# Patient Record
Sex: FEMALE | Race: BLACK OR AFRICAN AMERICAN | Employment: FULL TIME | URBAN - METROPOLITAN AREA
[De-identification: names, ages, dates, MRNs, and addresses within clinical notes are randomized per-mention and may not be internally consistent; named-entity substitution may affect disease eponyms.]

---

## 2017-01-17 ENCOUNTER — TELEPHONE (OUTPATIENT)
Dept: INTERNAL MEDICINE CLINIC | Age: 57
End: 2017-01-17

## 2017-01-17 NOTE — TELEPHONE ENCOUNTER
Pt requesting a call back with an appt for tomorrow for a poss stomach virus and BP being up/down.  Pt can be reached at [phone].        Message received & copied from [facility]

## 2017-01-18 NOTE — TELEPHONE ENCOUNTER
The patient missed a call from the office and is requesting a call back.  (l)[phone]       Message received & copied from [facility] after closing on 1/17/17

## 2017-01-18 NOTE — TELEPHONE ENCOUNTER
I called and spoke with patient. Pt states that she has had a stomach virus since Sunday. She states that she has had vomiting and diarrhea. She states that the vomiting has calmed down but is still having diarrhea. She was advised to keep herself hydrated with water and Gatorade mixed with water. She was also advised to take Imodium AD for diarrhea when she has diarrhea. She was advised to use BRAT diet. I went over not to eat something too heavy and can have clear soup as well. She verbalized understanding. She has an appointment with Dr Xavier Barnhart 1/20/17 for this issue and bp issues.

## 2017-01-20 ENCOUNTER — OFFICE VISIT (OUTPATIENT)
Dept: INTERNAL MEDICINE CLINIC | Age: 57
End: 2017-01-20

## 2017-01-20 VITALS
OXYGEN SATURATION: 99 % | SYSTOLIC BLOOD PRESSURE: 130 MMHG | WEIGHT: 170 LBS | HEART RATE: 72 BPM | TEMPERATURE: 97.9 F | BODY MASS INDEX: 32.1 KG/M2 | HEIGHT: 61 IN | DIASTOLIC BLOOD PRESSURE: 80 MMHG

## 2017-01-20 DIAGNOSIS — G44.221 CHRONIC TENSION-TYPE HEADACHE, INTRACTABLE: ICD-10-CM

## 2017-01-20 DIAGNOSIS — I10 ESSENTIAL HYPERTENSION: Primary | ICD-10-CM

## 2017-01-20 RX ORDER — ONDANSETRON 4 MG/1
4 TABLET, ORALLY DISINTEGRATING ORAL
Qty: 10 TAB | Refills: 0 | Status: SHIPPED | OUTPATIENT
Start: 2017-01-20 | End: 2017-12-28 | Stop reason: SDUPTHER

## 2017-01-20 RX ORDER — BUTALBITAL, ACETAMINOPHEN AND CAFFEINE 50; 325; 40 MG/1; MG/1; MG/1
TABLET ORAL
Qty: 15 TAB | Refills: 0 | Status: SHIPPED | OUTPATIENT
Start: 2017-01-20 | End: 2017-01-24 | Stop reason: SDUPTHER

## 2017-01-20 NOTE — MR AVS SNAPSHOT
Visit Information Date & Time Provider Department Dept. Phone Encounter #  
 1/20/2017 10:30 AM Omar Hussein, [address] [phone] [id number] Follow-up Instructions Return in about 2 months (around 3/20/2017) for dm-2 htn . Upcoming Health Maintenance Date Due Hepatitis C Screening 1960 EYE EXAM RETINAL OR DILATED Q1 5/30/1972 Pneumococcal 19-64 Medium Risk (1 of 1 - PPSV23) 5/30/1981 DTaP/Tdap/Td series (1 - Tdap) 5/30/1983 PAP AKA CERVICAL CYTOLOGY 5/30/1983 FOBT Q 1 YEAR AGE 50-75 5/30/2012 FOOT EXAM Q1 7/21/2016 INFLUENZA AGE 9 TO ADULT 8/1/2016 HEMOGLOBIN A1C Q6M 3/20/2017 BREAST CANCER SCRN MAMMOGRAM 8/19/2017 MICROALBUMIN Q1 9/20/2017 LIPID PANEL Q1 9/20/2017 Allergies as of 1/20/2017  Review Complete On: 1/20/2017 By: Brandon Khalil LPN No Known Allergies Current Immunizations  Never Reviewed No immunizations on file. Not reviewed this visit You Were Diagnosed With   
  
 Codes Comments Essential hypertension    -  Primary ICD-10-CM: I10 
ICD-9-CM: 401.9 Chronic tension-type headache, intractable     ICD-10-CM: I17.044 ICD-9-CM: 339.12 Vitals BP Pulse Temp Height(growth percentile) Weight(growth percentile) LMP  
 130/80 72 97.9 °F (36.6 °C) (Oral) 5' 1\" (1.549 m) 170 lb (77.1 kg) 11/29/2010 SpO2 BMI OB Status Smoking Status 99% 32.12 kg/m2 Postmenopausal Never Smoker Vitals History BMI and BSA Data Body Mass Index Body Surface Area  
 32.12 kg/m 2 1.82 m 2 Preferred Pharmacy Pharmacy Name Phone [id number] [place], [address] Jefe Yost [phone] Your Updated Medication List  
  
   
This list is accurate as of: 1/20/17 11:41 AM.  Always use your most recent med list.  
  
  
  
  
 aspirin delayed-release 81 mg tablet Take  by mouth daily. butalbital-acetaminophen-caffeine -40 mg per tablet Commonly known as:  FIORICET, ESGIC  
TAKE 1 TABLET EVERY 4 HOURS AS NEEDED  
  
 hydroCHLOROthiazide 12.5 mg capsule Commonly known as:  Lon Young Take 1 Cap by mouth daily. hydrocortisone 2.5 % topical cream  
Commonly known as:  HYTONE Apply  to affected area two (2) times a day. use thin layer  
  
 ibuprofen 600 mg tablet Commonly known as:  MOTRIN  
TAKE 1 TAB BY MOUTH EVERY EIGHT (8) HOURS AS NEEDED FOR PAIN. losartan 25 mg tablet Commonly known as:  COZAAR  
TAKE 1 TABLET BY MOUTH ONCE DAILY  
  
 metFORMIN 1,000 mg tablet Commonly known as:  GLUCOPHAGE  
TAKE 1 TABLET BY MOUTH ONCE DAILY  
  
 ondansetron 4 mg disintegrating tablet Commonly known as:  ZOFRAN ODT Take 1 Tab by mouth every eight (8) hours as needed for Nausea. simvastatin 40 mg tablet Commonly known as:  ZOCOR  
TAKE 1 TABLET BY MOUTH EVERY EVENING AT BEDTIME  
  
 timolol 0.5 % ophthalmic solution Commonly known as:  TIMOPTIC Prescriptions Printed Refills  
 butalbital-acetaminophen-caffeine (FIORICET, ESGIC) -40 mg per tablet 0 Sig: TAKE 1 TABLET EVERY 4 HOURS AS NEEDED Class: Print Prescriptions Sent to Pharmacy Refills  
 ondansetron (ZOFRAN ODT) 4 mg disintegrating tablet 0 Sig: Take 1 Tab by mouth every eight (8) hours as needed for Nausea. Class: Normal  
 Pharmacy: [facility]/pharmacy [address] [place] [address] #: [phone] Route: Oral  
  
Follow-up Instructions Return in about 2 months (around 3/20/2017) for dm-2 htn . Introducing [facility] & HEALTH SERVICES! Juana Haney introduces [employer] patient portal. Now you can access parts of your medical record, email your doctor's office, and request medication refills online. 1. In your internet browser, go to [email or web address] 2. Click on the First Time User? Click Here link in the Sign In box. You will see the New Member Sign Up page. 3. Enter your [employer] Access Code exactly as it appears below. You will not need to use this code after youve completed the sign-up process. If you do not sign up before the expiration date, you must request a new code. · [employer] Access Code: [email or web address] Expires: 4/20/2017 11:41 AM 
 
4. Enter the last four digits of your Social Security Number (xxxx) and Date of Birth (mm/dd/yyyy) as indicated and click Submit. You will be taken to the next sign-up page. 5. Create a [employer] ID. This will be your [employer] login ID and cannot be changed, so think of one that is secure and easy to remember. 6. Create a [employer] password. You can change your password at any time. 7. Enter your Password Reset Question and Answer. This can be used at a later time if you forget your password. 8. Enter your e-mail address. You will receive e-mail notification when new information is available in [address] 9. Click Sign Up. You can now view and download portions of your medical record. 10. Click the Download Summary menu link to download a portable copy of your medical information. If you have questions, please visit the Frequently Asked Questions section of the [employer] website. Remember, [employer] is NOT to be used for urgent needs. For medical emergencies, dial 911. Now available from your iPhone and Android! Please provide this summary of care documentation to your next provider. Your primary care clinician is listed as Main MARIE. If you have any questions after today's visit, please call [phone].

## 2017-01-20 NOTE — PROGRESS NOTES
HISTORY OF PRESENT ILLNESS  Hema Guthrie is a 49 y.o. female. HPI   F/u HTN  Got sick last week with GI virus, missed a few days of work this week Sunday, Monday, Wednesday. Feels better now  Nausea resolved, no longer vomiting. Still with some diarrhea. bp was 184/107 at work last week, had stopped hctz but restarted last week after BP was elevated. Has some headaches. Vision ok s/p laser sx for glaucoma--Dr Phillips. Patient Active Problem List    Diagnosis Date Noted    Peripheral neuropathy ([facility] 01/30/2012    Bronchitis 06/19/2009    Diabetes mellitus ([facility] 06/19/2009    Diabetic gastroparesis ([facility] 06/19/2009    HTN (hypertension) 06/19/2009    Hypercholesterolemia 06/19/2009    Osteoarthritis 06/19/2009     Current Outpatient Prescriptions   Medication Sig Dispense Refill    ondansetron (ZOFRAN ODT) 4 mg disintegrating tablet Take 1 Tab by mouth every eight (8) hours as needed for Nausea. 10 Tab 0    butalbital-acetaminophen-caffeine (FIORICET, ESGIC) -40 mg per tablet TAKE 1 TABLET EVERY 4 HOURS AS NEEDED 15 Tab 0    losartan (COZAAR) 25 mg tablet TAKE 1 TABLET BY MOUTH ONCE DAILY 90 Tab 2    aspirin delayed-release 81 mg tablet Take  by mouth daily.  simvastatin (ZOCOR) 40 mg tablet TAKE 1 TABLET BY MOUTH EVERY EVENING AT BEDTIME 90 Tab 2    metFORMIN (GLUCOPHAGE) 1,000 mg tablet TAKE 1 TABLET BY MOUTH ONCE DAILY 90 Tab 3    hydrocortisone (HYTONE) 2.5 % topical cream Apply  to affected area two (2) times a day. use thin layer 30 g 0    hydrochlorothiazide (MICROZIDE) 12.5 mg capsule Take 1 Cap by mouth daily. 30 Cap 6    timolol (TIMOPTIC) 0.5 % ophthalmic solution   4    ibuprofen (MOTRIN) 600 mg tablet TAKE 1 TAB BY MOUTH EVERY EIGHT (8) HOURS AS NEEDED FOR PAIN.  30 Tab 1     No Known Allergies   Lab Results  Component Value Date/Time   Hemoglobin A1c 6.4 09/20/2016 11:36 AM   Hemoglobin A1c 6.3 09/30/2015 11:43 AM   Hemoglobin A1c 6.5 05/04/2015 12:00 AM   Glucose 98 09/20/2016 11:36 AM   Glucose  12/22/2015 10:15 AM   Microalb/Creat ratio (ug/mg creat.) 8.2 09/20/2016 11:36 AM   LDL, calculated 120 09/20/2016 11:36 AM   Creatinine 0.81 09/20/2016 11:36 AM      Lab Results  Component Value Date/Time   Cholesterol, total 201 09/20/2016 11:36 AM   HDL Cholesterol 62 09/20/2016 11:36 AM   LDL, calculated 120 09/20/2016 11:36 AM   Triglyceride 93 09/20/2016 11:36 AM   CHOL/HDL Ratio 3.2 07/07/2010 12:14 PM       Lab Results  Component Value Date/Time   GFR est AA 95 09/20/2016 11:36 AM   GFR est non-AA 83 09/20/2016 11:36 AM   Creatinine 0.81 09/20/2016 11:36 AM   BUN 25 09/20/2016 11:36 AM   Sodium 142 09/20/2016 11:36 AM   Potassium 4.8 09/20/2016 11:36 AM   Chloride 99 09/20/2016 11:36 AM   CO2 22 09/20/2016 11:36 AM         ROS    Physical Exam   Constitutional: She appears well-developed and well-nourished. Appears stated age   Cardiovascular: Normal rate, regular rhythm and normal heart sounds. Exam reveals no gallop and no friction rub. No murmur heard. Pulmonary/Chest: Effort normal and breath sounds normal. No respiratory distress. She has no wheezes. Abdominal: Soft. Bowel sounds are normal.   Musculoskeletal: She exhibits no edema. Neurological: She is alert. Psychiatric: She has a normal mood and affect. Nursing note and vitals reviewed. ASSESSMENT and PLAN  Alo Prince was seen today for hypertension, cough and headache. Diagnoses and all orders for this visit:    Essential hypertension   Controlled, stressed med adherence    Chronic tension-type headache, intractable   Refill fioricet 15 tabs      Gastroenteritis   sxs resolving   immodium ad prn   rx for zofran   Work note  Other orders  -     ondansetron (ZOFRAN ODT) 4 mg disintegrating tablet; Take 1 Tab by mouth every eight (8) hours as needed for Nausea.   -     butalbital-acetaminophen-caffeine (FIORICET, ESGIC) -40 mg per tablet; TAKE 1 TABLET EVERY 4 HOURS AS NEEDED      Follow-up Disposition:  Return in about 2 months (around 3/20/2017) for dm-2 htn .

## 2017-01-20 NOTE — LETTER
NOTIFICATION RETURN TO WORK / SCHOOL 
 
1/20/2017 11:44 AM 
 
Ms. Tariq Vasquez [address] [place] [address] [phone] To Whom It May Concern: 
 
Tariq Vasquez is currently under the care of [facility]. She will return to work/school on: 1/23/17 Please excuse work absences 1/15/17, 1/16/17, 1/18/17 and 1/20/17 due to medical illness If there are questions or concerns please have the patient contact our office.  
 
 
 
Sincerely, 
 
 
Kelly Naqvi MD

## 2017-01-24 RX ORDER — BUTALBITAL, ACETAMINOPHEN AND CAFFEINE 50; 325; 40 MG/1; MG/1; MG/1
TABLET ORAL
Qty: 10 TAB | Refills: 0 | Status: SHIPPED | OUTPATIENT
Start: 2017-01-24 | End: 2017-03-16 | Stop reason: SDUPTHER

## 2017-03-16 ENCOUNTER — OFFICE VISIT (OUTPATIENT)
Dept: INTERNAL MEDICINE CLINIC | Age: 57
End: 2017-03-16

## 2017-03-16 VITALS
OXYGEN SATURATION: 100 % | SYSTOLIC BLOOD PRESSURE: 162 MMHG | BODY MASS INDEX: 32.47 KG/M2 | DIASTOLIC BLOOD PRESSURE: 64 MMHG | TEMPERATURE: 97.8 F | HEART RATE: 66 BPM | WEIGHT: 172 LBS | HEIGHT: 61 IN

## 2017-03-16 DIAGNOSIS — G44.219 EPISODIC TENSION-TYPE HEADACHE, NOT INTRACTABLE: ICD-10-CM

## 2017-03-16 DIAGNOSIS — E11.620 TYPE 2 DIABETES MELLITUS WITH DIABETIC DERMATITIS, WITHOUT LONG-TERM CURRENT USE OF INSULIN (HCC): ICD-10-CM

## 2017-03-16 DIAGNOSIS — I10 ESSENTIAL HYPERTENSION: Primary | ICD-10-CM

## 2017-03-16 DIAGNOSIS — H57.12 EYE PAIN, LEFT: ICD-10-CM

## 2017-03-16 RX ORDER — HYDROCHLOROTHIAZIDE 25 MG/1
25 TABLET ORAL DAILY
Qty: 90 TAB | Refills: 3 | Status: SHIPPED | OUTPATIENT
Start: 2017-03-16 | End: 2017-12-28 | Stop reason: SDUPTHER

## 2017-03-16 RX ORDER — LOSARTAN POTASSIUM 50 MG/1
TABLET ORAL
Qty: 90 TAB | Refills: 3 | Status: SHIPPED | OUTPATIENT
Start: 2017-03-16 | End: 2017-12-28 | Stop reason: SDUPTHER

## 2017-03-16 RX ORDER — BUTALBITAL, ACETAMINOPHEN AND CAFFEINE 50; 325; 40 MG/1; MG/1; MG/1
TABLET ORAL
Qty: 20 TAB | Refills: 0 | Status: SHIPPED | OUTPATIENT
Start: 2017-03-16 | End: 2017-05-02 | Stop reason: SDUPTHER

## 2017-03-16 NOTE — MR AVS SNAPSHOT
Visit Information Date & Time Provider Department Dept. Phone Encounter #  
 3/16/2017  9:45 AM Mina Boo, [address] [place] [address],[address] [phone] [id number] Follow-up Instructions Return in about 1 month (around 4/16/2017) for HTN. Your Appointments 3/20/2017  3:00 PM  
ESTABLISHED PATIENT with Huber Matias MD  
[facility] Dr. Juan J Garcia [facility] (Jelani Guzmán) Appt Note: re evaluate cyst  
 [address], [address] [facility], [address] [phone]  
[address], [address], [address] [facility] [address] [address]  
  
    
 9/21/2017 10:30 AM  
PHYSICAL with Mina Boo MD  
[facility] Jelani Guzmán) Appt Note: CPE//Yearly Physical w/fasting labs//last cpe 9/20/16//Bam-3/14/17  
 [facility] [address] [id number]  
[address] [facility] [address] [place] [address] Upcoming Health Maintenance Date Due Hepatitis C Screening 1960 EYE EXAM RETINAL OR DILATED Q1 5/30/1972 Pneumococcal 19-64 Medium Risk (1 of 1 - PPSV23) 5/30/1981 DTaP/Tdap/Td series (1 - Tdap) 5/30/1983 PAP AKA CERVICAL CYTOLOGY 5/30/1983 FOBT Q 1 YEAR AGE 50-75 5/30/2012 FOOT EXAM Q1 7/21/2016 INFLUENZA AGE 9 TO ADULT 8/1/2016 HEMOGLOBIN A1C Q6M 3/20/2017 BREAST CANCER SCRN MAMMOGRAM 8/19/2017 MICROALBUMIN Q1 9/20/2017 LIPID PANEL Q1 9/20/2017 Allergies as of 3/16/2017  Review Complete On: 3/16/2017 By: Erika Currie LPN No Known Allergies Current Immunizations  Never Reviewed No immunizations on file. Not reviewed this visit You Were Diagnosed With   
  
 Codes Comments Essential hypertension    -  Primary ICD-10-CM: I10 
ICD-9-CM: 401.9 Episodic tension-type headache, not intractable     ICD-10-CM: Q39.382 ICD-9-CM: 339.11  Type 2 diabetes mellitus with diabetic dermatitis, without long-term current use of insulin ([facility]     ICD-10-CM: E11.620 ICD-9-CM: 250.80 Eye pain, left     ICD-10-CM: H57.12 
ICD-9-CM: 379.91 Vitals BP Pulse Temp Height(growth percentile) Weight(growth percentile) LMP  
 162/64 66 97.8 °F (36.6 °C) (Oral) 5' 1\" (1.549 m) 172 lb (78 kg) 11/29/2010 SpO2 BMI OB Status Smoking Status 100% 32.5 kg/m2 Postmenopausal Never Smoker Vitals History BMI and BSA Data Body Mass Index Body Surface Area 32.5 kg/m 2 1.83 m 2 Preferred Pharmacy Pharmacy Name Phone [phone], [address] Anna Kapadia [phone] Your Updated Medication List  
  
   
This list is accurate as of: 3/16/17 10:38 AM.  Always use your most recent med list.  
  
  
  
  
 aspirin delayed-release 81 mg tablet Take  by mouth daily. butalbital-acetaminophen-caffeine -40 mg per tablet Commonly known as:  FIORICET, ESGIC  
TAKE 1 TABLET EVERY 4 HOURS AS NEEDED  
  
 hydroCHLOROthiazide 25 mg tablet Commonly known as:  HYDRODIURIL Take 1 Tab by mouth daily. hydrocortisone 2.5 % topical cream  
Commonly known as:  HYTONE Apply  to affected area two (2) times a day. use thin layer  
  
 ibuprofen 600 mg tablet Commonly known as:  MOTRIN  
TAKE 1 TAB BY MOUTH EVERY EIGHT (8) HOURS AS NEEDED FOR PAIN. losartan 50 mg tablet Commonly known as:  COZAAR  
TAKE 1 TABLET BY MOUTH ONCE DAILY  
  
 metFORMIN 1,000 mg tablet Commonly known as:  GLUCOPHAGE  
TAKE 1 TABLET BY MOUTH ONCE DAILY  
  
 ondansetron 4 mg disintegrating tablet Commonly known as:  ZOFRAN ODT Take 1 Tab by mouth every eight (8) hours as needed for Nausea. simvastatin 40 mg tablet Commonly known as:  ZOCOR  
TAKE 1 TABLET BY MOUTH EVERY EVENING AT BEDTIME  
  
 timolol 0.5 % ophthalmic solution Commonly known as:  TIMOPTIC Prescriptions Printed Refills butalbital-acetaminophen-caffeine (FIORICET, ESGIC) -40 mg per tablet 0 Sig: TAKE 1 TABLET EVERY 4 HOURS AS NEEDED Class: Print Prescriptions Sent to Pharmacy Refills  
 losartan (COZAAR) 50 mg tablet 3 Sig: TAKE 1 TABLET BY MOUTH ONCE DAILY Class: Normal  
 Pharmacy: [address] Ph #: [phone]  
 hydroCHLOROthiazide (HYDRODIURIL) 25 mg tablet 3 Sig: Take 1 Tab by mouth daily. Class: Normal  
 Pharmacy: [address] Ph #: [phone] Route: Oral  
  
We Performed the Following CBC W/O DIFF [25383 CPT(R)] HEMOGLOBIN A1C WITH EAG [41842 CPT(R)] METABOLIC PANEL, BASIC [79716 CPT(R)] REFERRAL TO OPHTHALMOLOGY [REF57 Custom] Comments:  
 Please evaluate patient for eye pain Follow-up Instructions Return in about 1 month (around 4/16/2017) for HTN. Referral Information Referral ID Referred By Referred To  
  
 [id number] Karina MARIE [facility] 66 91 [address] [place], [address] [facility] Visits Status Start Date End Date 1 New Request 3/16/17 3/16/18 If your referral has a status of pending review or denied, additional information will be sent to support the outcome of this decision. Introducing [facility] & HEALTH SERVICES! Sally uTrpin introduces [employer] patient portal. Now you can access parts of your medical record, email your doctor's office, and request medication refills online. 1. In your internet browser, go to [email or web address] 2. Click on the First Time User? Click Here link in the Sign In box. You will see the New Member Sign Up page. 3. Enter your [employer] Access Code exactly as it appears below. You will not need to use this code after youve completed the sign-up process. If you do not sign up before the expiration date, you must request a new code. · [employer] Access Code: [email or web address] Expires: 4/20/2017 12:41 PM 
 
4. Enter the last four digits of your Social Security Number (xxxx) and Date of Birth (mm/dd/yyyy) as indicated and click Submit. You will be taken to the next sign-up page. 5. Create a [employer] ID. This will be your [employer] login ID and cannot be changed, so think of one that is secure and easy to remember. 6. Create a [employer] password. You can change your password at any time. 7. Enter your Password Reset Question and Answer. This can be used at a later time if you forget your password. 8. Enter your e-mail address. You will receive e-mail notification when new information is available in [address] 9. Click Sign Up. You can now view and download portions of your medical record. 10. Click the Download Summary menu link to download a portable copy of your medical information. If you have questions, please visit the Frequently Asked Questions section of the [employer] website. Remember, [employer] is NOT to be used for urgent needs. For medical emergencies, dial 911. Now available from your iPhone and Android! Please provide this summary of care documentation to your next provider. Your primary care clinician is listed as Guru MARIE. If you have any questions after today's visit, please call [phone].

## 2017-03-16 NOTE — PROGRESS NOTES
HISTORY OF PRESENT ILLNESS  Darlyn Case is a 49 y.o. female. HPI   C/o elevated BP and headaches last few weeks  sbp up to 180 at work a few weeks ago  Sharp CORONADO pain hours, every other day to every day, pain sound nose and sinus area  Denies sinus congestion  Tylenol, advil not helping  Pt concerned pain is r/t glaucoma left eye pain  Has glaucoma s/p laser sx-Dr Pihllips  Concerned her diabetes may be out of control    Patient Active Problem List    Diagnosis Date Noted    Peripheral neuropathy ([facility] 01/30/2012    Bronchitis 06/19/2009    [facility] 06/19/2009    Diabetic gastroparesis ([facility] 06/19/2009    HTN (hypertension) 06/19/2009    Hypercholesterolemia 06/19/2009    Osteoarthritis 06/19/2009     Current Outpatient Prescriptions   Medication Sig Dispense Refill    losartan (COZAAR) 50 mg tablet TAKE 1 TABLET BY MOUTH ONCE DAILY 90 Tab 3    hydroCHLOROthiazide (HYDRODIURIL) 25 mg tablet Take 1 Tab by mouth daily. 90 Tab 3    butalbital-acetaminophen-caffeine (FIORICET, ESGIC) -40 mg per tablet TAKE 1 TABLET EVERY 4 HOURS AS NEEDED 20 Tab 0    aspirin delayed-release 81 mg tablet Take  by mouth daily.  ibuprofen (MOTRIN) 600 mg tablet TAKE 1 TAB BY MOUTH EVERY EIGHT (8) HOURS AS NEEDED FOR PAIN. 30 Tab 1    simvastatin (ZOCOR) 40 mg tablet TAKE 1 TABLET BY MOUTH EVERY EVENING AT BEDTIME 90 Tab 2    metFORMIN (GLUCOPHAGE) 1,000 mg tablet TAKE 1 TABLET BY MOUTH ONCE DAILY 90 Tab 3    hydrocortisone (HYTONE) 2.5 % topical cream Apply  to affected area two (2) times a day. use thin layer 30 g 0    timolol (TIMOPTIC) 0.5 % ophthalmic solution   4    ondansetron (ZOFRAN ODT) 4 mg disintegrating tablet Take 1 Tab by mouth every eight (8) hours as needed for Nausea.  10 Tab 0     No Known Allergies   Lab Results  Component Value Date/Time   Hemoglobin A1c 6.4 09/20/2016 11:36 AM   Hemoglobin A1c 6.3 09/30/2015 11:43 AM   Hemoglobin A1c 6.5 05/04/2015 12:00 AM   Glucose 98 09/20/2016 11:36 AM   Glucose  12/22/2015 10:15 AM   Microalb/Creat ratio (ug/mg creat.) 8.2 09/20/2016 11:36 AM   LDL, calculated 120 09/20/2016 11:36 AM   Creatinine 0.81 09/20/2016 11:36 AM      Lab Results  Component Value Date/Time   Cholesterol, total 201 09/20/2016 11:36 AM   HDL Cholesterol 62 09/20/2016 11:36 AM   LDL, calculated 120 09/20/2016 11:36 AM   Triglyceride 93 09/20/2016 11:36 AM   CHOL/HDL Ratio 3.2 07/07/2010 12:14 PM       Lab Results  Component Value Date/Time   GFR est AA 95 09/20/2016 11:36 AM   GFR est non-AA 83 09/20/2016 11:36 AM   Creatinine 0.81 09/20/2016 11:36 AM   BUN 25 09/20/2016 11:36 AM   Sodium 142 09/20/2016 11:36 AM   Potassium 4.8 09/20/2016 11:36 AM   Chloride 99 09/20/2016 11:36 AM   CO2 22 09/20/2016 11:36 AM         ROS    Physical Exam   Constitutional: She appears well-developed and well-nourished. Appears stated age   Cardiovascular: Normal rate, regular rhythm and normal heart sounds. Exam reveals no gallop and no friction rub. No murmur heard. Pulmonary/Chest: Effort normal and breath sounds normal. No respiratory distress. She has no wheezes. Abdominal: Soft. Bowel sounds are normal.   Musculoskeletal: She exhibits no edema. Neurological: She is alert. Psychiatric: She has a normal mood and affect. Nursing note and vitals reviewed. ASSESSMENT and PLAN  Kenna Biswas was seen today for headache.     Diagnoses and all orders for this visit:    Essential hypertension   152/86   Increase losartan 50 mg every day   Increase hctz 25 mg qd  Episodic tension-type headache, not intractable   fioricet refilled    Type 2 diabetes mellitus , without long-term current use of insulin ([facility])  -     HEMOGLOBIN A1C WITH EAG  -     CBC W/O DIFF  -     METABOLIC PANEL, BASIC    Eye pain, left  -     REFERRAL TO OPHTHALMOLOGY    Other orders  -     losartan (COZAAR) 50 mg tablet; TAKE 1 TABLET BY MOUTH ONCE DAILY  -     hydroCHLOROthiazide (HYDRODIURIL) 25 mg tablet; Take 1 Tab by mouth daily. -     butalbital-acetaminophen-caffeine (FIORICET, ESGIC) -40 mg per tablet; TAKE 1 TABLET EVERY 4 HOURS AS NEEDED      Follow-up Disposition:  Return in about 1 month (around 4/16/2017) for HTN.

## 2017-03-16 NOTE — LETTER
NOTIFICATION RETURN TO WORK / SCHOOL 
 
3/16/2017 10:33 AM 
 
Ms. John Waters [address] [phone] To Whom It May Concern: 
 
John Waters is currently under the care of [facility]. She will return to work/school on: 3-18-17 If there are questions or concerns please have the patient contact our office.  
 
 
 
Sincerely, 
 
 
Fabián Fishman MD

## 2017-03-17 ENCOUNTER — TELEPHONE (OUTPATIENT)
Dept: INTERNAL MEDICINE CLINIC | Age: 57
End: 2017-03-17

## 2017-03-17 LAB
BUN SERPL-MCNC: 16 MG/DL (ref 6–24)
BUN/CREAT SERPL: 20 (ref 9–23)
CALCIUM SERPL-MCNC: 9.6 MG/DL (ref 8.7–10.2)
CHLORIDE SERPL-SCNC: 105 MMOL/L (ref 96–106)
CO2 SERPL-SCNC: 26 MMOL/L (ref 18–29)
CREAT SERPL-MCNC: 0.79 MG/DL (ref 0.57–1)
ERYTHROCYTE [DISTWIDTH] IN BLOOD BY AUTOMATED COUNT: 14.7 % (ref 12.3–15.4)
EST. AVERAGE GLUCOSE BLD GHB EST-MCNC: 143 MG/DL
GLUCOSE SERPL-MCNC: 113 MG/DL (ref 65–99)
HBA1C MFR BLD: 6.6 % (ref 4.8–5.6)
HCT VFR BLD AUTO: 39 % (ref 34–46.6)
HGB BLD-MCNC: 12.8 G/DL (ref 11.1–15.9)
MCH RBC QN AUTO: 29 PG (ref 26.6–33)
MCHC RBC AUTO-ENTMCNC: 32.8 G/DL (ref 31.5–35.7)
MCV RBC AUTO: 88 FL (ref 79–97)
PLATELET # BLD AUTO: 238 X10E3/UL (ref 150–379)
POTASSIUM SERPL-SCNC: 4.1 MMOL/L (ref 3.5–5.2)
RBC # BLD AUTO: 4.42 X10E6/UL (ref 3.77–5.28)
SODIUM SERPL-SCNC: 146 MMOL/L (ref 134–144)
WBC # BLD AUTO: 9.7 X10E3/UL (ref 3.4–10.8)

## 2017-03-17 NOTE — TELEPHONE ENCOUNTER
I called and spoke with patient. She was informed that we do not do cortisone injections. She states that she thought she called Dr Tamar Briggs office. She was given number for [facility].

## 2017-03-17 NOTE — TELEPHONE ENCOUNTER
Pt wants to come in today for a Cortizone injection. She said she can't wait until the 28 th (I don't see an appt for 3-28-17?)  Pt states she is in a lot of pain. Please call as soon as possible.

## 2017-03-20 ENCOUNTER — OFFICE VISIT (OUTPATIENT)
Dept: SURGERY | Age: 57
End: 2017-03-20

## 2017-03-20 VITALS
BODY MASS INDEX: 31.72 KG/M2 | DIASTOLIC BLOOD PRESSURE: 88 MMHG | RESPIRATION RATE: 20 BRPM | SYSTOLIC BLOOD PRESSURE: 130 MMHG | HEART RATE: 77 BPM | HEIGHT: 61 IN | WEIGHT: 168 LBS | OXYGEN SATURATION: 99 %

## 2017-03-20 DIAGNOSIS — L72.3 SEBACEOUS CYST: Primary | ICD-10-CM

## 2017-03-20 NOTE — PROGRESS NOTES
Patient is here for follow-up. Patient was seen in the clinic on 10/19/15 for right breast sebaceous cyst.  Possible surgical excision discussed with the patient at that time, but patient decided to hold off. Patient returns today and wants to proceed with excision. Patient reports that her cyst is getting bigger and complains of foul smelling drainage. Currently denies any pain. PE:  Visit Vitals    /88 (BP 1 Location: Right arm, BP Patient Position: Sitting)    Pulse 77    Resp 20    Ht 5' 1\" (1.549 m)    Wt 76.2 kg (168 lb)    LMP 11/29/2010    SpO2 99%    BMI 31.74 kg/m2     Alert. NAD. Breast:  Right breast with 2 x 1.5 cm mobile, non-tender sebaceous cyst at 2 o'clock. No erythema or drainage. Assessment:  Right breast sebaceous cyst    Plan:  -Excision of right breast sebaceous cyst.  -Risks, benefit, and alternative to surgery was discussed with the patient. The risks of surgery include but not limited to infection, bleeding, and recurrence. Option of having it done in the clinic vs OR discussed and patient would like to have it done in the clinic. Patient wants to have it done in the clinic. All questions answered.

## 2017-03-22 ENCOUNTER — TELEPHONE (OUTPATIENT)
Dept: INTERNAL MEDICINE CLINIC | Age: 57
End: 2017-03-22

## 2017-03-22 NOTE — TELEPHONE ENCOUNTER
Patient states she needs a call back in reference to her Blood Pressure & medications prescribed. Patient needs to discuss should she continue on both. Please call to advise.  Thank you

## 2017-03-23 NOTE — TELEPHONE ENCOUNTER
I called and spoke with patient. She was informed that she should be on two medications for blood pressure. She was advised to check her bp a few times each week and bring the results with her. She verbalized understanding.

## 2017-03-29 RX ORDER — LANCETS
EACH MISCELLANEOUS
Qty: 100 EACH | Refills: 11 | Status: SHIPPED | OUTPATIENT
Start: 2017-03-29 | End: 2017-12-28 | Stop reason: SDUPTHER

## 2017-03-29 NOTE — TELEPHONE ENCOUNTER
Patient needs the test strips and needles to One Touch Ultra 2 to the [facility] pharmacy on [address]. The number is [phone].          Message received & copied from [facility]

## 2017-04-07 ENCOUNTER — HOSPITAL ENCOUNTER (OUTPATIENT)
Dept: LAB | Age: 57
Discharge: HOME | End: 2017-04-07

## 2017-04-07 ENCOUNTER — OFFICE VISIT (OUTPATIENT)
Dept: SURGERY | Age: 57
End: 2017-04-07

## 2017-04-07 VITALS
HEART RATE: 67 BPM | BODY MASS INDEX: 30.89 KG/M2 | TEMPERATURE: 98.1 F | SYSTOLIC BLOOD PRESSURE: 121 MMHG | DIASTOLIC BLOOD PRESSURE: 87 MMHG | HEIGHT: 61 IN | RESPIRATION RATE: 20 BRPM | WEIGHT: 163.6 LBS | OXYGEN SATURATION: 100 %

## 2017-04-07 DIAGNOSIS — L72.0 EPIDERMOID CYST OF SKIN OF CHEST: Primary | ICD-10-CM

## 2017-04-07 RX ORDER — HYDROCODONE BITARTRATE AND ACETAMINOPHEN 5; 325 MG/1; MG/1
1-2 TABLET ORAL
Qty: 30 TAB | Refills: 0 | Status: SHIPPED | OUTPATIENT
Start: 2017-04-07 | End: 2017-11-20 | Stop reason: ALTCHOICE

## 2017-04-07 NOTE — PROGRESS NOTES
Procedure Note:  Excision of right breast sebaceous cyst    After informed consent and time out was performed, patient's right breast was prepped and draped in standard surgical fashion. After local anesthetics, a 2 cm elliptical incision was made over the 2 x 1.5 cm cyst at 2 o'clock and the cyst was excised using combination of electrocautery and sharp dissection. There was no spillage of the cyst content. Specimen was sent off to the pathology. Good hemostasis was obtained. Incision was then closed in two layers. Subcutaneous layer was approximated using interrupted 3-0 Vicryl and skin was closed with 4-0 Vicryl subcuticular stitch. Steri strips were then applied followed by dry dressing.

## 2017-04-07 NOTE — PROGRESS NOTES
[facility] Psych[facility] - [address]  OFFICE PROCEDURE PROGRESS NOTE        Chart reviewed for the following:   Chris Woo, have reviewed the History, Physical and updated the Allergic reactions for [address] performed immediately prior to start of procedure:   Chris Woo, have performed the following reviews on Julianna Villafuerte prior to the start of the procedure:            * Patient was identified by name and date of birth   * Agreement on procedure being performed was verified  * Risks and Benefits explained to the patient  * Procedure site verified and marked as necessary  * Patient was positioned for comfort  * Consent was signed and verified     Time: 4:00 PM      Date of procedure: 4/7/2017    Procedure performed by:  Austin Lopez MD    Provider assisted by:  MA    Patient assisted by: self    How tolerated by patient: tolerated the procedure well with no complications    Post Procedural Pain Scale: 2 - Hurts Little Bit    Comments: none

## 2017-04-07 NOTE — LETTER
NOTIFICATION RETURN TO WORK  
 
 
 
 
 
 
 
4/7/2017 4:50 PM 
 
Ms. Maren Nieto [address] [phone] To Whom It May Concern: 
 
Maren Nieto is currently under the care of [address] [facility]. She will return to work on: 4/15/2017 , full duty and normal activities. If there are questions or concerns please have the patient contact our office. Sincerely, Kenya Cormier MD

## 2017-04-24 ENCOUNTER — TELEPHONE (OUTPATIENT)
Dept: INTERNAL MEDICINE CLINIC | Age: 57
End: 2017-04-24

## 2017-04-24 NOTE — TELEPHONE ENCOUNTER
Pt states that she is needing a call back in regards to her bp fluctuating being high and low and having fluid around ankles. Please call pt. Pt states that she has been monitoring her diet and blood sugar.

## 2017-04-24 NOTE — TELEPHONE ENCOUNTER
Pt states right now 11 AM her b/p is 119/66 she would still like to speak with the nurse and also about the fluid around ankles       Leave detailed vm if needed.

## 2017-04-24 NOTE — TELEPHONE ENCOUNTER
Patient reports BP readings have been ranging from 130s/70-80s. Reports she started noticing lower leg edema bilaterally. She is on her feet all day & wearing compression stockings. Not elevating feet at rest & not hydrating enough. Denies pain, weeping, SOB or new symptoms. BS are in good range. Recommended wearing hose, elevating feet at rest, increasing water intake, & observing for new symptoms.  Patient's appointment has been rescheduled for Tuesday, May 02, 2017 03:45 PM.

## 2017-05-02 ENCOUNTER — OFFICE VISIT (OUTPATIENT)
Dept: INTERNAL MEDICINE CLINIC | Age: 57
End: 2017-05-02

## 2017-05-02 VITALS
BODY MASS INDEX: 30.66 KG/M2 | TEMPERATURE: 98 F | HEART RATE: 69 BPM | WEIGHT: 162.4 LBS | OXYGEN SATURATION: 98 % | DIASTOLIC BLOOD PRESSURE: 82 MMHG | HEIGHT: 61 IN | SYSTOLIC BLOOD PRESSURE: 124 MMHG

## 2017-05-02 DIAGNOSIS — I10 ESSENTIAL HYPERTENSION: Primary | ICD-10-CM

## 2017-05-02 RX ORDER — BUTALBITAL, ACETAMINOPHEN AND CAFFEINE 50; 325; 40 MG/1; MG/1; MG/1
TABLET ORAL
Qty: 20 TAB | Refills: 0 | Status: SHIPPED | OUTPATIENT
Start: 2017-05-02 | End: 2017-05-25 | Stop reason: SDUPTHER

## 2017-05-02 RX ORDER — MECLIZINE HCL 12.5 MG 12.5 MG/1
12.5 TABLET ORAL
Qty: 30 TAB | Refills: 0 | Status: SHIPPED | OUTPATIENT
Start: 2017-05-02 | End: 2017-05-12

## 2017-05-02 NOTE — PROGRESS NOTES
HISTORY OF PRESENT ILLNESS  Mya Melchor is a 49 y.o. female. HPI   F/u HTN and leg edema  Taking higher dose of cozaar and hctz since last visit  Leg edema is new since last visit  Weight is down slightly    Patient Active Problem List    Diagnosis Date Noted    Peripheral neuropathy ([facility] [address] [address] 01/30/2012    Bronchitis 06/19/2009    Diabetes mellitus ([facility] [address] 06/19/2009    Diabetic gastroparesis ([facility] 06/19/2009    HTN (hypertension) 06/19/2009    Hypercholesterolemia 06/19/2009    Osteoarthritis 06/19/2009     Current Outpatient Prescriptions   Medication Sig Dispense Refill    glucose blood VI test strips (ASCENSIA AUTODISC VI, ONE TOUCH ULTRA TEST VI) strip One Touch Ultra 2 meter Check blood sugars daily. ICD-10 E11.620 50 Strip 11    Lancets misc One Touch Ultra 2 meter. Check blood sugars daily. ICD-10 E11.620 100 Each 11    losartan (COZAAR) 50 mg tablet TAKE 1 TABLET BY MOUTH ONCE DAILY 90 Tab 3    hydroCHLOROthiazide (HYDRODIURIL) 25 mg tablet Take 1 Tab by mouth daily. 90 Tab 3    butalbital-acetaminophen-caffeine (FIORICET, ESGIC) -40 mg per tablet TAKE 1 TABLET EVERY 4 HOURS AS NEEDED 20 Tab 0    aspirin delayed-release 81 mg tablet Take  by mouth daily.  ibuprofen (MOTRIN) 600 mg tablet TAKE 1 TAB BY MOUTH EVERY EIGHT (8) HOURS AS NEEDED FOR PAIN. 30 Tab 1    simvastatin (ZOCOR) 40 mg tablet TAKE 1 TABLET BY MOUTH EVERY EVENING AT BEDTIME 90 Tab 2    metFORMIN (GLUCOPHAGE) 1,000 mg tablet TAKE 1 TABLET BY MOUTH ONCE DAILY 90 Tab 3    hydrocortisone (HYTONE) 2.5 % topical cream Apply  to affected area two (2) times a day. use thin layer 30 g 0    timolol (TIMOPTIC) 0.5 % ophthalmic solution   4    HYDROcodone-acetaminophen (NORCO) 5-325 mg per tablet Take 1-2 Tabs by mouth every four (4) hours as needed for Pain. Max Daily Amount: 12 Tabs. 30 Tab 0    ondansetron (ZOFRAN ODT) 4 mg disintegrating tablet Take 1 Tab by mouth every eight (8) hours as needed for Nausea.  10 Tab 0 No Known Allergies        ROS    Physical Exam   Constitutional: She appears well-developed and well-nourished. Appears stated age   Cardiovascular: Normal rate, regular rhythm and normal heart sounds. Exam reveals no gallop and no friction rub. No murmur heard. Pulmonary/Chest: Effort normal and breath sounds normal. No respiratory distress. She has no wheezes. Abdominal: Soft. Bowel sounds are normal.   Musculoskeletal: She exhibits no edema. Minimal ankle edema b/l   Neurological: She is alert. Psychiatric: She has a normal mood and affect. Nursing note and vitals reviewed. ASSESSMENT and PLAN  Spike Hartman was seen today for hypertension, leg swelling, dizziness and ear fullness. Diagnoses and all orders for this visit:    Essential hypertension   Controlled, continue medicines    Ankle edema   Leg elevation, support stockings advised    Tension HA   Refilled fioricet 20 tabs/nr     Dizziness   Refill meclizine   Other orders  -     butalbital-acetaminophen-caffeine (FIORICET, ESGIC) -40 mg per tablet; TAKE 1 TABLET EVERY 4 HOURS AS NEEDED  -     meclizine (ANTIVERT) 12.5 mg tablet; Take 1 Tab by mouth three (3) times daily as needed for up to 10 days. rtc 4 mos dm-2 htn hld  Follow-up Disposition:  Return in about 4 months (around 9/2/2017) for dm-2 htn hld.

## 2017-05-02 NOTE — MR AVS SNAPSHOT
Visit Information Date & Time Provider Department Dept. Phone Encounter #  
 5/2/2017  3:45 PM Quentin Bernstein, [address] [place] [address],[address] [phone] [id number] Follow-up Instructions Return in about 4 months (around 9/2/2017) for dm-2 htn hld. Your Appointments 9/21/2017 10:30 AM  
PHYSICAL with Quentin Bernstein MD  
[facility] [address]) Appt Note: CPE//Yearly Physical w/fasting labs//last cpe 9/20/16//Bam-3/14/17  
 [facility] [address] [id number]  
[address] [facility] [address] [place] [address] Upcoming Health Maintenance Date Due Hepatitis C Screening 1960 EYE EXAM RETINAL OR DILATED Q1 5/30/1972 Pneumococcal 19-64 Medium Risk (1 of 1 - PPSV23) 5/30/1981 DTaP/Tdap/Td series (1 - Tdap) 5/30/1983 PAP AKA CERVICAL CYTOLOGY 5/30/1983 FOBT Q 1 YEAR AGE 50-75 5/30/2012 FOOT EXAM Q1 7/21/2016 BREAST CANCER SCRN MAMMOGRAM 8/19/2017 INFLUENZA AGE 9 TO ADULT 8/1/2017 HEMOGLOBIN A1C Q6M 9/16/2017 MICROALBUMIN Q1 9/20/2017 LIPID PANEL Q1 9/20/2017 Allergies as of 5/2/2017  Review Complete On: 5/2/2017 By: Raymond Donovan LPN No Known Allergies Current Immunizations  Never Reviewed No immunizations on file. Not reviewed this visit You Were Diagnosed With   
  
 Codes Comments Essential hypertension    -  Primary ICD-10-CM: I10 
ICD-9-CM: 401.9 Vitals BP Pulse Temp Height(growth percentile) Weight(growth percentile) LMP  
 124/82 (BP 1 Location: Left arm, BP Patient Position: Sitting) 69 98 °F (36.7 °C) (Oral) 5' 1\" (1.549 m) 162 lb 6.4 oz (73.7 kg) 11/29/2010 SpO2 BMI OB Status Smoking Status 98% 30.69 kg/m2 Postmenopausal Never Smoker BMI and BSA Data Body Mass Index Body Surface Area  
 30.69 kg/m 2 1.78 m 2 Preferred Pharmacy Pharmacy Name Phone [id number]- Yazmin Keepers, [address] [place] [phone] Your Updated Medication List  
  
   
This list is accurate as of: 5/2/17  4:16 PM.  Always use your most recent med list.  
  
  
  
  
 aspirin delayed-release 81 mg tablet Take  by mouth daily. butalbital-acetaminophen-caffeine -40 mg per tablet Commonly known as:  FIORICET, ESGIC  
TAKE 1 TABLET EVERY 4 HOURS AS NEEDED  
  
 glucose blood VI test strips strip Commonly known as:  ASCENSIA AUTODISC VI, ONE TOUCH ULTRA TEST VI One Touch Ultra 2 meter Check blood sugars daily. ICD-10 E11.620  
  
 hydroCHLOROthiazide 25 mg tablet Commonly known as:  HYDRODIURIL Take 1 Tab by mouth daily. HYDROcodone-acetaminophen 5-325 mg per tablet Commonly known as:  Karin Mandril Take 1-2 Tabs by mouth every four (4) hours as needed for Pain. Max Daily Amount: 12 Tabs. hydrocortisone 2.5 % topical cream  
Commonly known as:  HYTONE Apply  to affected area two (2) times a day. use thin layer  
  
 ibuprofen 600 mg tablet Commonly known as:  MOTRIN  
TAKE 1 TAB BY MOUTH EVERY EIGHT (8) HOURS AS NEEDED FOR PAIN. Lancets Misc One Touch Ultra 2 meter. Check blood sugars daily. ICD-10 E11.620  
  
 losartan 50 mg tablet Commonly known as:  COZAAR  
TAKE 1 TABLET BY MOUTH ONCE DAILY  
  
 metFORMIN 1,000 mg tablet Commonly known as:  GLUCOPHAGE  
TAKE 1 TABLET BY MOUTH ONCE DAILY  
  
 ondansetron 4 mg disintegrating tablet Commonly known as:  ZOFRAN ODT Take 1 Tab by mouth every eight (8) hours as needed for Nausea. simvastatin 40 mg tablet Commonly known as:  ZOCOR  
TAKE 1 TABLET BY MOUTH EVERY EVENING AT BEDTIME  
  
 timolol 0.5 % ophthalmic solution Commonly known as:  TIMOPTIC Follow-up Instructions Return in about 4 months (around 9/2/2017) for dm-2 htn hld. Introducing [facility] & HEALTH SERVICES!    
 [facility] [employer] introduces [employer] patient portal. Now you can access parts of your medical record, email your doctor's office, and request medication refills online. 1. In your internet browser, go to [email or web address] 2. Click on the First Time User? Click Here link in the Sign In box. You will see the New Member Sign Up page. 3. Enter your [employer] Access Code exactly as it appears below. You will not need to use this code after youve completed the sign-up process. If you do not sign up before the expiration date, you must request a new code. · [employer] Access Code: [email or web address] Expires: 7/31/2017  4:16 PM 
 
4. Enter the last four digits of your Social Security Number (xxxx) and Date of Birth (mm/dd/yyyy) as indicated and click Submit. You will be taken to the next sign-up page. 5. Create a [employer] ID. This will be your [employer] login ID and cannot be changed, so think of one that is secure and easy to remember. 6. Create a [employer] password. You can change your password at any time. 7. Enter your Password Reset Question and Answer. This can be used at a later time if you forget your password. 8. Enter your e-mail address. You will receive e-mail notification when new information is available in [address] 9. Click Sign Up. You can now view and download portions of your medical record. 10. Click the Download Summary menu link to download a portable copy of your medical information. If you have questions, please visit the Frequently Asked Questions section of the [employer] website. Remember, [employer] is NOT to be used for urgent needs. For medical emergencies, dial 911. Now available from your iPhone and Android! Please provide this summary of care documentation to your next provider. Your primary care clinician is listed as Yasmin MARIE. If you have any questions after today's visit, please call [phone].

## 2017-05-22 RX ORDER — IBUPROFEN 600 MG/1
TABLET ORAL
Qty: 30 TAB | Refills: 1 | Status: SHIPPED | OUTPATIENT
Start: 2017-05-22 | End: 2017-10-10 | Stop reason: SDUPTHER

## 2017-05-25 RX ORDER — BUTALBITAL, ACETAMINOPHEN AND CAFFEINE 50; 325; 40 MG/1; MG/1; MG/1
TABLET ORAL
Qty: 20 TAB | Refills: 0 | Status: SHIPPED | OUTPATIENT
Start: 2017-05-25 | End: 2017-07-28 | Stop reason: SDUPTHER

## 2017-06-27 RX ORDER — METFORMIN HYDROCHLORIDE 1000 MG/1
TABLET ORAL
Qty: 90 TAB | Refills: 3 | Status: SHIPPED | OUTPATIENT
Start: 2017-06-27 | End: 2017-12-28 | Stop reason: SDUPTHER

## 2017-06-27 NOTE — TELEPHONE ENCOUNTER
Requested Prescriptions     Pending Prescriptions Disp Refills    metFORMIN (GLUCOPHAGE) 1,000 mg tablet 90 Tab 3   Last Refill:7/8/16    Last Office Visit:5/2/17     Upcoming Appointment: 9/21/17

## 2017-07-27 RX ORDER — SIMVASTATIN 40 MG/1
TABLET, FILM COATED ORAL
Qty: 90 TAB | Refills: 1 | Status: SHIPPED | OUTPATIENT
Start: 2017-07-27 | End: 2017-12-28 | Stop reason: SDUPTHER

## 2017-07-28 RX ORDER — BUTALBITAL, ACETAMINOPHEN AND CAFFEINE 50; 325; 40 MG/1; MG/1; MG/1
TABLET ORAL
Qty: 20 TAB | Refills: 0 | Status: SHIPPED | OUTPATIENT
Start: 2017-07-28 | End: 2018-01-02 | Stop reason: SDUPTHER

## 2017-07-28 RX ORDER — BUTALBITAL, ACETAMINOPHEN AND CAFFEINE 50; 325; 40 MG/1; MG/1; MG/1
TABLET ORAL
Qty: 20 TAB | Refills: 0 | OUTPATIENT
Start: 2017-07-28 | End: 2017-07-28 | Stop reason: SDUPTHER

## 2017-07-28 NOTE — TELEPHONE ENCOUNTER
Pt is requesting refill today if at all possible. She has a headache so bad and OTC is hot helping at all. Please call pt to let her know what you can do.

## 2017-10-10 RX ORDER — IBUPROFEN 600 MG/1
TABLET ORAL
Qty: 30 TAB | Refills: 1 | Status: SHIPPED | OUTPATIENT
Start: 2017-10-10 | End: 2017-12-28 | Stop reason: SDUPTHER

## 2017-11-28 ENCOUNTER — TELEPHONE (OUTPATIENT)
Dept: INTERNAL MEDICINE CLINIC | Age: 57
End: 2017-11-28

## 2017-11-28 NOTE — TELEPHONE ENCOUNTER
Patient states she needs a call back to get her test/lab results from her appt on 11/20/17. Please call to discuss. Thank you      Patient received No Show Letter in Error & Rochelle//PSR Catherine Aviladivine has been informed & is getting appt documentation corrected.  Thank you

## 2017-11-28 NOTE — TELEPHONE ENCOUNTER
Pt inquired about two injections suggested by Dr. Jaxon Ding. Pt best contact [phone].        Message received & copied from [facility]

## 2017-11-30 NOTE — TELEPHONE ENCOUNTER
Spoke with patient after 2 patient identifiers being note and advised per Dr. Laura Snider 3 mos glucose avg is 131 good control of dm-2   Normal fasting glucose,lyteskideny liver thyrodi urine protein   LDL is slgithly above goal--stress compliance with simvastatin . Patient expressed understanding and has no further questions at this time.

## 2017-11-30 NOTE — TELEPHONE ENCOUNTER
----- Message from Dacia Rascon sent at 11/29/2017  5:00 PM EST -----  Regarding: Dr. Jenette Cooks  Pt returning a call for lab results received on 11/28/17. Best Contact [phone].     Message copied/pasted from Elieser Garcia

## 2017-12-05 ENCOUNTER — TELEPHONE (OUTPATIENT)
Dept: INTERNAL MEDICINE CLINIC | Age: 57
End: 2017-12-05

## 2017-12-05 ENCOUNTER — HOSPITAL ENCOUNTER (OUTPATIENT)
Dept: ULTRASOUND IMAGING | Age: 57
Discharge: HOME | End: 2017-12-05
Attending: INTERNAL MEDICINE
Payer: COMMERCIAL

## 2017-12-05 DIAGNOSIS — R10.11 RUQ PAIN: ICD-10-CM

## 2017-12-05 PROCEDURE — 76700 US EXAM ABDOM COMPLETE: CPT

## 2017-12-05 NOTE — TELEPHONE ENCOUNTER
Pt states that she is diabetic and has fatigue, aches and cold symptoms and would like to know what she can take for her cold until she comes in for her appt.

## 2017-12-05 NOTE — TELEPHONE ENCOUNTER
Unable to reach patient LVM to return call, advised that she should ask pharmacist to assist her in finding an OTC cough syrup for diabetics also mucinex is appropriate per Dr. Leela Gonzalez

## 2017-12-07 ENCOUNTER — OFFICE VISIT (OUTPATIENT)
Dept: INTERNAL MEDICINE CLINIC | Age: 57
End: 2017-12-07

## 2017-12-07 VITALS
TEMPERATURE: 97.4 F | WEIGHT: 166 LBS | BODY MASS INDEX: 31.34 KG/M2 | DIASTOLIC BLOOD PRESSURE: 70 MMHG | OXYGEN SATURATION: 100 % | HEIGHT: 61 IN | SYSTOLIC BLOOD PRESSURE: 103 MMHG | HEART RATE: 73 BPM

## 2017-12-07 DIAGNOSIS — M54.50 ACUTE LOW BACK PAIN WITHOUT SCIATICA, UNSPECIFIED BACK PAIN LATERALITY: ICD-10-CM

## 2017-12-07 DIAGNOSIS — J06.9 VIRAL UPPER RESPIRATORY TRACT INFECTION: Primary | ICD-10-CM

## 2017-12-07 NOTE — MR AVS SNAPSHOT
Visit Information Date & Time Provider Department Dept. Phone Encounter #  
 12/7/2017  8:15 AM Laureano Ashley, [address] [phone] [id number] Follow-up Instructions Return if symptoms worsen or fail to improve. Upcoming Health Maintenance Date Due Hepatitis C Screening 1960 EYE EXAM RETINAL OR DILATED Q1 5/30/1972 DTaP/Tdap/Td series (1 - Tdap) 5/30/1983 PAP AKA CERVICAL CYTOLOGY 5/30/1983 FOBT Q 1 YEAR AGE 50-75 5/30/2012 FOOT EXAM Q1 7/21/2016 BREAST CANCER SCRN MAMMOGRAM 8/19/2017 HEMOGLOBIN A1C Q6M 5/20/2018 MICROALBUMIN Q1 11/20/2018 LIPID PANEL Q1 11/20/2018 Allergies as of 12/7/2017  Review Complete On: 12/7/2017 By: Chata Valencia LPN No Known Allergies Current Immunizations  Never Reviewed No immunizations on file. Not reviewed this visit You Were Diagnosed With   
  
 Codes Comments Viral upper respiratory tract infection    -  Primary ICD-10-CM: J06.9, B97.89 ICD-9-CM: 465.9 Acute low back pain without sciatica, unspecified back pain laterality     ICD-10-CM: M54.5 ICD-9-CM: 724.2 Vitals BP Pulse Temp Height(growth percentile) Weight(growth percentile) LMP  
 103/70 (BP 1 Location: Left arm, BP Patient Position: Sitting) 73 97.4 °F (36.3 °C) (Oral) 5' 1\" (1.549 m) 166 lb (75.3 kg) 11/29/2010 SpO2 BMI OB Status Smoking Status 100% 31.37 kg/m2 Postmenopausal Never Smoker BMI and BSA Data Body Mass Index Body Surface Area  
 31.37 kg/m 2 1.8 m 2 Preferred Pharmacy Pharmacy Name Phone [id number]- [place]jal Meckel, [address] [place] [phone] Your Updated Medication List  
  
   
This list is accurate as of: 12/7/17  9:31 AM.  Always use your most recent med list.  
  
  
  
  
 aspirin delayed-release 81 mg tablet Take  by mouth daily. butalbital-acetaminophen-caffeine -40 mg per tablet Commonly known as:  FIORICET, ESGIC  
TAKE 1 TABLET BY MOUTH EVERY 4 HOURS AS NEEDED  
  
 glucose blood VI test strips strip Commonly known as:  ASCENSIA AUTODISC VI, ONE TOUCH ULTRA TEST VI One Touch Ultra 2 meter Check blood sugars daily. ICD-10 E11.620  
  
 hydroCHLOROthiazide 25 mg tablet Commonly known as:  HYDRODIURIL Take 1 Tab by mouth daily. hydrocortisone 2.5 % topical cream  
Commonly known as:  HYTONE Apply  to affected area two (2) times a day. use thin layer  
  
 ibuprofen 600 mg tablet Commonly known as:  MOTRIN  
TAKE 1 TAB BY MOUTH EVERY EIGHT (8) HOURS AS NEEDED FOR PAIN. Lancets Misc One Touch Ultra 2 meter. Check blood sugars daily. ICD-10 E11.620  
  
 losartan 50 mg tablet Commonly known as:  COZAAR  
TAKE 1 TABLET BY MOUTH ONCE DAILY  
  
 metFORMIN 1,000 mg tablet Commonly known as:  GLUCOPHAGE  
TAKE 1 TABLET BY MOUTH ONCE DAILY  
  
 ondansetron 4 mg disintegrating tablet Commonly known as:  ZOFRAN ODT Take 1 Tab by mouth every eight (8) hours as needed for Nausea. simvastatin 40 mg tablet Commonly known as:  ZOCOR  
TAKE 1 TABLET BY MOUTH EVERY EVENING AT BEDTIME  
  
 timolol 0.5 % ophthalmic solution Commonly known as:  TIMOPTIC Follow-up Instructions Return if symptoms worsen or fail to improve. To-Do List   
 12/14/2017 10:00 AM  
  Appointment with [facility] at [address] [place] [address] ([phone]) Shower or bathe using soap and water. Do not use deodorant, powder, perfumes, or lotion the day of your exam.  If your prior mammograms were not performed at [facility] please bring films with you or forward prior images 2 days before your procedure. Check in at registration 15min before your appointment time unless you were instructed to do otherwise. A script is not necessary, but if you have one, please bring it on the day of the mammogram or have it faxed to the department. [facility] [phone] [facility] [place]  [phone]  [phone] [facility] [phone] [place] [address] [facility] Delevan Ashley [phone] Introducing [facility] & HEALTH SERVICES! [employer] introduces [employer] patient portal. Now you can access parts of your medical record, email your doctor's office, and request medication refills online. 1. In your internet browser, go to [email or web address] 2. Click on the First Time User? Click Here link in the Sign In box. You will see the New Member Sign Up page. 3. Enter your [employer] Access Code exactly as it appears below. You will not need to use this code after youve completed the sign-up process. If you do not sign up before the expiration date, you must request a new code. · [employer] Access Code: [email or web address] Expires: 2/18/2018 12:09 PM 
 
4. Enter the last four digits of your Social Security Number (xxxx) and Date of Birth (mm/dd/yyyy) as indicated and click Submit. You will be taken to the next sign-up page. 5. Create a [employer] ID. This will be your [employer] login ID and cannot be changed, so think of one that is secure and easy to remember. 6. Create a [employer] password. You can change your password at any time. 7. Enter your Password Reset Question and Answer. This can be used at a later time if you forget your password. 8. Enter your e-mail address. You will receive e-mail notification when new information is available in [address] 9. Click Sign Up. You can now view and download portions of your medical record. 10. Click the Download Summary menu link to download a portable copy of your medical information. If you have questions, please visit the Frequently Asked Questions section of the [employer] website. Remember, [employer] is NOT to be used for urgent needs. For medical emergencies, dial 911. Now available from your iPhone and Android! Please provide this summary of care documentation to your next provider. Your primary care clinician is listed as Farley Babinski LEE. If you have any questions after today's visit, please call [phone].

## 2017-12-07 NOTE — PROGRESS NOTES
Patient is here today for acute visit  Complaining of cold  Symptoms. Cold symptoms are some what better per    patient but aching back and right leg is the problem today.

## 2017-12-07 NOTE — LETTER
NOTIFICATION RETURN TO WORK / SCHOOL 
 
12/7/2017 9:29 AM 
 
Ms. Carlota Dietrich [address] [phone] To Whom It May Concern: 
 
Carlota Dietrich is currently under the care of [facility]. She will return to work/school on: 12-9-17 Doctor's visit on 12-7-17. If there are questions or concerns please have the patient contact our office.  
 
 
 
Sincerely, 
 
 
Norman Hastings MD

## 2017-12-07 NOTE — PROGRESS NOTES
HISTORY OF PRESENT ILLNESS  Geni Johsnon is a 56 y.o. female. HPI   Acute care visit  C/o feeling sick for a few days but getting better-just achiness. No f/c cough       Employer requests that she gets a flu shot since she is in       Patient Active Problem List    Diagnosis Date Noted    Peripheral neuropathy 01/30/2012    Bronchitis 06/19/2009    Diabetes mellitus ([address] 06/19/2009    Diabetic gastroparesis ([facility] [address] 06/19/2009    HTN (hypertension) 06/19/2009    Hypercholesterolemia 06/19/2009    Osteoarthritis 06/19/2009     Current Outpatient Prescriptions   Medication Sig Dispense Refill    simvastatin (ZOCOR) 40 mg tablet TAKE 1 TABLET BY MOUTH EVERY EVENING AT BEDTIME 90 Tab 1    metFORMIN (GLUCOPHAGE) 1,000 mg tablet TAKE 1 TABLET BY MOUTH ONCE DAILY 90 Tab 3    glucose blood VI test strips (ASCENSIA AUTODISC VI, ONE TOUCH ULTRA TEST VI) strip One Touch Ultra 2 meter Check blood sugars daily. ICD-10 E11.620 50 Strip 11    Lancets misc One Touch Ultra 2 meter. Check blood sugars daily. ICD-10 E11.620 100 Each 11    losartan (COZAAR) 50 mg tablet TAKE 1 TABLET BY MOUTH ONCE DAILY 90 Tab 3    hydroCHLOROthiazide (HYDRODIURIL) 25 mg tablet Take 1 Tab by mouth daily. 90 Tab 3    aspirin delayed-release 81 mg tablet Take  by mouth daily.  timolol (TIMOPTIC) 0.5 % ophthalmic solution   4    ibuprofen (MOTRIN) 600 mg tablet TAKE 1 TAB BY MOUTH EVERY EIGHT (8) HOURS AS NEEDED FOR PAIN. 30 Tab 1    butalbital-acetaminophen-caffeine (FIORICET, ESGIC) -40 mg per tablet TAKE 1 TABLET BY MOUTH EVERY 4 HOURS AS NEEDED 20 Tab 0    ondansetron (ZOFRAN ODT) 4 mg disintegrating tablet Take 1 Tab by mouth every eight (8) hours as needed for Nausea. 10 Tab 0    hydrocortisone (HYTONE) 2.5 % topical cream Apply  to affected area two (2) times a day. use thin layer 30 g 0     No Known Allergies        ROS    Physical Exam   Constitutional: She appears well-developed and well-nourished. Appears stated age   HENT:   Mouth/Throat: Oropharynx is clear and moist.   Cardiovascular: Normal rate, regular rhythm and normal heart sounds. Exam reveals no gallop and no friction rub. No murmur heard. Pulmonary/Chest: Effort normal and breath sounds normal. No respiratory distress. She has no wheezes. Abdominal: Soft. Bowel sounds are normal.   Musculoskeletal: She exhibits no edema. Normal lower back rom   Lymphadenopathy:     She has no cervical adenopathy. Neurological: She is alert. Psychiatric: She has a normal mood and affect. Nursing note and vitals reviewed. ASSESSMENT and PLAN  Diagnoses and all orders for this visit:    1. Viral upper respiratory tract infection   otc medicines    Work letter  2. Acute low back pain without sciatica, unspecified back pain laterality   Conservative tx for strain   Work letter  Pt may return next week for flu shot  Follow-up Disposition:  Return if symptoms worsen or fail to improve.

## 2017-12-14 ENCOUNTER — HOSPITAL ENCOUNTER (OUTPATIENT)
Dept: MAMMOGRAPHY | Age: 57
Discharge: HOME | End: 2017-12-14
Attending: INTERNAL MEDICINE
Payer: COMMERCIAL

## 2017-12-14 DIAGNOSIS — Z12.39 BREAST SCREENING: ICD-10-CM

## 2017-12-14 PROCEDURE — 77067 SCR MAMMO BI INCL CAD: CPT

## 2017-12-28 ENCOUNTER — TELEPHONE (OUTPATIENT)
Dept: INTERNAL MEDICINE CLINIC | Age: 57
End: 2017-12-28

## 2017-12-28 RX ORDER — METFORMIN HYDROCHLORIDE 1000 MG/1
TABLET ORAL
Qty: 90 TAB | Refills: 3 | Status: SHIPPED | OUTPATIENT
Start: 2017-12-28 | End: 2019-03-24 | Stop reason: SDUPTHER

## 2017-12-28 RX ORDER — SIMVASTATIN 40 MG/1
TABLET, FILM COATED ORAL
Qty: 90 TAB | Refills: 1 | Status: SHIPPED | OUTPATIENT
Start: 2017-12-28 | End: 2020-04-30

## 2017-12-28 RX ORDER — ONDANSETRON 4 MG/1
4 TABLET, ORALLY DISINTEGRATING ORAL
Qty: 10 TAB | Refills: 0 | Status: SHIPPED | OUTPATIENT
Start: 2017-12-28 | End: 2020-02-05 | Stop reason: SDUPTHER

## 2017-12-28 RX ORDER — HYDROCHLOROTHIAZIDE 25 MG/1
25 TABLET ORAL DAILY
Qty: 90 TAB | Refills: 3 | Status: SHIPPED | OUTPATIENT
Start: 2017-12-28 | End: 2020-07-28

## 2017-12-28 RX ORDER — TIMOLOL MALEATE 5 MG/ML
1 SOLUTION/ DROPS OPHTHALMIC DAILY
Qty: 10 ML | Refills: 4 | Status: SHIPPED | OUTPATIENT
Start: 2017-12-28

## 2017-12-28 RX ORDER — BUTALBITAL, ACETAMINOPHEN AND CAFFEINE 50; 325; 40 MG/1; MG/1; MG/1
TABLET ORAL
Qty: 20 TAB | Refills: 0 | Status: CANCELLED | OUTPATIENT
Start: 2017-12-28

## 2017-12-28 RX ORDER — LOSARTAN POTASSIUM 50 MG/1
TABLET ORAL
Qty: 90 TAB | Refills: 3 | Status: SHIPPED | OUTPATIENT
Start: 2017-12-28 | End: 2019-01-29 | Stop reason: SDUPTHER

## 2017-12-28 RX ORDER — HYDROCORTISONE 25 MG/G
CREAM TOPICAL 2 TIMES DAILY
Qty: 30 G | Refills: 0 | Status: SHIPPED | OUTPATIENT
Start: 2017-12-28

## 2017-12-28 RX ORDER — LANCETS
EACH MISCELLANEOUS
Qty: 100 EACH | Refills: 11 | Status: SHIPPED | OUTPATIENT
Start: 2017-12-28

## 2017-12-28 RX ORDER — IBUPROFEN 600 MG/1
TABLET ORAL
Qty: 60 TAB | Refills: 1 | Status: SHIPPED | OUTPATIENT
Start: 2017-12-28 | End: 2018-10-12 | Stop reason: SDUPTHER

## 2017-12-28 RX ORDER — ASPIRIN 81 MG/1
81 TABLET ORAL DAILY
Qty: 90 TAB | Refills: 3 | Status: SHIPPED | OUTPATIENT
Start: 2017-12-28 | End: 2018-12-21 | Stop reason: SDUPTHER

## 2017-12-28 NOTE — TELEPHONE ENCOUNTER
#[phone] pt is loosing insurance on 12-31-17 and wants to know if she can get refills on her medications? Please call.

## 2018-01-02 RX ORDER — BUTALBITAL, ACETAMINOPHEN AND CAFFEINE 50; 325; 40 MG/1; MG/1; MG/1
TABLET ORAL
Qty: 20 TAB | Refills: 0 | Status: SHIPPED | OUTPATIENT
Start: 2018-01-02 | End: 2020-03-05

## 2018-01-02 RX ORDER — MECLIZINE HYDROCHLORIDE 25 MG/1
25 TABLET ORAL
Qty: 30 TAB | Refills: 0 | Status: SHIPPED | OUTPATIENT
Start: 2018-01-02 | End: 2018-01-12

## 2018-01-02 NOTE — TELEPHONE ENCOUNTER
#[phone] pt needs these filled even though her insurance has . Pt needs to know if she can take these with all her other medications at the same time? Please call to advise. Pt was informed of 48-72 hour turn around.

## 2018-10-13 RX ORDER — IBUPROFEN 600 MG/1
TABLET ORAL
Qty: 60 TAB | Refills: 1 | Status: SHIPPED | OUTPATIENT
Start: 2018-10-13 | End: 2018-12-06 | Stop reason: SDUPTHER

## 2018-12-06 RX ORDER — IBUPROFEN 600 MG/1
TABLET ORAL
Qty: 60 TAB | Refills: 1 | Status: SHIPPED | OUTPATIENT
Start: 2018-12-06 | End: 2019-12-28

## 2018-12-21 RX ORDER — ASPIRIN 81 MG/1
81 TABLET ORAL DAILY
Qty: 90 TAB | Refills: 3 | Status: SHIPPED | OUTPATIENT
Start: 2018-12-21 | End: 2020-02-20

## 2019-01-29 RX ORDER — LOSARTAN POTASSIUM 50 MG/1
TABLET ORAL
Qty: 90 TAB | Refills: 3 | Status: SHIPPED | OUTPATIENT
Start: 2019-01-29 | End: 2020-08-25 | Stop reason: SDUPTHER

## 2019-03-24 RX ORDER — METFORMIN HYDROCHLORIDE 1000 MG/1
TABLET ORAL
Qty: 90 TAB | Refills: 3 | Status: SHIPPED | OUTPATIENT
Start: 2019-03-24 | End: 2020-02-20

## 2019-12-28 RX ORDER — IBUPROFEN 600 MG/1
TABLET ORAL
Qty: 60 TAB | Refills: 1 | Status: SHIPPED | OUTPATIENT
Start: 2019-12-28 | End: 2020-09-01 | Stop reason: SDUPTHER

## 2020-02-05 ENCOUNTER — OFFICE VISIT (OUTPATIENT)
Dept: INTERNAL MEDICINE CLINIC | Age: 60
End: 2020-02-05

## 2020-02-05 VITALS
RESPIRATION RATE: 16 BRPM | BODY MASS INDEX: 32.93 KG/M2 | SYSTOLIC BLOOD PRESSURE: 116 MMHG | DIASTOLIC BLOOD PRESSURE: 71 MMHG | OXYGEN SATURATION: 100 % | HEIGHT: 61 IN | TEMPERATURE: 98.2 F | HEART RATE: 65 BPM | WEIGHT: 174.4 LBS

## 2020-02-05 DIAGNOSIS — E78.00 PURE HYPERCHOLESTEROLEMIA: ICD-10-CM

## 2020-02-05 DIAGNOSIS — I10 ESSENTIAL HYPERTENSION: ICD-10-CM

## 2020-02-05 DIAGNOSIS — Z12.11 COLON CANCER SCREENING: ICD-10-CM

## 2020-02-05 DIAGNOSIS — Z12.39 BREAST SCREENING: ICD-10-CM

## 2020-02-05 DIAGNOSIS — E11.9 CONTROLLED TYPE 2 DIABETES MELLITUS WITHOUT COMPLICATION, WITHOUT LONG-TERM CURRENT USE OF INSULIN (HCC): Primary | ICD-10-CM

## 2020-02-05 DIAGNOSIS — Z11.59 ENCOUNTER FOR HEPATITIS C SCREENING TEST FOR LOW RISK PATIENT: ICD-10-CM

## 2020-02-05 RX ORDER — LANCETS
EACH MISCELLANEOUS
Qty: 1 EACH | Refills: 11 | Status: SHIPPED | OUTPATIENT
Start: 2020-02-05

## 2020-02-05 RX ORDER — ONDANSETRON 4 MG/1
4 TABLET, ORALLY DISINTEGRATING ORAL
Qty: 20 TAB | Refills: 0 | Status: SHIPPED | OUTPATIENT
Start: 2020-02-05 | End: 2022-10-14 | Stop reason: ALTCHOICE

## 2020-02-05 RX ORDER — INSULIN PUMP SYRINGE, 3 ML
EACH MISCELLANEOUS
Qty: 1 KIT | Refills: 0 | Status: SHIPPED | OUTPATIENT
Start: 2020-02-05

## 2020-02-05 NOTE — PROGRESS NOTES
HISTORY OF PRESENT ILLNESS  Gracy Son is a 64 y.o. female. HPI   F/u DM-2 HTN HLD tension headaches    fsbs at home-none , needs glucometer  Saw another PCP Dr Denver Goad last year d/t insurance change--a1c was above 7 last June    No recent fsbs checks    Last OV  Here for CPE  Has DM-2 ( metformin)--fsbs 120-140 in mornings  F/u HTN and leg edema  Taking higher dose of cozaar and hctz since last visit  Leg edema is new since last visit  Weight is down slightly  Has had rigfht side pain worse 30 min after eating fatty food x 1 week  No n/v  Some recurrence of headaches--fioricet helped recently  In school to become a     Patient Active Problem List    Diagnosis Date Noted    Peripheral neuropathy 01/30/2012    Bronchitis 06/19/2009    Diabetes mellitus ([address] 06/19/2009    Diabetic [facility]rop[facility] ([facility] [address] 06/19/2009    HTN (hypertension) 06/19/2009    Hypercholesterolemia 06/19/2009    Osteoarthritis 06/19/2009     Current Outpatient Medications   Medication Sig Dispense Refill    ondansetron (ZOFRAN ODT) 4 mg disintegrating tablet Take 1 Tab by mouth every eight (8) hours as needed for Nausea. 20 Tab 0    Blood-Glucose Meter monitoring kit Check fsbs every day 250.02 1 Kit 0    glucose blood VI test strips (ASCENSIA AUTODISC VI, ONE TOUCH ULTRA TEST VI) strip Check fsbs every day 250.02 100 Strip 5    lancets misc Check fsbs every day 250.02 1 Each 11    ibuprofen (MOTRIN) 600 mg tablet TAKE 1 TAB BY MOUTH EVERY EIGHT (8) HOURS AS NEEDED FOR PAIN. 60 Tab 1    metFORMIN (GLUCOPHAGE) 1,000 mg tablet TAKE 1 TABLET BY MOUTH ONCE DAILY 90 Tab 3    losartan (COZAAR) 50 mg tablet TAKE 1 TABLET BY MOUTH ONCE DAILY 90 Tab 3    aspirin delayed-release 81 mg tablet TAKE 1 TAB BY MOUTH DAILY.  90 Tab 3    butalbital-acetaminophen-caffeine (FIORICET, ESGIC) -40 mg per tablet TAKE 1 TABLET BY MOUTH EVERY 4 HOURS AS NEEDED 20 Tab 0    glucose blood VI test strips (ASCENSIA AUTODISC VI, ONE TOUCH ULTRA TEST VI) strip One Touch Ultra 2 meter Check blood sugars daily. ICD-10 E11.620 50 Strip 11    hydroCHLOROthiazide (HYDRODIURIL) 25 mg tablet Take 1 Tab by mouth daily. 90 Tab 3    hydrocortisone (HYTONE) 2.5 % topical cream Apply  to affected area two (2) times a day. use thin layer 30 g 0    Lancets misc One Touch Ultra 2 meter. Check blood sugars daily. ICD-10 E11.620 100 Each 11    simvastatin (ZOCOR) 40 mg tablet TAKE 1 TABLET BY MOUTH EVERY EVENING AT BEDTIME 90 Tab 1    timolol (TIMOPTIC) 0.5 % ophthalmic solution Administer 1 Drop to both eyes daily. 10 mL 4     No Known Allergies  Social History     Tobacco Use    Smoking status: Never Smoker    Smokeless tobacco: Never Used   Substance Use Topics    Alcohol use: No      Lab Results   Component Value Date/Time    Hemoglobin A1c 6.2 (H) 11/20/2017 12:34 PM    Hemoglobin A1c 6.6 (H) 03/16/2017 10:47 AM    Hemoglobin A1c 6.4 (H) 09/20/2016 11:36 AM    Glucose 91 11/20/2017 12:34 PM    Glucose  (A) 12/22/2015 10:15 AM    Microalb/Creat ratio (ug/mg creat.) <5.5 11/20/2017 12:34 PM    LDL, calculated 111 (H) 11/20/2017 12:34 PM    Creatinine 0.85 11/20/2017 12:34 PM      Lab Results   Component Value Date/Time    Cholesterol, total 177 11/20/2017 12:34 PM    HDL Cholesterol 52 11/20/2017 12:34 PM    LDL, calculated 111 (H) 11/20/2017 12:34 PM    Triglyceride 68 11/20/2017 12:34 PM    CHOL/HDL Ratio 3.2 07/07/2010 12:14 PM     Lab Results   Component Value Date/Time    ALT (SGPT) 17 11/20/2017 12:34 PM    AST (SGOT) 22 11/20/2017 12:34 PM    Alk.  phosphatase 75 11/20/2017 12:34 PM    Bilirubin, direct 0.1 07/07/2010 12:15 PM    Bilirubin, total 0.3 11/20/2017 12:34 PM    Albumin 4.3 11/20/2017 12:34 PM    Protein, total 6.8 11/20/2017 12:34 PM    PLATELET 147 37/64/0631 10:47 AM     Lab Results   Component Value Date/Time    GFR est non-AA 77 11/20/2017 12:34 PM    GFR est AA 89 11/20/2017 12:34 PM    Creatinine 0.85 11/20/2017 12:34 PM BUN 25 (H) 11/20/2017 12:34 PM    Sodium 147 (H) 11/20/2017 12:34 PM    Potassium 4.2 11/20/2017 12:34 PM    Chloride 103 11/20/2017 12:34 PM    CO2 25 11/20/2017 12:34 PM        ROS    Physical Exam  Vitals signs and nursing note reviewed. Constitutional:       Appearance: She is well-developed. She is obese. Comments: Appears stated age   Cardiovascular:      Rate and Rhythm: Normal rate and regular rhythm. Heart sounds: Normal heart sounds. No murmur. No friction rub. No gallop. Pulmonary:      Effort: Pulmonary effort is normal. No respiratory distress. Breath sounds: Normal breath sounds. No wheezing. Abdominal:      General: Bowel sounds are normal.      Palpations: Abdomen is soft. Neurological:      Mental Status: She is alert. ASSESSMENT and PLAN  Diagnoses and all orders for this visit:    1. Controlled type 2 diabetes mellitus without complication, without long-term current use of insulin (HCC)  -     METABOLIC PANEL, COMPREHENSIVE  -     HEMOGLOBIN A1C WITH EAG  -     MICROALBUMIN, UR, RAND W/ MICROALB/CREAT RATIO  -     REFERRAL TO ENDOCRINOLOGY   Sent rx for glucometer ,strips etc   Need to check fsbs , work on diet, exercise   Might need to add 2nd oral med or take metformin bid  2. Essential hypertension  -     METABOLIC PANEL, COMPREHENSIVE  -     CBC W/O DIFF   controlled  3. Pure hypercholesterolemia  -     METABOLIC PANEL, COMPREHENSIVE  -     LIPID PANEL  -     TSH 3RD GENERATION   On statin  4. Colon cancer screening  -     REFERRAL TO GASTROENTEROLOGY    5. Encounter for hepatitis C screening test for low risk patient  -     HEPATITIS C AB    6. Breast screening  -     [place] MAMMO BI SCREENING INCL CAD; Future  -     REFERRAL TO OBSTETRICS AND GYNECOLOGY    Other orders  -     ondansetron (ZOFRAN ODT) 4 mg disintegrating tablet; Take 1 Tab by mouth every eight (8) hours as needed for Nausea. -     Blood-Glucose Meter monitoring kit;  Check fsbs every day 250.02  -     glucose blood VI test strips (ASCENSIA AUTODISC VI, ONE TOUCH ULTRA TEST VI) strip; Check fsbs every day 250.02  -     lancets misc; Check fsbs every day 250.02      Follow-up and Dispositions    · Return in about 6 months (around 8/5/2020) for dm2- htn hld.

## 2020-02-05 NOTE — PROGRESS NOTES
Identified pt with two pt identifiers(name and ). Reviewed record in preparation for visit and have obtained necessary documentation. Chief Complaint   Patient presents with    Diabetes      Visit Vitals  /71 (BP 1 Location: Left arm, BP Patient Position: Sitting)   Pulse 65   Temp 98.2 °F (36.8 °C) (Oral)   Resp 16   Ht 5' 1\" (1.549 m)   Wt 174 lb 6.4 oz (79.1 kg)   LMP 2010   SpO2 100%   BMI 32.95 kg/m²       Pain Scale: 0 - No pain/10  Pain Location:     Health Maintenance Due   Topic    Hepatitis C Screening     Pneumococcal 0-64 years (1 of 1 - PPSV23)    Eye Exam Retinal or Dilated     DTaP/Tdap/Td series (1 - Tdap)    PAP AKA CERVICAL CYTOLOGY     Shingrix Vaccine Age 50> (1 of 2)    FOBT Q1Y Age 54-65     Foot Exam Q1     A1C test (Diabetic or Prediabetic)     MICROALBUMIN Q1     Lipid Screen     Influenza Age 5 to Adult     Breast Cancer Screen Mammogram        Coordination of Care Questionnaire:  :   1) Have you been to an emergency room, urgent care, or hospitalized since your last visit? If yes, where when, and reason for visit? no       2. Have seen or consulted any other health care provider since your last visit? If yes, where when, and reason for visit? NO      3) Do you have an Advanced Directive/ Living Will in place? NO  If yes, do we have a copy on file NO  If no, would you like information NO    Patient is accompanied by self I have received verbal consent from Agatha Rowland to discuss any/all medical information while they are present in the room.

## 2020-02-06 LAB
ALBUMIN SERPL-MCNC: 3.9 G/DL (ref 3.8–4.9)
ALBUMIN/CREAT UR: <6 MG/G CREAT (ref 0–29)
ALBUMIN/GLOB SERPL: 1.4 {RATIO} (ref 1.2–2.2)
ALP SERPL-CCNC: 81 IU/L (ref 39–117)
ALT SERPL-CCNC: 18 IU/L (ref 0–32)
AST SERPL-CCNC: 16 IU/L (ref 0–40)
BILIRUB SERPL-MCNC: 0.3 MG/DL (ref 0–1.2)
BUN SERPL-MCNC: 18 MG/DL (ref 6–24)
BUN/CREAT SERPL: 21 (ref 9–23)
CALCIUM SERPL-MCNC: 9.3 MG/DL (ref 8.7–10.2)
CHLORIDE SERPL-SCNC: 103 MMOL/L (ref 96–106)
CHOLEST SERPL-MCNC: 151 MG/DL (ref 100–199)
CO2 SERPL-SCNC: 24 MMOL/L (ref 20–29)
CREAT SERPL-MCNC: 0.84 MG/DL (ref 0.57–1)
CREAT UR-MCNC: 52.7 MG/DL
ERYTHROCYTE [DISTWIDTH] IN BLOOD BY AUTOMATED COUNT: 13.5 % (ref 11.7–15.4)
EST. AVERAGE GLUCOSE BLD GHB EST-MCNC: 143 MG/DL
GLOBULIN SER CALC-MCNC: 2.8 G/DL (ref 1.5–4.5)
GLUCOSE SERPL-MCNC: 114 MG/DL (ref 65–99)
HBA1C MFR BLD: 6.6 % (ref 4.8–5.6)
HCT VFR BLD AUTO: 35.1 % (ref 34–46.6)
HCV AB S/CO SERPL IA: <0.1 S/CO RATIO (ref 0–0.9)
HDLC SERPL-MCNC: 52 MG/DL
HGB BLD-MCNC: 11.7 G/DL (ref 11.1–15.9)
LDLC SERPL CALC-MCNC: 88 MG/DL (ref 0–99)
MCH RBC QN AUTO: 29.3 PG (ref 26.6–33)
MCHC RBC AUTO-ENTMCNC: 33.3 G/DL (ref 31.5–35.7)
MCV RBC AUTO: 88 FL (ref 79–97)
MICROALBUMIN UR-MCNC: <3 UG/ML
PLATELET # BLD AUTO: 268 X10E3/UL (ref 150–450)
POTASSIUM SERPL-SCNC: 3.9 MMOL/L (ref 3.5–5.2)
PROT SERPL-MCNC: 6.7 G/DL (ref 6–8.5)
RBC # BLD AUTO: 4 X10E6/UL (ref 3.77–5.28)
SODIUM SERPL-SCNC: 144 MMOL/L (ref 134–144)
TRIGL SERPL-MCNC: 55 MG/DL (ref 0–149)
TSH SERPL DL<=0.005 MIU/L-ACNC: 1.66 UIU/ML (ref 0.45–4.5)
VLDLC SERPL CALC-MCNC: 11 MG/DL (ref 5–40)
WBC # BLD AUTO: 8.9 X10E3/UL (ref 3.4–10.8)

## 2020-02-20 RX ORDER — ASPIRIN 81 MG/1
81 TABLET ORAL DAILY
Qty: 90 TAB | Refills: 3 | Status: SHIPPED | OUTPATIENT
Start: 2020-02-20 | End: 2020-11-03 | Stop reason: SDUPTHER

## 2020-02-20 RX ORDER — METFORMIN HYDROCHLORIDE 1000 MG/1
TABLET ORAL
Qty: 90 TAB | Refills: 3 | Status: SHIPPED | OUTPATIENT
Start: 2020-02-20 | End: 2021-03-29

## 2020-02-21 ENCOUNTER — HOSPITAL ENCOUNTER (OUTPATIENT)
Dept: MAMMOGRAPHY | Age: 60
Discharge: HOME | End: 2020-02-21
Attending: INTERNAL MEDICINE
Payer: COMMERCIAL

## 2020-02-21 DIAGNOSIS — Z12.39 BREAST SCREENING: ICD-10-CM

## 2020-02-21 PROCEDURE — 77067 SCR MAMMO BI INCL CAD: CPT

## 2020-04-14 ENCOUNTER — TELEPHONE (OUTPATIENT)
Dept: INTERNAL MEDICINE CLINIC | Age: 60
End: 2020-04-14

## 2020-04-14 RX ORDER — BUTALBITAL, ACETAMINOPHEN AND CAFFEINE 50; 325; 40 MG/1; MG/1; MG/1
TABLET ORAL
Qty: 30 TAB | Refills: 0 | Status: SHIPPED | OUTPATIENT
Start: 2020-04-14 | End: 2020-11-03 | Stop reason: SDUPTHER

## 2020-04-14 NOTE — TELEPHONE ENCOUNTER
Patient called to ask about her right ear it gets stopped up. This has been going on for months off and on. Please call back. Thanks.

## 2020-04-14 NOTE — TELEPHONE ENCOUNTER
Called, spoke to pt. Two identifiers confirmed. Pt stated that her ears constantly pop in and out. Recommended pt try taking allergy medications. Contact information provided for Dr. Ha Sullivan. Pt verbalized understanding of information discussed w/ no further questions at this time.

## 2020-04-14 NOTE — TELEPHONE ENCOUNTER
PCP: Alena Salinas MD    Last appt: 2/5/2020  Future Appointments   Date Time Provider Dede Mason   6/22/2020  2:50 PM Rosy Monreal MD RDE [facility] [address] [facility]       Requested Prescriptions     Pending Prescriptions Disp Refills    butalbital-acetaminophen-caffeine (FIORICET, ESGIC) -40 mg per tablet 30 Tab 0     Sig: TAKE 1 TABLET EVERY 4 HOURS AS NEEDED

## 2020-04-30 RX ORDER — ROSUVASTATIN CALCIUM 20 MG/1
TABLET, COATED ORAL
Qty: 30 TAB | Refills: 2 | Status: SHIPPED | OUTPATIENT
Start: 2020-04-30 | End: 2020-07-28

## 2020-04-30 RX ORDER — LOSARTAN POTASSIUM 100 MG/1
TABLET ORAL
Qty: 90 TAB | Refills: 0 | Status: SHIPPED | OUTPATIENT
Start: 2020-04-30 | End: 2020-07-28

## 2020-06-08 ENCOUNTER — TELEPHONE (OUTPATIENT)
Dept: INTERNAL MEDICINE CLINIC | Age: 60
End: 2020-06-08

## 2020-06-08 NOTE — TELEPHONE ENCOUNTER
Patient's first and last name: Renetta Franco   : 1960   ID numbers: [id number]     Caller's first and last name: pt   Reason for call: Medication clarification   Callback required yes/no and why: yes   Best contact number(s): [phone]   Details to clarify the request: Pt has question about medication Metformin that has been recalled. Pt would like a call back to discuss.

## 2020-07-28 RX ORDER — ROSUVASTATIN CALCIUM 20 MG/1
TABLET, COATED ORAL
Qty: 30 TAB | Refills: 2 | Status: SHIPPED | OUTPATIENT
Start: 2020-07-28 | End: 2020-11-03 | Stop reason: SDUPTHER

## 2020-07-28 RX ORDER — LOSARTAN POTASSIUM 100 MG/1
TABLET ORAL
Qty: 30 TAB | Refills: 2 | Status: SHIPPED | OUTPATIENT
Start: 2020-07-28 | End: 2020-11-20 | Stop reason: SDUPTHER

## 2020-07-28 RX ORDER — HYDROCHLOROTHIAZIDE 25 MG/1
TABLET ORAL
Qty: 30 TAB | Refills: 2 | Status: SHIPPED | OUTPATIENT
Start: 2020-07-28 | End: 2020-11-02

## 2020-08-20 ENCOUNTER — TELEPHONE (OUTPATIENT)
Dept: INTERNAL MEDICINE CLINIC | Age: 60
End: 2020-08-20

## 2020-08-20 NOTE — TELEPHONE ENCOUNTER
#[phone] pt asking that you call her as soon as possible. She has to go to work at [employer]   Can you call her by then? Pt is also on break tomorrow from 1:30-3:15 as well as now. Pt states she has pain on her side that moves around to her stomach and back. This has gone on for over a week. She states the longer it goes the worse it gets. She states she has a lot of gas and isn't sure if that is it or not? Should pt go to the ED? She stated she just doesn't know what to do.     ibuprofen helps to calm it down some. Please call pt as soon as possible.

## 2020-08-21 NOTE — TELEPHONE ENCOUNTER
Spoke with patient using two identifers. Patient complaining of RLQ pain tender to the touch. Diarrhea after eating, low grade fever. Patient advised to go to the ED. Patient agreed.

## 2020-08-23 ENCOUNTER — APPOINTMENT (OUTPATIENT)
Dept: ULTRASOUND IMAGING | Age: 60
End: 2020-08-23
Attending: EMERGENCY MEDICINE
Payer: COMMERCIAL

## 2020-08-23 ENCOUNTER — APPOINTMENT (OUTPATIENT)
Dept: GENERAL RADIOLOGY | Age: 60
End: 2020-08-23
Attending: EMERGENCY MEDICINE
Payer: COMMERCIAL

## 2020-08-23 ENCOUNTER — HOSPITAL ENCOUNTER (EMERGENCY)
Age: 60
Discharge: HOME | End: 2020-08-23
Attending: EMERGENCY MEDICINE | Admitting: EMERGENCY MEDICINE
Payer: COMMERCIAL

## 2020-08-23 VITALS
HEIGHT: 61 IN | RESPIRATION RATE: 13 BRPM | BODY MASS INDEX: 33.05 KG/M2 | DIASTOLIC BLOOD PRESSURE: 72 MMHG | HEART RATE: 75 BPM | TEMPERATURE: 98.8 F | WEIGHT: 175.04 LBS | SYSTOLIC BLOOD PRESSURE: 124 MMHG | OXYGEN SATURATION: 100 %

## 2020-08-23 DIAGNOSIS — R14.0 ABDOMINAL BLOATING: ICD-10-CM

## 2020-08-23 DIAGNOSIS — R10.84 ABDOMINAL PAIN, GENERALIZED: Primary | ICD-10-CM

## 2020-08-23 LAB
ALBUMIN SERPL-MCNC: 3.5 G/DL (ref 3.5–5)
ALBUMIN/GLOB SERPL: 0.9 {RATIO} (ref 1.1–2.2)
ALP SERPL-CCNC: 94 U/L (ref 45–117)
ALT SERPL-CCNC: 33 U/L (ref 12–78)
ANION GAP SERPL CALC-SCNC: 5 MMOL/L (ref 5–15)
APPEARANCE UR: CLEAR
AST SERPL-CCNC: 17 U/L (ref 15–37)
BACTERIA URNS QL MICRO: NEGATIVE /HPF
BASOPHILS # BLD: 0 K/UL (ref 0–0.1)
BASOPHILS NFR BLD: 1 % (ref 0–1)
BILIRUB SERPL-MCNC: 0.3 MG/DL (ref 0.2–1)
BILIRUB UR QL: NEGATIVE
BUN SERPL-MCNC: 17 MG/DL (ref 6–20)
BUN/CREAT SERPL: 20 (ref 12–20)
CALCIUM SERPL-MCNC: 8.9 MG/DL (ref 8.5–10.1)
CHLORIDE SERPL-SCNC: 105 MMOL/L (ref 97–108)
CO2 SERPL-SCNC: 29 MMOL/L (ref 21–32)
COLOR UR: NORMAL
CREAT SERPL-MCNC: 0.86 MG/DL (ref 0.55–1.02)
DIFFERENTIAL METHOD BLD: NORMAL
EOSINOPHIL # BLD: 0.1 K/UL (ref 0–0.4)
EOSINOPHIL NFR BLD: 2 % (ref 0–7)
EPITH CASTS URNS QL MICRO: NORMAL /LPF
ERYTHROCYTE [DISTWIDTH] IN BLOOD BY AUTOMATED COUNT: 14.1 % (ref 11.5–14.5)
GLOBULIN SER CALC-MCNC: 3.9 G/DL (ref 2–4)
GLUCOSE SERPL-MCNC: 167 MG/DL (ref 65–100)
GLUCOSE UR STRIP.AUTO-MCNC: NEGATIVE MG/DL
HCT VFR BLD AUTO: 35.6 % (ref 35–47)
HGB BLD-MCNC: 11.9 G/DL (ref 11.5–16)
HGB UR QL STRIP: NEGATIVE
HYALINE CASTS URNS QL MICRO: NORMAL /LPF (ref 0–5)
IMM GRANULOCYTES # BLD AUTO: 0 K/UL (ref 0–0.04)
IMM GRANULOCYTES NFR BLD AUTO: 0 % (ref 0–0.5)
KETONES UR QL STRIP.AUTO: NEGATIVE MG/DL
LEUKOCYTE ESTERASE UR QL STRIP.AUTO: NEGATIVE
LIPASE SERPL-CCNC: 128 U/L (ref 73–393)
LYMPHOCYTES # BLD: 3.5 K/UL (ref 0.8–3.5)
LYMPHOCYTES NFR BLD: 40 % (ref 12–49)
MCH RBC QN AUTO: 29 PG (ref 26–34)
MCHC RBC AUTO-ENTMCNC: 33.4 G/DL (ref 30–36.5)
MCV RBC AUTO: 86.8 FL (ref 80–99)
MONOCYTES # BLD: 1 K/UL (ref 0–1)
MONOCYTES NFR BLD: 11 % (ref 5–13)
NEUTS SEG # BLD: 4.1 K/UL (ref 1.8–8)
NEUTS SEG NFR BLD: 46 % (ref 32–75)
NITRITE UR QL STRIP.AUTO: NEGATIVE
NRBC # BLD: 0 K/UL (ref 0–0.01)
NRBC BLD-RTO: 0 PER 100 WBC
PH UR STRIP: 6.5 [PH] (ref 5–8)
PLATELET # BLD AUTO: 241 K/UL (ref 150–400)
PMV BLD AUTO: 10.9 FL (ref 8.9–12.9)
POTASSIUM SERPL-SCNC: 3.3 MMOL/L (ref 3.5–5.1)
PROT SERPL-MCNC: 7.4 G/DL (ref 6.4–8.2)
PROT UR STRIP-MCNC: NEGATIVE MG/DL
RBC # BLD AUTO: 4.1 M/UL (ref 3.8–5.2)
RBC #/AREA URNS HPF: NORMAL /HPF (ref 0–5)
SODIUM SERPL-SCNC: 139 MMOL/L (ref 136–145)
SP GR UR REFRACTOMETRY: 1.01 (ref 1–1.03)
UA: UC IF INDICATED,UAUC: NORMAL
UROBILINOGEN UR QL STRIP.AUTO: 1 EU/DL (ref 0.2–1)
WBC # BLD AUTO: 8.8 K/UL (ref 3.6–11)
WBC URNS QL MICRO: NORMAL /HPF (ref 0–4)

## 2020-08-23 PROCEDURE — 80053 COMPREHEN METABOLIC PANEL: CPT

## 2020-08-23 PROCEDURE — 36415 COLL VENOUS BLD VENIPUNCTURE: CPT

## 2020-08-23 PROCEDURE — 76705 ECHO EXAM OF ABDOMEN: CPT

## 2020-08-23 PROCEDURE — 83690 ASSAY OF LIPASE: CPT

## 2020-08-23 PROCEDURE — 85025 COMPLETE CBC W/AUTO DIFF WBC: CPT

## 2020-08-23 PROCEDURE — 81001 URINALYSIS AUTO W/SCOPE: CPT

## 2020-08-23 PROCEDURE — 99284 EMERGENCY DEPT VISIT MOD MDM: CPT

## 2020-08-23 PROCEDURE — 74011250636 HC RX REV CODE- 250/636: Performed by: EMERGENCY MEDICINE

## 2020-08-23 PROCEDURE — 74018 RADEX ABDOMEN 1 VIEW: CPT

## 2020-08-23 PROCEDURE — 96374 THER/PROPH/DIAG INJ IV PUSH: CPT

## 2020-08-23 RX ORDER — DICYCLOMINE HYDROCHLORIDE 10 MG/1
10 CAPSULE ORAL 4 TIMES DAILY
Qty: 20 CAP | Refills: 0 | Status: SHIPPED | OUTPATIENT
Start: 2020-08-23 | End: 2020-08-28

## 2020-08-23 RX ORDER — KETOROLAC TROMETHAMINE 30 MG/ML
30 INJECTION, SOLUTION INTRAMUSCULAR; INTRAVENOUS
Status: COMPLETED | OUTPATIENT
Start: 2020-08-23 | End: 2020-08-23

## 2020-08-23 RX ORDER — SODIUM CHLORIDE 0.9 % (FLUSH) 0.9 %
5-40 SYRINGE (ML) INJECTION AS NEEDED
Status: DISCONTINUED | OUTPATIENT
Start: 2020-08-23 | End: 2020-08-23 | Stop reason: HOSPADM

## 2020-08-23 RX ORDER — SODIUM CHLORIDE 0.9 % (FLUSH) 0.9 %
5-40 SYRINGE (ML) INJECTION EVERY 8 HOURS
Status: DISCONTINUED | OUTPATIENT
Start: 2020-08-23 | End: 2020-08-23 | Stop reason: HOSPADM

## 2020-08-23 RX ADMIN — KETOROLAC TROMETHAMINE 30 MG: 30 INJECTION, SOLUTION INTRAMUSCULAR; INTRAVENOUS at 15:44

## 2020-08-23 RX ADMIN — SODIUM CHLORIDE 1000 ML: 900 INJECTION, SOLUTION INTRAVENOUS at 15:59

## 2020-08-23 NOTE — LETTER
[address] 
[facility] EMERGENCY DEPT 
[address] [place] [address] Brad Orellana [id number] [phone] Work/School Note Date: 8/23/2020 To Whom It May concern: 
 
Patric Hale was seen and treated today in the emergency room by the following provider(s): 
Attending Provider: Saima Mcghee DO. Patric Hale is excused from work/school on 08/23/20 and 08/24/20. She is medically clear to return to work/school on 8/25/2020.   
 
 
Sincerely, 
 
 
 
 
Marcia Taylor DO

## 2020-08-23 NOTE — DISCHARGE INSTRUCTIONS
Patient Education        Gas and Bloating: Care Instructions  Your Care Instructions     Gas and bloating can be uncomfortable and embarrassing problems. All people pass gas, but some people produce more gas than others, sometimes enough to cause distress. It is normal to pass gas from 6 to 20 times per day. Excess gas usually is not caused by a serious health problem. Gas and bloating usually are caused by something you eat or drink, including some food supplements and medicines. Gas and bloating are usually harmless and go away without treatment. However, changing your diet can help end the problem. Some over-the-counter medicines can help prevent gas and relieve bloating. Follow-up care is a key part of your treatment and safety. Be sure to make and go to all appointments, and call your doctor if you are having problems. It's also a good idea to know your test results and keep a list of the medicines you take. How can you care for yourself at home? · Keep a food diary if you think a food gives you gas. Write down what you eat or drink. Also record when you get gas. If you notice that a food seems to cause your gas each time, avoid it and see if the gas goes away. Examples of foods that cause gas include:  ? Fried and fatty foods. ? Beans. ? Vegetables such as artichokes, asparagus, broccoli, brussels sprouts, cabbage, cauliflower, cucumbers, green peppers, onions, peas, radishes, and raw potatoes. ? Fruits such as apricots, bananas, melons, peaches, pears, prunes, and raw apples. ? Wheat and wheat bran. · Soak dry beans in water overnight, then dump the water and cook the soaked beans in new water. This can help prevent gas and bloating. · If you have problems with lactose, avoid dairy products such as milk and cheese. · Try not to swallow air. Do not drink through a straw, gulp your food, or chew gum. · Take an over-the-counter medicine. Read and follow all instructions on the label. ?  Food enzymes, such as Beano, can be added to gas-producing foods to prevent gas. ? Antacids, such as Maalox Anti-Gas and Mylanta Gas, can relieve bloating by making you burp. Be careful when you take over-the-counter antacid medicines. Many of these medicines have aspirin in them. Read the label to make sure that you are not taking more than the recommended dose. Too much aspirin can be harmful. ? Activated charcoal tablets, such as CharcoCaps, may decrease odor from gas you pass. ? If you have problems with lactose, you can take medicines such as Dairy Ease and Lactaid with dairy products to prevent gas and bloating. · Get some exercise regularly. When should you call for help? [id number] anytime you think you may need emergency care. For example, call if:  · You have gas and signs of a heart attack, such as:  ? Chest pain or pressure. ? Sweating. ? Shortness of breath. ? Nausea or vomiting. ? Pain that spreads from the chest to the neck, jaw, or one or both shoulders or arms. ? Dizziness or lightheadedness. ? A fast or uneven pulse. After calling 911, chew 1 adult-strength aspirin. Wait for an ambulance. Do not try to drive yourself. Call your doctor now or seek immediate medical care if:  · You have severe belly pain. · You have blood in your stool. Watch closely for changes in your health, and be sure to contact your doctor if:  · You have blood or pus in your urine. · Your urine is cloudy or smells bad. · You are burping and have trouble swallowing. · You feel bloated and have swelling in your belly. · You do not get better as expected. Where can you learn more? Go to http://delfino-beatrice.info/  Enter [id number] in the search box to learn more about \"Gas and Bloating: Care Instructions. \"  Current as of: June 26, 2019               Content Version: 12.5  © 2612-6132 [employer].    Care instructions adapted under license by [employer] (which disclaims liability or warranty for this information). If you have questions about a medical condition or this instruction, always ask your healthcare professional. [address] any warranty or liability for your use of this information. Patient Education        Abdominal Pain: Care Instructions  Your Care Instructions     Abdominal pain has many possible causes. Some aren't serious and get better on their own in a few days. Others need more testing and treatment. If your pain continues or gets worse, you need to be rechecked and may need more tests to find out what is wrong. You may need surgery to correct the problem. Don't ignore new symptoms, such as fever, nausea and vomiting, urination problems, pain that gets worse, and dizziness. These may be signs of a more serious problem. Your doctor may have recommended a follow-up visit in the next 8 to 12 hours. If you are not getting better, you may need more tests or treatment. The doctor has checked you carefully, but problems can develop later. If you notice any problems or new symptoms, get medical treatment right away. Follow-up care is a key part of your treatment and safety. Be sure to make and go to all appointments, and call your doctor if you are having problems. It's also a good idea to know your test results and keep a list of the medicines you take. How can you care for yourself at home? · Rest until you feel better. · To prevent dehydration, drink plenty of fluids, enough so that your urine is light yellow or clear like water. Choose water and other caffeine-free clear liquids until you feel better. If you have kidney, heart, or liver disease and have to limit fluids, talk with your doctor before you increase the amount of fluids you drink. · If your stomach is upset, eat mild foods, such as rice, dry toast or crackers, bananas, and applesauce. Try eating several small meals instead of two or three large ones.   · Wait until 48 hours after all symptoms have gone away before you have spicy foods, alcohol, and drinks that contain caffeine. · Do not eat foods that are high in fat. · Avoid anti-inflammatory medicines such as aspirin, ibuprofen (Advil, Motrin), and naproxen (Aleve). These can cause stomach upset. Talk to your doctor if you take daily aspirin for another health problem. When should you call for help? [id number] anytime you think you may need emergency care. For example, call if:  · You passed out (lost consciousness). · You pass maroon or very bloody stools. · You vomit blood or what looks like coffee grounds. · You have new, severe belly pain. Call your doctor now or seek immediate medical care if:  · Your pain gets worse, especially if it becomes focused in one area of your belly. · You have a new or higher fever. · Your stools are black and look like tar, or they have streaks of blood. · You have unexpected vaginal bleeding. · You have symptoms of a urinary tract infection. These may include:  ? Pain when you urinate. ? Urinating more often than usual.  ? Blood in your urine. · You are dizzy or lightheaded, or you feel like you may faint. Watch closely for changes in your health, and be sure to contact your doctor if:  · You are not getting better after 1 day (24 hours). Where can you learn more? Go to http://www.gray.com/  Enter W618 in the search box to learn more about \"Abdominal Pain: Care Instructions. \"  Current as of: June 26, 2019               Content Version: 12.5  © 4291-6678 [employer]. Care instructions adapted under license by [employer] (which disclaims liability or warranty for this information). If you have questions about a medical condition or this instruction, always ask your healthcare professional. [place] [address] any warranty or liability for your use of this information.        PHAYZYME OVER THE COUNTER FOR GAS PAIN '

## 2020-08-25 ENCOUNTER — VIRTUAL VISIT (OUTPATIENT)
Dept: INTERNAL MEDICINE CLINIC | Age: 60
End: 2020-08-25
Payer: COMMERCIAL

## 2020-08-25 DIAGNOSIS — K58.9 IRRITABLE BOWEL SYNDROME, UNSPECIFIED TYPE: Primary | ICD-10-CM

## 2020-08-25 PROCEDURE — 99442 PR PHYS/QHP TELEPHONE EVALUATION 11-20 MIN: CPT | Performed by: INTERNAL MEDICINE

## 2020-08-25 RX ORDER — BRIMONIDINE TARTRATE 2 MG/ML
SOLUTION/ DROPS OPHTHALMIC
COMMUNITY
Start: 2020-05-24

## 2020-08-25 NOTE — PROGRESS NOTES
HISTORY OF PRESENT ILLNESS  Kem Price is a 64 y.o. female. HPI     VV via telephone encounter d/t covid 19 pandemic x 15 minutes    Hx dm-2 htn hld tension headaches  ER 2 d ago for abdominal pain x1-2 weeks--US , labs negative    Was given med for gas which caused diarrhea  felt weak yesterday   Some side pain on right side  Colonoscopy was recommended by ER MD-seeing Dr Dinorah Rich in 2 months  Not eating much but drinking fluids  Has gas type pain  Has some watery stools today  Bentyl was prescribed by ER MD --taking qid  Overall feesl better todayu  Patient Active Problem List    Diagnosis Date Noted    Peripheral neuropathy 01/30/2012    Bronchitis 06/19/2009    Diabetes mellitus ([address] 06/19/2009    Diabetic gastroparesis ([address] 06/19/2009    HTN (hypertension) 06/19/2009    Hypercholesterolemia 06/19/2009    Osteoarthritis 06/19/2009     Current Outpatient Medications   Medication Sig Dispense Refill    dicyclomine (BentyL) 10 mg capsule Take 1 Cap by mouth four (4) times daily for 5 days. 20 Cap 0    rosuvastatin (CRESTOR) 20 mg tablet TAKE 1 TABLET BY MOUTH EVERY DAY 30 Tab 2    losartan (COZAAR) 100 mg tablet TAKE 1 TABLET BY MOUTH EVERY DAY 30 Tab 2    hydroCHLOROthiazide (HYDRODIURIL) 25 mg tablet TAKE 1 TAB BY MOUTH DAILY. 90 30 Tab 2    butalbital-acetaminophen-caffeine (FIORICET, ESGIC) -40 mg per tablet TAKE 1 TABLET EVERY 4 HOURS AS NEEDED 30 Tab 0    aspirin delayed-release 81 mg tablet TAKE 1 TAB BY MOUTH DAILY. 90 Tab 3    metFORMIN (GLUCOPHAGE) 1,000 mg tablet TAKE 1 TABLET BY MOUTH ONCE DAILY 90 Tab 3    ondansetron (ZOFRAN ODT) 4 mg disintegrating tablet Take 1 Tab by mouth every eight (8) hours as needed for Nausea.  20 Tab 0    Blood-Glucose Meter monitoring kit Check fsbs every day 250.02 1 Kit 0    glucose blood VI test strips (ASCENSIA AUTODISC VI, ONE TOUCH ULTRA TEST VI) strip Check fsbs every day 250.02 100 Strip 5    lancets misc Check fsbs every day 250.02 1 Each 11    ibuprofen (MOTRIN) 600 mg tablet TAKE 1 TAB BY MOUTH EVERY EIGHT (8) HOURS AS NEEDED FOR PAIN. 60 Tab 1    losartan (COZAAR) 50 mg tablet TAKE 1 TABLET BY MOUTH ONCE DAILY 90 Tab 3    glucose blood VI test strips (ASCENSIA AUTODISC VI, ONE TOUCH ULTRA TEST VI) strip One Touch Ultra 2 meter Check blood sugars daily. ICD-10 E11.620 50 Strip 11    hydrocortisone (HYTONE) 2.5 % topical cream Apply  to affected area two (2) times a day. use thin layer 30 g 0    Lancets misc One Touch Ultra 2 meter. Check blood sugars daily. ICD-10 E11.620 100 Each 11    timolol (TIMOPTIC) 0.5 % ophthalmic solution Administer 1 Drop to both eyes daily. 10 mL 4     No Known Allergies   Lab Results   Component Value Date/Time    WBC 8.8 08/23/2020 03:50 PM    HGB 11.9 08/23/2020 03:50 PM    HCT 35.6 08/23/2020 03:50 PM    PLATELET 272 29/75/9358 03:50 PM    MCV 86.8 08/23/2020 03:50 PM     Lab Results   Component Value Date/Time    GFR est non-AA >60 08/23/2020 03:50 PM    GFR est AA >60 08/23/2020 03:50 PM    Creatinine 0.86 08/23/2020 03:50 PM    BUN 17 08/23/2020 03:50 PM    Sodium 139 08/23/2020 03:50 PM    Potassium 3.3 (L) 08/23/2020 03:50 PM    Chloride 105 08/23/2020 03:50 PM    CO2 29 08/23/2020 03:50 PM        ROS    Physical Exam    ASSESSMENT and PLAN  Diagnoses and all orders for this visit:    1.  Irritable bowel syndrome, unspecified type   Bentyl and simethcone prn   Pt will get colonoscopy in early November   Consider CT if not improving-discussed   Small meals recommedned   Doubt d/t metfomrin but could change to X R if symptoms continue

## 2020-08-25 NOTE — PATIENT INSTRUCTIONS
This is an established visit conducted via telemedicine. The patient has been instructed that this meets HIPAA criteria and acknowledges and agrees to this method of visitation. Eddye Gilroy, [place] 
29[birth year] 
1:04 PM 
Chief Complaint Patient presents with  Follow-up 8/23/20 ED visit stomach pain

## 2020-08-30 NOTE — ED PROVIDER NOTES
EMERGENCY DEPARTMENT HISTORY AND PHYSICAL EXAM      Date: 8/23/2020  Patient Name: Jhoana Castellano    History of Presenting Illness     Chief Complaint   Patient presents with    Nausea     Ambulatory into the ED with c/o urinary frequency, nausea, and Rt side pain radiating to Rt lower abdomen. Symptoms vary in intensity x 2 weeks.  Abdominal Pain       History Provided By: Patient    HPI: Jhoana Castellano, 64 y.o. female presents to the ED with cc of abd pain, nausea. Pt states symptoms have been on going for 2 weeks. She states abd pain primarily RUQ and right sided. Pain currently 3/10, dull ache. The intermittently worsens but has not been associated with any one thing. There are no alleviating factors. There has been an increase bloating and gas. There has been nausea but no vomiting, diarrhea, or constipation. She denies melena, hematochezia, BRBPR. There has been urinary frequency but no urgency or pain with urination. She denies CP or SOA. She denies fever or chills. There has been no vag dc or vag bleeding. There are no other complaints, changes, or physical findings at this time. PCP: Kylie Edmondson MD    No current facility-administered medications on file prior to encounter. Current Outpatient Medications on File Prior to Encounter   Medication Sig Dispense Refill    rosuvastatin (CRESTOR) 20 mg tablet TAKE 1 TABLET BY MOUTH EVERY DAY 30 Tab 2    losartan (COZAAR) 100 mg tablet TAKE 1 TABLET BY MOUTH EVERY DAY 30 Tab 2    hydroCHLOROthiazide (HYDRODIURIL) 25 mg tablet TAKE 1 TAB BY MOUTH DAILY. 90 30 Tab 2    butalbital-acetaminophen-caffeine (FIORICET, ESGIC) -40 mg per tablet TAKE 1 TABLET EVERY 4 HOURS AS NEEDED 30 Tab 0    aspirin delayed-release 81 mg tablet TAKE 1 TAB BY MOUTH DAILY.  90 Tab 3    metFORMIN (GLUCOPHAGE) 1,000 mg tablet TAKE 1 TABLET BY MOUTH ONCE DAILY 90 Tab 3    ondansetron (ZOFRAN ODT) 4 mg disintegrating tablet Take 1 Tab by mouth every eight (8) hours as needed for Nausea. 20 Tab 0    Blood-Glucose Meter monitoring kit Check fsbs every day 250.02 1 Kit 0    glucose blood VI test strips (ASCENSIA AUTODISC VI, ONE TOUCH ULTRA TEST VI) strip Check fsbs every day 250.02 100 Strip 5    lancets misc Check fsbs every day 250.02 1 Each 11    ibuprofen (MOTRIN) 600 mg tablet TAKE 1 TAB BY MOUTH EVERY EIGHT (8) HOURS AS NEEDED FOR PAIN. 60 Tab 1    glucose blood VI test strips (ASCENSIA AUTODISC VI, ONE TOUCH ULTRA TEST VI) strip One Touch Ultra 2 meter Check blood sugars daily. ICD-10 E11.620 50 Strip 11    hydrocortisone (HYTONE) 2.5 % topical cream Apply  to affected area two (2) times a day. use thin layer 30 g 0    Lancets misc One Touch Ultra 2 meter. Check blood sugars daily. ICD-10 E11.620 100 Each 11    timolol (TIMOPTIC) 0.5 % ophthalmic solution Administer 1 Drop to both eyes daily. 10 mL 4       Past History     Past Medical History:  Past Medical History:   Diagnosis Date    Chronic pain     Diabetes mellitus ([facility] 6/19/2009    Diverticulosis 2009    colonoscopy . Dr. Jacqui Nair    GERD (gastroesophageal reflux disease)     Headache     HTN (hypertension) 6/19/2009    Hypercholesterolemia 6/19/2009    Osteoarthritis 6/19/2009    Osteoporosis        Past Surgical History:  No past surgical history on file. Family History:  Family History   Problem Relation Age of Onset    Cancer Other         lung    Diabetes Other     Thyroid Disease Other     Hypertension Mother     Diabetes Mother     Hypertension Father     Diabetes Father     Cancer Father         Lung Cancer       Social History:  Social History     Tobacco Use    Smoking status: Never Smoker    Smokeless tobacco: Never Used   Substance Use Topics    Alcohol use: No    Drug use: Yes     Types: Prescription, OTC       Allergies:  No Known Allergies      Review of Systems   Review of Systems   Constitutional: Negative.   Negative for appetite change, chills, fatigue and fever.   HENT: Negative. Negative for congestion, rhinorrhea, sinus pressure and sore throat. Eyes: Negative. Respiratory: Negative. Negative for cough, choking, chest tightness, shortness of breath and wheezing. Cardiovascular: Negative. Negative for chest pain, palpitations and leg swelling. Gastrointestinal: Positive for abdominal pain and nausea. Negative for constipation, diarrhea and vomiting. Endocrine: Negative. Genitourinary: Positive for frequency. Negative for difficulty urinating, dysuria, flank pain and urgency. Musculoskeletal: Negative. Skin: Negative. Neurological: Negative. Negative for dizziness, speech difficulty, weakness, light-headedness, numbness and headaches. Psychiatric/Behavioral: Negative. All other systems reviewed and are negative. Physical Exam   Physical Exam  Vitals signs and nursing note reviewed. Constitutional:       General: She is not in acute distress. Appearance: She is well-developed. She is obese. She is not diaphoretic. HENT:      Head: Normocephalic and atraumatic. Mouth/Throat:      Pharynx: No oropharyngeal exudate. Eyes:      Conjunctiva/sclera: Conjunctivae normal.      Pupils: Pupils are equal, round, and reactive to light. Neck:      Musculoskeletal: Normal range of motion and neck supple. Vascular: No JVD. Trachea: No tracheal deviation. Cardiovascular:      Rate and Rhythm: Normal rate and regular rhythm. Heart sounds: Normal heart sounds. No murmur. Pulmonary:      Effort: Pulmonary effort is normal. No respiratory distress. Breath sounds: Normal breath sounds. No stridor. No wheezing or rales. Chest:      Chest wall: No tenderness. Abdominal:      General: Bowel sounds are normal. There is no distension. Palpations: Abdomen is soft. Tenderness: There is abdominal tenderness in the right upper quadrant. There is no guarding or rebound.    Musculoskeletal: Normal range of motion. General: No tenderness. Skin:     General: Skin is warm and dry. Neurological:      Mental Status: She is alert and oriented to person, place, and time. Cranial Nerves: No cranial nerve deficit. Comments: No gross motor or sensory deficits    Psychiatric:         Behavior: Behavior normal.         Diagnostic Study Results     Labs -  URINALYSIS W/ REFLEX CULTURE (Final result)    Component (Lab Inquiry)   Collection Time  Result Time  COLOR  APPRN  REFSG  TANNER  PROTU    08/23/20 15:50:00  08/23/20 16:01:36  YELLOW/STRAW   Color Reference Range:. .. CLEAR  1.013  6.5  Negative         Collection Time  Result Time  GLUCU  KETU  BILU  BLDU  UROU    08/23/20 15:50:00  08/23/20 16:01:36  Negative  Negative  Negative  Negative  1.0         Collection Time  Result Time  DANIELLA  LEUKU  WBCU  RBCU  EPSU    08/23/20 15:50:00  08/23/20 16:01:36  Negative  Negative  0-4  0-5  FEW   Epithelial cell catego. ..         Collection Time  Result Time  Terri Colon  HYCST    08/23/20 15:50:00  08/23/20 16:01:36  Negative  CULTURE NOT INDICATED BY UA RESULT  0-2           Final result                             CBC WITH AUTOMATED DIFF (Final result)    Component (Lab Inquiry)   Collection Time  Result Time  WBC  RBC  HGB  HCT  MCV    08/23/20 15:50:00  08/23/20 15:56:49  8.8  4.10  11.9  35.6  86.8         Collection Time  Result Time  MCH  MCHC  RDW  PLT  MPLV    08/23/20 15:50:00  08/23/20 15:56:49  29.0  33.4  14.1  241  10.9         Collection Time  Result Time  NRBC  ANRBC  GRANS  LYMPH  MONOS    08/23/20 15:50:00  08/23/20 15:56:49  0.0  0.00  46  40  11         Collection Time  Result Time  EOS  BASOS  IG  ABG  ABL    08/23/20 15:50:00  08/23/20 15:56:49  2  1  0  4.1  3.5         Collection Time  Result Time  ABM  AIDAN  ABB  AIG  DF    08/23/20 15:50:00  08/23/20 15:56:49  1.0  0.1  0.0  0.0  AUTOMATED           Final result                             Contains abnormal data  METABOLIC PANEL, COMPREHENSIVE (Final result)    Component (Lab Inquiry)   Collection Time  Result Time  NA  K  CL  CO2  AGAP    08/23/20 15:50:00  08/23/20 16:22:56  139  3.3Low    105  29  5         Collection Time  Result Time  GLU  BUN  CREA  BUCR  GFRAA    08/23/20 15:50:00  08/23/20 16:22:56  167High    17  0.86  20  >60         Collection Time  Result Time  GFRNA  CA  TBIL  GPT  SGOT    08/23/20 15:50:00  08/23/20 16:22:56  >60   Estimated GFR is calcu. ..  8.9  0.3  33  17         Collection Time  Result Time  AP  TP  ALB  GLOB  AGRAT    08/23/20 15:50:00  08/23/20 16:22:56  94  7.4  3.5  3.9  0.9Low           Final result                           LIPASE (Final result)    Component (Lab Inquiry)   Collection Time  Result Time  LPSE    08/23/20 15:50:00  08/23/20 16:22:56  128           Final result                          Radiologic Studies -   US ABD LTD   Final Result   IMPRESSION:    No acute process. XR ABD (KUB)   Final Result   IMPRESSION:   Nonobstructive bowel gas pattern. CT Results  (Last 48 hours)    None        CXR Results  (Last 48 hours)    None          Medical Decision Making   I am the first provider for this patient. I reviewed the vital signs, available nursing notes, past medical history, past surgical history, family history and social history. Vital Signs-Reviewed the patient's vital signs. Records Reviewed: Nursing Notes, Old Medical Records, Previous Radiology Studies and Previous Laboratory Studies    Provider Notes (Medical Decision Making):   DDx- Abdominal bloating, gas, constipation, Biliary colic, cholecystitis, pancreatitis, UTI    ED Course:   Initial assessment performed. The patients presenting problems have been discussed, and they are in agreement with the care plan formulated and outlined with them. I have encouraged them to ask questions as they arise throughout their visit. Symptoms improved during ED stay.  Discussed with pt if symptoms persists she may need HIDA scan to evaluate gall bladder. Disposition:  DC home, symptom management     DISCHARGE PLAN:  1. Discharge Medication List as of 8/23/2020  5:35 PM      START taking these medications    Details   dicyclomine (BentyL) 10 mg capsule Take 1 Cap by mouth four (4) times daily for 5 days. , Normal, Disp-20 Cap,R-0         CONTINUE these medications which have NOT CHANGED    Details   rosuvastatin (CRESTOR) 20 mg tablet TAKE 1 TABLET BY MOUTH EVERY DAY, Normal, Disp-30 Tab,R-2      !! losartan (COZAAR) 100 mg tablet TAKE 1 TABLET BY MOUTH EVERY DAY, Normal, Disp-30 Tab,R-2      hydroCHLOROthiazide (HYDRODIURIL) 25 mg tablet TAKE 1 TAB BY MOUTH DAILY. 90, Normal, Disp-30 Tab,R-2      butalbital-acetaminophen-caffeine (FIORICET, ESGIC) -40 mg per tablet TAKE 1 TABLET EVERY 4 HOURS AS NEEDED, Normal, Disp-30 Tab,R-0      aspirin delayed-release 81 mg tablet TAKE 1 TAB BY MOUTH DAILY., Normal, Disp-90 Tab, R-3      metFORMIN (GLUCOPHAGE) 1,000 mg tablet TAKE 1 TABLET BY MOUTH ONCE DAILY, Normal, Disp-90 Tab, R-3      ondansetron (ZOFRAN ODT) 4 mg disintegrating tablet Take 1 Tab by mouth every eight (8) hours as needed for Nausea., Normal, Disp-20 Tab, R-0      Blood-Glucose Meter monitoring kit Check fsbs every day 250.02, Normal, Disp-1 Kit, R-0      !! glucose blood VI test strips (ASCENSIA AUTODISC VI, ONE TOUCH ULTRA TEST VI) strip Check fsbs every day 250.02, Normal, Disp-100 Strip, R-5      !! lancets misc Check fsbs every day 250.02, Normal, Disp-1 Each, R-11      ibuprofen (MOTRIN) 600 mg tablet TAKE 1 TAB BY MOUTH EVERY EIGHT (8) HOURS AS NEEDED FOR PAIN., Normal, Disp-60 Tab, R-1      !! losartan (COZAAR) 50 mg tablet TAKE 1 TABLET BY MOUTH ONCE DAILY, Normal, Disp-90 Tab, R-3      !! glucose blood VI test strips (ASCENSIA AUTODISC VI, ONE TOUCH ULTRA TEST VI) strip One Touch Ultra 2 meter Check blood sugars daily.  ICD-10 E11.620, Normal, Disp-50 Strip, R-11      hydrocortisone (HYTONE) 2.5 % topical cream Apply  to affected area two (2) times a day. use thin layer, Normal, Disp-30 g, R-0      !! Lancets misc One Touch Ultra 2 meter. Check blood sugars daily. ICD-10 E11.620, Normal, Disp-100 Each, R-11      timolol (TIMOPTIC) 0.5 % ophthalmic solution Administer 1 Drop to both eyes daily. , Normal, Disp-10 mL, R-4       !! - Potential duplicate medications found. Please discuss with provider. 2.   Follow-up Information     Follow up With Specialties Details Why Contact Info    Pa Simental MD Internal Medicine  As needed [address] [facility]  [phone]          3. Return to ED if worse     Diagnosis     Clinical Impression:   1. Abdominal pain, generalized    2. Abdominal bloating        Attestations:    Hipolito Ayala, DO    Please note that this dictation was completed with [employer], the computer voice recognition software. Quite often unanticipated grammatical, syntax, homophones, and other interpretive errors are inadvertently transcribed by the computer software. Please disregard these errors. Please excuse any errors that have escaped final proofreading. Thank you.

## 2020-09-01 RX ORDER — IBUPROFEN 600 MG/1
TABLET ORAL
Qty: 60 TAB | Refills: 1 | Status: SHIPPED | OUTPATIENT
Start: 2020-09-01 | End: 2020-11-03 | Stop reason: SDUPTHER

## 2020-09-01 NOTE — TELEPHONE ENCOUNTER
Pt states that OTC is not working for her pain (states Dr. Tera Granger knows) and that she needs this medication to help the pain. Pt is asking that you call this in as soon as possible. If there are any problems/questions please call pt as soon as you can. Thanks.

## 2020-09-14 NOTE — TELEPHONE ENCOUNTER
#[phone]  Pt states that she needs a letter to be faxed on 9-15-20 to opt out of flu shot being given at work. This needs to go to the Attn: [employer] [address]  Fax #[phone] and if this doesn't work try #[phone]     Pt went to the ED two weeks ago for her side, back and stomach pain. She states once the pain medication wears off these areas continue to bother her. Pt doesn't know if this is medication she is taking making this happen or what. She had labs, US and Xray done while in ED. Pt states she has a colonoscopy scheduled for 11-10-20. Do you want her to have this done sooner? Please call pt on this tomorrow, 9-15-20 in the AM please.

## 2020-09-15 NOTE — TELEPHONE ENCOUNTER
Kylie Edmondson MD  You 15 hours ago (4:59 PM)      She has no medical reason to prevent her from getting the flu shot. If she opts out that is her choice--not sure why we need to send a letter. If she has fever or severe pain then she needs to get a CT scan of abdomen or go back to ED,otherwise she can get the colonsocopy in [place] text      Pt notified.

## 2020-09-30 ENCOUNTER — TELEPHONE (OUTPATIENT)
Dept: INTERNAL MEDICINE CLINIC | Age: 60
End: 2020-09-30

## 2020-09-30 NOTE — TELEPHONE ENCOUNTER
Janelle Bowman. [place] [address] [address]               Caller's first and last name: Doroteo Peres   Reason for call: Requesting call back to schedule a flu shot and possible shingles shot.  Pt would like to know what the side effects of flu shot may be. Callback required yes/no and why: yes   Best contact number(s): [phone]   Details to clarify the request: Pt also would like to find out as a diabetic if she can take nickel magnesium for leg cramps.

## 2020-10-01 NOTE — TELEPHONE ENCOUNTER
Nurse visit scheduled for 10/6 @ 12 for flu and shingles vaccine. Pt verbalized understanding of information discussed w/ no further questions at this time.

## 2020-10-13 ENCOUNTER — VIRTUAL VISIT (OUTPATIENT)
Dept: ENDOCRINOLOGY | Age: 60
End: 2020-10-13

## 2020-10-22 ENCOUNTER — TELEPHONE (OUTPATIENT)
Dept: INTERNAL MEDICINE CLINIC | Age: 60
End: 2020-10-22

## 2020-10-22 NOTE — TELEPHONE ENCOUNTER
General Message/Vendor Calls     Caller's first and last name: Johnny Hudson (last name unknown) with [facility] member services. Reason for call: Pt needs an order for a colonoscopy.        Callback required yes/no and why: yes       Best contact number(s): [phone]       Details to clarify the request: [phone] (member services ph. Number)       Darwin Luu

## 2020-11-02 RX ORDER — HYDROCHLOROTHIAZIDE 25 MG/1
TABLET ORAL
Qty: 30 TAB | Refills: 2 | Status: SHIPPED | OUTPATIENT
Start: 2020-11-02 | End: 2020-11-19 | Stop reason: SDUPTHER

## 2020-11-03 RX ORDER — ASPIRIN 81 MG/1
81 TABLET ORAL DAILY
Qty: 90 TAB | Refills: 3 | Status: SHIPPED | OUTPATIENT
Start: 2020-11-03

## 2020-11-03 RX ORDER — BUTALBITAL, ACETAMINOPHEN AND CAFFEINE 50; 325; 40 MG/1; MG/1; MG/1
TABLET ORAL
Qty: 30 TAB | Refills: 0 | Status: SHIPPED | OUTPATIENT
Start: 2020-11-03 | End: 2020-11-20 | Stop reason: SDUPTHER

## 2020-11-03 RX ORDER — ROSUVASTATIN CALCIUM 20 MG/1
TABLET, COATED ORAL
Qty: 90 TAB | Refills: 2 | Status: SHIPPED | OUTPATIENT
Start: 2020-11-03 | End: 2020-11-19 | Stop reason: SDUPTHER

## 2020-11-03 RX ORDER — IBUPROFEN 600 MG/1
TABLET ORAL
Qty: 60 TAB | Refills: 1 | Status: SHIPPED | OUTPATIENT
Start: 2020-11-03 | End: 2021-12-14

## 2020-11-03 NOTE — TELEPHONE ENCOUNTER
----- Message from Milagros Harrington sent at 11/3/2020 10:20 AM EST -----  Regarding: Dr. Mikel Daugherty (if not patient): pt  Relationship of caller (if not patient): pt  Best contact number(s): [phone]  Name of medication and dosage if known: \"rosuvastatin\" 20mg \"  \"butalb acetamin\" 50/325/40 mg \"enteric low dose Asprin\" \"ibuprophen\" 600 mg  Is patient out of this medication (yes/no): yes  Pharmacy name: [id number] listed in chart? (yes/no): yes  Pharmacy phone number: [phone]  Date of last visit: 8/25/20  Details to clarify the request: [facility] also sent over a request.    Message from Yemi Paulino

## 2020-11-06 ENCOUNTER — HOSPITAL ENCOUNTER (OUTPATIENT)
Dept: PREADMISSION TESTING | Age: 60
Discharge: HOME | End: 2020-11-06
Payer: COMMERCIAL

## 2020-11-06 PROCEDURE — 87635 SARS-COV-2 COVID-19 AMP PRB: CPT

## 2020-11-07 LAB
HEALTH STATUS, XMCV2T: NORMAL
SARS-COV-2, COV2NT: NOT DETECTED
SOURCE, COVRS: NORMAL
SPECIMEN SOURCE, FCOV2M: NORMAL
SPECIMEN TYPE, XMCV1T: NORMAL

## 2020-11-09 ENCOUNTER — TELEPHONE (OUTPATIENT)
Dept: INTERNAL MEDICINE CLINIC | Age: 60
End: 2020-11-09

## 2020-11-09 NOTE — TELEPHONE ENCOUNTER
----- Message from Valeria Solano sent at 11/9/2020  1:20 PM EST -----  Regarding: /Telephone  General Message/Vendor Calls    Caller's first and last name: Pt      Reason for call: Pt wants to know if she is able to take her Metformin medication today and tomorrow. Callback required yes/no and why: yes      Best contact number(s): [phone]      Details to clarify the request: Pt has a colonoscopy scheduled for tomorrow at 8am and wanted to know if she needs to take her diabetic medication. Pt called this morning.        Message from [facility]

## 2020-11-09 NOTE — TELEPHONE ENCOUNTER
Fletcher Haywood MD  You 49 minutes ago (12:45 PM)      She can hold it today and tomorrow morning    Message text      Pt notified.

## 2020-11-09 NOTE — TELEPHONE ENCOUNTER
#[phone]   Pt is having a colonoscopy on 11-10-20 and pt needs to know if she takes her metformin today and tomorrow? Please call so she knows what to do very soon. Thanks.

## 2020-11-10 ENCOUNTER — HOSPITAL ENCOUNTER (OUTPATIENT)
Age: 60
Setting detail: OUTPATIENT SURGERY
Discharge: HOME | End: 2020-11-10
Attending: INTERNAL MEDICINE | Admitting: INTERNAL MEDICINE
Payer: COMMERCIAL

## 2020-11-10 ENCOUNTER — ANESTHESIA (OUTPATIENT)
Dept: ENDOSCOPY | Age: 60
End: 2020-11-10
Payer: COMMERCIAL

## 2020-11-10 ENCOUNTER — ANESTHESIA EVENT (OUTPATIENT)
Dept: ENDOSCOPY | Age: 60
End: 2020-11-10
Payer: COMMERCIAL

## 2020-11-10 VITALS
SYSTOLIC BLOOD PRESSURE: 137 MMHG | WEIGHT: 177 LBS | BODY MASS INDEX: 33.42 KG/M2 | DIASTOLIC BLOOD PRESSURE: 72 MMHG | OXYGEN SATURATION: 100 % | HEART RATE: 54 BPM | RESPIRATION RATE: 10 BRPM | HEIGHT: 61 IN | TEMPERATURE: 97.8 F

## 2020-11-10 LAB — COLONOSCOPY, EXTERNAL: NORMAL

## 2020-11-10 PROCEDURE — 2709999900 HC NON-CHARGEABLE SUPPLY: Performed by: INTERNAL MEDICINE

## 2020-11-10 PROCEDURE — 74011250636 HC RX REV CODE- 250/636: Performed by: REGISTERED NURSE

## 2020-11-10 PROCEDURE — 76040000019: Performed by: INTERNAL MEDICINE

## 2020-11-10 PROCEDURE — 76060000031 HC ANESTHESIA FIRST 0.5 HR: Performed by: INTERNAL MEDICINE

## 2020-11-10 PROCEDURE — 74011000250 HC RX REV CODE- 250: Performed by: REGISTERED NURSE

## 2020-11-10 RX ORDER — SODIUM CHLORIDE 9 MG/ML
INJECTION, SOLUTION INTRAVENOUS
Status: DISCONTINUED | OUTPATIENT
Start: 2020-11-10 | End: 2020-11-10 | Stop reason: HOSPADM

## 2020-11-10 RX ORDER — SODIUM CHLORIDE 0.9 % (FLUSH) 0.9 %
5-40 SYRINGE (ML) INJECTION EVERY 8 HOURS
Status: DISCONTINUED | OUTPATIENT
Start: 2020-11-10 | End: 2020-11-10 | Stop reason: HOSPADM

## 2020-11-10 RX ORDER — NALOXONE HYDROCHLORIDE 0.4 MG/ML
0.4 INJECTION, SOLUTION INTRAMUSCULAR; INTRAVENOUS; SUBCUTANEOUS
Status: DISCONTINUED | OUTPATIENT
Start: 2020-11-10 | End: 2020-11-10 | Stop reason: HOSPADM

## 2020-11-10 RX ORDER — ATROPINE SULFATE 0.1 MG/ML
0.5 INJECTION INTRAVENOUS
Status: DISCONTINUED | OUTPATIENT
Start: 2020-11-10 | End: 2020-11-10 | Stop reason: HOSPADM

## 2020-11-10 RX ORDER — SODIUM CHLORIDE 9 MG/ML
75 INJECTION, SOLUTION INTRAVENOUS CONTINUOUS
Status: DISCONTINUED | OUTPATIENT
Start: 2020-11-10 | End: 2020-11-10 | Stop reason: HOSPADM

## 2020-11-10 RX ORDER — DEXTROMETHORPHAN/PSEUDOEPHED 2.5-7.5/.8
1.2 DROPS ORAL
Status: DISCONTINUED | OUTPATIENT
Start: 2020-11-10 | End: 2020-11-10 | Stop reason: HOSPADM

## 2020-11-10 RX ORDER — MIDAZOLAM HYDROCHLORIDE 1 MG/ML
.25-5 INJECTION, SOLUTION INTRAMUSCULAR; INTRAVENOUS
Status: DISCONTINUED | OUTPATIENT
Start: 2020-11-10 | End: 2020-11-10 | Stop reason: HOSPADM

## 2020-11-10 RX ORDER — SODIUM CHLORIDE 0.9 % (FLUSH) 0.9 %
5-40 SYRINGE (ML) INJECTION AS NEEDED
Status: DISCONTINUED | OUTPATIENT
Start: 2020-11-10 | End: 2020-11-10 | Stop reason: HOSPADM

## 2020-11-10 RX ORDER — FLUMAZENIL 0.1 MG/ML
0.2 INJECTION INTRAVENOUS
Status: DISCONTINUED | OUTPATIENT
Start: 2020-11-10 | End: 2020-11-10 | Stop reason: HOSPADM

## 2020-11-10 RX ORDER — LIDOCAINE HYDROCHLORIDE 20 MG/ML
INJECTION, SOLUTION EPIDURAL; INFILTRATION; INTRACAUDAL; PERINEURAL AS NEEDED
Status: DISCONTINUED | OUTPATIENT
Start: 2020-11-10 | End: 2020-11-10 | Stop reason: HOSPADM

## 2020-11-10 RX ORDER — PROPOFOL 10 MG/ML
INJECTION, EMULSION INTRAVENOUS AS NEEDED
Status: DISCONTINUED | OUTPATIENT
Start: 2020-11-10 | End: 2020-11-10 | Stop reason: HOSPADM

## 2020-11-10 RX ORDER — EPINEPHRINE 0.1 MG/ML
1 INJECTION INTRACARDIAC; INTRAVENOUS
Status: DISCONTINUED | OUTPATIENT
Start: 2020-11-10 | End: 2020-11-10 | Stop reason: HOSPADM

## 2020-11-10 RX ORDER — FENTANYL CITRATE 50 UG/ML
25 INJECTION, SOLUTION INTRAMUSCULAR; INTRAVENOUS
Status: DISCONTINUED | OUTPATIENT
Start: 2020-11-10 | End: 2020-11-10 | Stop reason: HOSPADM

## 2020-11-10 RX ADMIN — PROPOFOL 100 MG: 10 INJECTION, EMULSION INTRAVENOUS at 08:16

## 2020-11-10 RX ADMIN — LIDOCAINE HYDROCHLORIDE 60 MG: 20 INJECTION, SOLUTION EPIDURAL; INFILTRATION; INTRACAUDAL; PERINEURAL at 08:16

## 2020-11-10 RX ADMIN — PROPOFOL 50 MG: 10 INJECTION, EMULSION INTRAVENOUS at 08:20

## 2020-11-10 RX ADMIN — SODIUM CHLORIDE: 9 INJECTION, SOLUTION INTRAVENOUS at 07:47

## 2020-11-10 NOTE — H&P
Gastroenterology Outpatient History and Physical    Patient: Elvie Gordillo    Physician: Neha Park MD    Chief Complaint: CRC screening  History of Present Illness: 63yo F with need for CRC screening. Hx of episodic abd painbrelated to diet. History:  Past Medical History:   Diagnosis Date    Chronic pain     Diabetes mellitus ([facility] [address] 6/19/2009    Diverticulosis 2009    colonoscopy . Dr. Kimmie King    GERD (gastroesophageal reflux disease)     Headache     HTN (hypertension) 6/19/2009    Hypercholesterolemia 6/19/2009    Osteoarthritis 6/19/2009    Osteoporosis       Past Surgical History:   Procedure Laterality Date    HX DILATION AND CURETTAGE        Social History     Socioeconomic History    Marital status: SINGLE     Spouse name: Not on file    Number of children: Not on file    Years of education: Not on file    Highest education level: Not on file   Tobacco Use    Smoking status: Never Smoker    Smokeless tobacco: Never Used   Substance and Sexual Activity    Alcohol use: No    Drug use: Yes     Types: Prescription, OTC      Family History   Problem Relation Age of Onset    Cancer Other         lung    Diabetes Other     Thyroid Disease Other     Hypertension Mother     Diabetes Mother     Hypertension Father     Diabetes Father     Cancer Father         Lung Cancer      Patient Active Problem List   Diagnosis Code    Bronchitis J40    Diabetes mellitus ([facility] [address] E11.9    Diabetic gastroparesis ([facility] [address] E11.43, K31.84    HTN (hypertension) I10    Hypercholesterolemia E78.00    Osteoarthritis M19.90    Peripheral neuropathy G62.9       Allergies: No Known Allergies  Medications:   Prior to Admission medications    Medication Sig Start Date End Date Taking? Authorizing Provider   aspirin delayed-release 81 mg tablet Take 1 Tab by mouth daily.  11/3/20   Teofilo Valencia MD   ibuprofen (MOTRIN) 600 mg tablet TAKE 1 TAB BY MOUTH EVERY EIGHT (8) HOURS AS NEEDED FOR PAIN. 11/3/20  Yes Alex Lambert MD   butalbital-acetaminophen-caffeine [facility] [place]) -40 mg per tablet TAKE 1 TABLET EVERY 4 HOURS AS NEEDED 11/3/20   Alex Lambert MD   rosuvastatin (CRESTOR) 20 mg tablet TAKE 1 TABLET BY MOUTH EVERY DAY 11/3/20   Alex Lambert MD   brimonidine (ALPHAGAN) 0.2 % ophthalmic solution INSTILL 1 DROP IN [facility] EYE TWICE A DAY 5/24/20  Yes Provider, Historical   hydroCHLOROthiazide (HYDRODIURIL) 25 mg tablet TAKE 1 TAB BY MOUTH DAILY. 90 11/2/20   Alex Lambert MD   losartan (COZAAR) 100 mg tablet TAKE 1 TABLET BY MOUTH EVERY DAY 7/28/20   Alex Lambert MD   metFORMIN (GLUCOPHAGE) 1,000 mg tablet TAKE 1 TABLET BY MOUTH ONCE DAILY  Patient taking differently: TAKE 1 TABLET BY MOUTH AT NIGHT 2/20/20   Alex Lambert MD   ondansetron (ZOFRAN ODT) 4 mg disintegrating tablet Take 1 Tab by mouth every eight (8) hours as needed for Nausea. 2/5/20   Alex Lambert MD   Blood-Glucose Meter monitoring kit Check fsbs every day 250.02 2/5/20   Alex Lambert MD   glucose blood VI test strips (ASCENSIA AUTODISC VI, ONE TOUCH ULTRA TEST VI) strip Check fsbs every day 250.02 2/5/20   Alex Lambert MD   lancets misc Check fsbs every day 250.02 2/5/20   Alex Lambert MD   glucose blood VI test strips (ASCENSIA AUTODISC VI, ONE TOUCH ULTRA TEST VI) strip One Touch Ultra 2 meter Check blood sugars daily. ICD-10 E11.620 12/28/17   Alex Lambert MD   hydrocortisone (HYTONE) 2.5 % topical cream Apply  to affected area two (2) times a day. use thin layer 12/28/17   Alex Lambert MD   Lancets misc One Touch Ultra 2 meter. Check blood sugars daily. ICD-10 E11.620 12/28/17   Alex Lambert MD   timolol (TIMOPTIC) 0.5 % ophthalmic solution Administer 1 Drop to both eyes daily. 12/28/17   Alex Lambert MD     Physical Exam:   Vital Signs: Blood pressure (!) 150/86, pulse 63, temperature 98 °F (36.7 °C), resp.  rate 21, height 5' 1\" (1.549 m), weight 80.3 kg (177 lb), last menstrual period 11/29/2010, SpO2 100 %, not currently breastfeeding.   General: well developed, well nourished   HEENT: unremarkable   Heart: regular rhythm no mumur    Lungs: clear   Abdominal:  benign   Neurological: unremarkable   Extremities: no edema     Findings/Diagnosis: CRC screening  Plan of Care/Planned Procedure: Colonoscopy with conscious/deep sedation    Signed:  Marixa Lorenzo MD 11/10/2020

## 2020-11-10 NOTE — PROGRESS NOTES
Herlinda Carson  1960  [id number]    Situation:  Verbal report received from: HAROON Dunn  Procedure: Procedure(s):  COLONOSCOPY    Background:    Preoperative diagnosis: SCREENING  Postoperative diagnosis: colon - diverticulosis and hemorrhoids     :  Dr. Landen Thompson  Assistant(s): Endoscopy Technician-1: Eneida Pérez  Endoscopy RN-1: Gil Manjarrez RN    Specimens: * No specimens in log *  H. Pylori  no    Assessment:  Intra-procedure medications       Anesthesia gave intra-procedure sedation and medications, see anesthesia flow sheet yes    Intravenous fluids: NS@ KVO     Vital signs stable     Abdominal assessment: round and soft     Recommendation:  Discharge patient per MD   Family or Friend   Permission to share finding with family or friend yes

## 2020-11-10 NOTE — DISCHARGE INSTRUCTIONS
Sriram Vazquez  [id number]  1960    COLON DISCHARGE INSTRUCTIONS  Discomfort:  Redness at IV site- apply warm compress to area; if redness or soreness persist- contact your physician  There may be a slight amount of blood passed from the rectum  Gaseous discomfort- walking, belching will help relieve any discomfort  You may not operate a vehicle for 12 hours  You may not engage in an occupation involving machinery or appliances for rest of today  You may not drink alcoholic beverages for at least 12 hours  Avoid making any critical decisions for at least 24 hour  DIET:   High fiber diet. - however -  remember your colon is empty and a heavy meal will produce gas. Avoid these foods:  vegetables, fried / greasy foods, carbonated drinks for today  MEDICATION:         ACTIVITY:  You may not resume your normal daily activities until tomorrow AM; it is recommended that you spend the remainder of the day resting -  avoid any strenuous activity. CALL M.D.   ANY SIGN OF:   Increasing pain, nausea, vomiting  Abdominal distension (swelling)  New increased bleeding (oral or rectal)  Fever (chills)  Pain in chest area  Bloody discharge from nose or mouth  Shortness of breath    IMPRESSION:  -Normal terminal ileum  -Sigmoid diverticulosis  -Small Grade 1 internal hemorrhoids  -No masses, polyps, or inflammation    Follow-up Instructions:   Call Dr. Christophe Chakraborty if questions arise regarding your procedure  Telephone # [phone]  Repeat colonoscopy in 10 years    Gabi Ward MD

## 2020-11-10 NOTE — PROCEDURES
NAME:  Juvenal Frazier   :   1960   MRN:   [id number]     Date/Time:  11/10/2020 8:32 AM    Colonoscopy Operative Report    Procedure Type:   Colonoscopy --screening     Indications:     Screening colonoscopy  Pre-operative Diagnosis: see indication above  Post-operative Diagnosis:  See findings below  :  Stefan Iqbal MD  Referring Provider: --Desire Mcclure MD    Exam:  Airway: clear, no airway problems anticipated  Heart: RRR, without gallops or rubs  Lungs: clear bilaterally without wheezes, crackles, or rhonchi  Abdomen: soft, nontender, nondistended, bowel sounds present  Mental Status: awake, alert and oriented to person, place and time    Sedation:  MAC anesthesia Propofol 150mg IV  Procedure Details:  After informed consent was obtained with all risks and benefits of procedure explained and preoperative exam completed, the patient was taken to the endoscopy suite and placed in the left lateral decubitus position. Upon sequential sedation as per above, a digital rectal exam was performed demonstrating internal hemorrhoids. The Olympus videocolonoscope  was inserted in the rectum and carefully advanced to the cecum, which was identified by the ileocecal valve and appendiceal orifice. The terminal ileum was evaluated. The quality of preparation was adequate. The colonoscope was slowly withdrawn with careful evaluation between folds. Retroflexion in the rectum was completed demonstrating internal hemorrhoids.      Findings:     -Normal terminal ileum  -Sigmoid diverticulosis  -Small Grade 1 internal hemorrhoids  -No masses, polyps, or inflammation     Specimen Removed:  None. Complications: None. EBL:  None.     Impression:    -Normal terminal ileum  -Sigmoid diverticulosis  -Small Grade 1 internal hemorrhoids  -No masses, polyps, or inflammation     Recommendations: --For colon cancer screening in this average-risk patient, colonoscopy may be repeated in 10 years. High fiber diet. Resume normal medication(s).          Discharge Disposition:  Home in the company of a  when able to ambulate    Stefan Iqbal MD

## 2020-11-10 NOTE — ANESTHESIA PREPROCEDURE EVALUATION
Relevant Problems   No relevant active problems       Anesthetic History   No history of anesthetic complications            Review of Systems / Medical History  Patient summary reviewed, nursing notes reviewed and pertinent labs reviewed    Pulmonary  Within defined limits                 Neuro/Psych         Headaches    Comments: Peripheral neuropathy  Cardiovascular    Hypertension          Hyperlipidemia      Comments: ECG (2/23/16): Sinus  Rhythm   Low voltage in precordial leads.    ABNORMAL      GI/Hepatic/Renal     GERD          Comments: Screening colonoscopy (11/10/20)  Diabetic gastroparesis  Endo/Other    Diabetes: type 2    Obesity and arthritis     Other Findings   Comments: Osteoporosis           Physical Exam    Airway  Mallampati: II  TM Distance: > 6 cm  Neck ROM: normal range of motion   Mouth opening: Normal     Cardiovascular  Regular rate and rhythm,  S1 and S2 normal,  no murmur, click, rub, or gallop             Dental    Dentition: Loose teeth     Pulmonary  Breath sounds clear to auscultation               Abdominal  GI exam deferred       Other Findings            Anesthetic Plan    ASA: 3  Anesthesia type: MAC and total IV anesthesia          Induction: Intravenous  Anesthetic plan and risks discussed with: Patient

## 2020-11-10 NOTE — ANESTHESIA POSTPROCEDURE EVALUATION
Procedure(s):  COLONOSCOPY.    MAC, total IV anesthesia    Anesthesia Post Evaluation        Patient location during evaluation: PACU  Note status: Adequate. Level of consciousness: responsive to verbal stimuli and sleepy but conscious  Pain management: satisfactory to patient  Airway patency: patent  Anesthetic complications: no  Cardiovascular status: acceptable  Respiratory status: acceptable  Hydration status: acceptable  Comments: +Post-Anesthesia Evaluation and Assessment    Patient: Shanda Hernandez MRN: [id number]  SSN: [id number]   YOB: 1960  Age: 64 y.o. Sex: female      Cardiovascular Function/Vital Signs    /63   Pulse 63   Temp 36.7 °C (98 °F)   Resp 18   Ht 5' 1\" (1.549 m)   Wt 80.3 kg (177 lb)   SpO2 100%   Breastfeeding No   BMI 33.44 kg/m²     Patient is status post Procedure(s):  COLONOSCOPY. Nausea/Vomiting: Controlled. Postoperative hydration reviewed and adequate. Pain:  Pain Scale 1: Visual (11/10/20 0830)  Pain Intensity 1: 0 (11/10/20 0830)   Managed. Neurological Status: At baseline. Mental Status and Level of Consciousness: Arousable. Pulmonary Status:   O2 Device: Room air (11/10/20 0831)   Adequate oxygenation and airway patent. Complications related to anesthesia: None    Post-anesthesia assessment completed. No concerns. Signed By: Karuna Rosales MD    11/10/2020  Post anesthesia nausea and vomiting:  controlled      INITIAL Post-op Vital signs: No vitals data found for the desired time range.

## 2020-11-10 NOTE — PROGRESS NOTES
Endoscope was pre-cleaned at the bedside immediately following procedure by Landon Caceres    Medications     Medication Rate/Dose/Volume Action Route Date Time   Administering User Audit    lidocaine (PF) 2% (mg) 60 mg Given IntraVENous 11/10/20  0816  [facility] CRNA     propofol 10 mg/mL (mg) 100 mg Given IntraVENous 11/10/20  0816  [facility] CRNA      50 mg Given IntraVENous  0820  [facility] CRNA     NS (mL)  New Bag IntraVENous 11/10/20  0747  [facility] CRNA         .

## 2020-11-12 ENCOUNTER — TELEPHONE (OUTPATIENT)
Dept: INTERNAL MEDICINE CLINIC | Age: 60
End: 2020-11-12

## 2020-11-12 NOTE — TELEPHONE ENCOUNTER
----- Message from [employer] sent at 11/12/2020 11:03 AM EST -----  Regarding: Dr Siria Cho Message/Vendor Calls    Caller's first and last name: self      Reason for call:Medical advice      Callback required yes/no and why: yes      Best contact number(s):[phone]      Details to clarify the request: Pt is free from 1:30-3:30pm today and tomorrow from a phone call. She doesn't have an answering machine. Pt had a colonoscopy and they found hemorrhoids. Pt is looking from some advice to get rid of this condition like a medication or therapy.        [employer]  Copy[employer]paste [employer]

## 2020-11-17 ENCOUNTER — TELEPHONE (OUTPATIENT)
Dept: INTERNAL MEDICINE CLINIC | Age: 60
End: 2020-11-17

## 2020-11-17 NOTE — TELEPHONE ENCOUNTER
----- Message from Ifrah Geller sent at 11/17/2020 10:04 AM EST -----  Regarding: Dr. Clifton Marte telephone  Patient return call    Caller's first and last name and relationship (if not the patient): pt       Best contact number(s): [phone]      Whose call is being returned: nurse (pt states she is unable to hear her VM)      Details to clarify the request: the reason for the call is in regards to discussing the proper medication that she can take for treating her hemorrhoids. Pt states she is a diabetic and is very cautious about taking certain medication. Pt also states she needs a refill on the butalb medication. Pt states she will be available today 11/17/20 to speak with nurse.          Message from [facility]

## 2020-11-17 NOTE — TELEPHONE ENCOUNTER
Pt's concerns addressed. Pt verbalized understanding of information discussed w/ no further questions at this time.

## 2020-11-19 NOTE — TELEPHONE ENCOUNTER
Renaye Castleman, [address] Office Pool               Caller (if not patient):na   Relationship of caller (if not patient):na   Best contact number(s): [phone]   Name of medication and dosage if known: \"Rosuvastatine\" 20mg , \"Hydrochlorothlavre 25mg\"   \"Losarathan 100mg\" , also \"migraine Rx, starts with Bu, dosage unknown\"   Is patient out of this medication (yes/no): Y   Pharmacy name: [facility] on [address] listed in chart? (yes/no): Y   Pharmacy phone number: [phone]   Date of last visit: 02/05/20   Details to clarify the request: Prev requested r/f, went to Pharm. Last night and they are not there to be picked up, Pharm states they have not approved for R/F yet.  She has been out of medication for three days which she states is causing her headaches     Copy/Paste  [employer]

## 2020-11-20 RX ORDER — BUTALBITAL, ACETAMINOPHEN AND CAFFEINE 50; 325; 40 MG/1; MG/1; MG/1
TABLET ORAL
Qty: 30 TAB | Refills: 0 | Status: SHIPPED | OUTPATIENT
Start: 2020-11-20 | End: 2020-12-18

## 2020-11-20 RX ORDER — LOSARTAN POTASSIUM 100 MG/1
TABLET ORAL
Qty: 90 TAB | Refills: 2 | Status: SHIPPED | OUTPATIENT
Start: 2020-11-20 | End: 2021-08-26

## 2020-11-20 RX ORDER — ROSUVASTATIN CALCIUM 20 MG/1
TABLET, COATED ORAL
Qty: 90 TAB | Refills: 2 | Status: SHIPPED | OUTPATIENT
Start: 2020-11-20 | End: 2021-12-14 | Stop reason: SDUPTHER

## 2020-11-20 RX ORDER — HYDROCHLOROTHIAZIDE 25 MG/1
TABLET ORAL
Qty: 90 TAB | Refills: 2 | Status: SHIPPED | OUTPATIENT
Start: 2020-11-20 | End: 2021-11-28

## 2020-12-09 ENCOUNTER — TELEPHONE (OUTPATIENT)
Dept: INTERNAL MEDICINE CLINIC | Age: 60
End: 2020-12-09

## 2020-12-09 NOTE — TELEPHONE ENCOUNTER
----- Message from Wanda Bonds sent at 12/9/2020  7:33 AM EST -----  Regarding: Dr. Honey Virgen telephone  Caller's first and last name: N/A  Reason for call: Wants info on the Care card  Callback required yes/no and why: yes  Best contact number(s): [phone]  Details to clarify the request: N/A    Copy/paste [facility]

## 2020-12-11 NOTE — TELEPHONE ENCOUNTER
----- Message from [employer] sent at 12/11/2020  9:41 AM EST -----  Regarding: Dr. Armida Gaviria Message/Vendor Calls    Caller's first and last name: Pt      Reason for call: insurance change/med request      Callback required yes/no and why: Yes, verify with pt if this can be done. Best contact number(s):[phone]      Details to clarify the request: Pt states that her insurance will change at the beginning of the new year, but won't take effect until the middle of January. She would like to know if her medications can be filled for 60 days to allow her to have enough until her new insurance with [employer] in. Please advise.       Message from [facility]

## 2020-12-17 ENCOUNTER — OFFICE VISIT (OUTPATIENT)
Dept: ENDOCRINOLOGY | Age: 60
End: 2020-12-17
Payer: COMMERCIAL

## 2020-12-17 VITALS
HEART RATE: 75 BPM | RESPIRATION RATE: 12 BRPM | DIASTOLIC BLOOD PRESSURE: 80 MMHG | BODY MASS INDEX: 33.07 KG/M2 | SYSTOLIC BLOOD PRESSURE: 130 MMHG | WEIGHT: 175 LBS

## 2020-12-17 DIAGNOSIS — E78.2 MIXED HYPERLIPIDEMIA: ICD-10-CM

## 2020-12-17 DIAGNOSIS — I10 ESSENTIAL HYPERTENSION: ICD-10-CM

## 2020-12-17 DIAGNOSIS — E11.42 TYPE 2 DIABETES MELLITUS WITH DIABETIC POLYNEUROPATHY, WITHOUT LONG-TERM CURRENT USE OF INSULIN (HCC): Primary | ICD-10-CM

## 2020-12-17 PROCEDURE — 99205 OFFICE O/P NEW HI 60 MIN: CPT | Performed by: INTERNAL MEDICINE

## 2020-12-17 NOTE — PROGRESS NOTES
Chief Complaint   Patient presents with    Diabetes     pcp and pharmacy verified. Eye exam this year. IN PERSON   I saw her in person. History of Present Illness: Kelin Pizano is a 64 y.o. female who I was asked to see in consult by Dr. Elizabeth Thomson for evaluation of diabetes. Was diagnosed with diabetes at the age of 30. Current regimen is Metformin 1000mg with dinner. She has not been checking her BGs. Most recent Hgb A1c was 6.6% in February 2020. A typical day is as follows:  Pt wakes around 530AM (She works from 7AM-530PM). - She is not typically eating breakfast (\"but I am trying to change that, I an trying to eat oatmeal and an egg white\"). - She will have lunch around 130PM, yesterday she had a salad a pork chop sandwich and cranberry juice. - She denies snacking in the Afternoon  - She has dinner around 6-730PM, last night she had a fried chicken sandwich, no side items and iced tea (stevia). - She will snack on chips and fruit in the evening occasionally. 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 No history of vascular disease. Pt has Neuropathy and gastroparesis. She is followed by Dr. Radha Prescott of GI she recently had a colonoscopy in November 2020, which showed diverticulosis. No history of nephropathy. Last eye exam was in \"early 2020\". She has hx of glaucoma, \"but no one has ever told me I had retinopathy\". She is seen by an eye doctor at [facility]. Will request the records from [facility]. Past Medical History:   Diagnosis Date    Chronic pain     Diabetes mellitus ([address] 6/19/2009    Diverticulosis 2009    colonoscopy .  Dr. Maine King    GERD (gastroesophageal reflux disease)     Headache     HTN (hypertension) 6/19/2009    Hypercholesterolemia 6/19/2009    Osteoarthritis 6/19/2009    Osteoporosis      Past Surgical History:   Procedure Laterality Date    COLONOSCOPY N/A 11/10/2020    COLONOSCOPY performed by Jazmine Puente MD at \[facility]\"" ENDOSCOPY    COLONOSCOPY,DIAGNOSTIC  11/10/2020         HX DILATION AND CURETTAGE       Current Outpatient Medications   Medication Sig    glucose blood VI test strips (ASCENSIA AUTODISC VI, ONE TOUCH ULTRA TEST VI) strip One Touch Ultra 2 meter Check blood sugars twice daily    hydroCHLOROthiazide (HYDRODIURIL) 25 mg tablet TAKE 1 TAB BY MOUTH DAILY. 90    rosuvastatin (CRESTOR) 20 mg tablet TAKE 1 TABLET BY MOUTH EVERY DAY    losartan (COZAAR) 100 mg tablet TAKE 1 TABLET BY MOUTH EVERY DAY    butalbital-acetaminophen-caffeine (FIORICET, ESGIC) -40 mg per tablet TAKE 1 TABLET EVERY 4 HOURS AS NEEDED    aspirin delayed-release 81 mg tablet Take 1 Tab by mouth daily.  ibuprofen (MOTRIN) 600 mg tablet TAKE 1 TAB BY MOUTH EVERY EIGHT (8) HOURS AS NEEDED FOR PAIN.  brimonidine (ALPHAGAN) 0.2 % ophthalmic solution INSTILL 1 DROP IN EACH EYE TWICE A DAY    metFORMIN (GLUCOPHAGE) 1,000 mg tablet TAKE 1 TABLET BY MOUTH ONCE DAILY (Patient taking differently: TAKE 1 TABLET BY MOUTH AT NIGHT)    ondansetron (ZOFRAN ODT) 4 mg disintegrating tablet Take 1 Tab by mouth every eight (8) hours as needed for Nausea.  Blood-Glucose Meter monitoring kit Check fsbs every day 250.02    lancets misc Check fsbs every day 250.02    hydrocortisone (HYTONE) 2.5 % topical cream Apply  to affected area two (2) times a day. use thin layer    Lancets misc One Touch Ultra 2 meter. Check blood sugars daily. ICD-10 E11.620    timolol (TIMOPTIC) 0.5 % ophthalmic solution Administer 1 Drop to both eyes daily. No current facility-administered medications for this visit.       No Known Allergies  Family History   Problem Relation Age of Onset    Cancer Other         lung    Diabetes Other     Thyroid Disease Other     Hypertension Mother     Diabetes Mother     Kidney Disease Mother     Diabetes Father     Cancer Father         Lung Cancer    Diabetes Brother     Heart Failure Maternal Grandmother     Diabetes Maternal Grandmother     No Known Problems Maternal Grandfather     No Known Problems Paternal Grandmother     No Known Problems Paternal Grandfather     Diabetes Brother         \"Borderline\"    Alzheimer Paternal Aunt      Social History     Socioeconomic History    Marital status: SINGLE     Spouse name: Not on file    Number of children: Not on file    Years of education: Not on file    Highest education level: Not on file   Occupational History    Not on file   Social Needs    Financial resource strain: Not on file    Food insecurity     Worry: Not on file     Inability: Not on file   [employer] needs     Medical: Not on file     Non-medical: Not on file   Tobacco Use    Smoking status: Never Smoker    Smokeless tobacco: Never Used   Substance and Sexual Activity    Alcohol use: No    Drug use: Never    Sexual activity: Not on file   Lifestyle    Physical activity     Days per week: Not on file     Minutes per session: Not on file    Stress: Not on file   Relationships    Social connections     Talks on phone: Not on file     Gets together: Not on file     Attends Mor[employer] service: Not on file     Active member of club or organization: Not on file     Attends meetings of clubs or organizations: Not on file     Relationship status: Not on file    Intimate partner violence     Fear of current or ex partner: Not on file     Emotionally abused: Not on file     Physically abused: Not on file     Forced sexual activity: Not on file   Other Topics Concern    Not on file   Social History Narrative    Not on file     Review of Systems:  - Constitutional Symptoms: no fevers, chills, weight loss  - Eyes: no blurry vision or double vision  - Cardiovascular: no chest pain or palpitations  - Respiratory: no cough or shortness of breath  - Gastrointestinal: no dysphagia or abdominal pain  - Musculoskeletal: no joint pains or weakness  - Integumentary: no rashes  - Neurological: no numbness, tingling, or headaches  - Psychiatric: no depression or anxiety  - Endocrine: no heat or cold intolerance, no polyuria or polydipsia    Physical Examination:  Blood pressure 130/80, pulse 75, resp.  rate 12, weight 175 lb (79.4 kg), last menstrual period 11/29/2010.  - General: pleasant, no distress, good eye contact  - HEENT: no exopthalmos, no periorbital edema, no scleral/conjunctival injection, EOMI, no lid lag or stare  - Neck: supple, no thyromegaly, masses, lymph nodes, or carotid bruits, no supraclavicular or dorsocervical fat pads  - Cardiovascular: regular, normal rate, normal S1 and S2, no murmurs/rubs/gallops, 2+ dorsalis pedis pulses bilaterally  - Respiratory: clear to auscultation bilaterally  - Gastrointestinal: soft, nontender, nondistended, no masses, no hepatosplenomegaly  - Musculoskeletal: no proximal muscle weakness in upper or lower extremities  - Integumentary: + acanthosis nigricans, no abdominal striae, no rashes, no edema, no foot ulcers  - Neurological: intact sensation to monofilament 4/4 locations, intact vibratory sensation at great toes bilaterally, + calluses bilaterally  - Psychiatric: normal mood and affect    Data Reviewed:   Component      Latest Ref Rng & Units 8/23/2020 8/23/2020 2/5/2020 2/5/2020           3:50 PM  3:50 PM 12:33 PM 12:33 PM   WBC      3.6 - 11.0 K/uL  8.8     RBC      3.80 - 5.20 M/uL  4.10     HGB      11.5 - 16.0 g/dL  11.9     HCT      35.0 - 47.0 %  35.6     MCV      80.0 - 99.0 FL  86.8     MCH      26.0 - 34.0 PG  29.0     MCHC      30.0 - 36.5 g/dL  33.4     RDW      11.5 - 14.5 %  14.1     PLATELET      448 - 107 K/uL  241     MPV      8.9 - 12.9 FL  10.9     NRBC      0  WBC  0.0     ABSOLUTE NRBC      0.00 - 0.01 K/uL  0.00     NEUTROPHILS      32 - 75 %  46     LYMPHOCYTES      12 - 49 %  40     MONOCYTES      5 - 13 %  11     EOSINOPHILS      0 - 7 %  2     BASOPHILS      0 - 1 %  1     IMMATURE GRANULOCYTES      0.0 - 0.5 %  0     ABS. NEUTROPHILS      1.8 - 8.0 K/UL  4.1     ABS. LYMPHOCYTES      0.8 - 3.5 K/UL  3.5     ABS. MONOCYTES      0.0 - 1.0 K/UL  1.0     ABS. EOSINOPHILS      0.0 - 0.4 K/UL  0.1     ABS. BASOPHILS      0.0 - 0.1 K/UL  0.0     ABS. IMM. GRANS.      0.00 - 0.04 K/UL  0.0     DF        AUTOMATED     Sodium      136 - 145 mmol/L 139      Potassium      3.5 - 5.1 mmol/L 3.3 (L)      Chloride      97 - 108 mmol/L 105      CO2      21 - 32 mmol/L 29      Anion gap      5 - 15 mmol/L 5      Glucose      65 - 100 mg/dL 167 (H)      BUN      6 - 20 MG/DL 17      Creatinine      0.55 - 1.02 MG/DL 0.86      BUN/Creatinine ratio      12 - 20   20      GFR est AA      >60 ml/min/1.73m2 >60      GFR est non-AA      >60 ml/min/1.73m2 >60      Calcium      8.5 - 10.1 MG/DL 8.9      Bilirubin, total      0.2 - 1.0 MG/DL 0.3      ALT      12 - 78 U/L 33      AST      15 - 37 U/L 17      Alk. phosphatase      45 - 117 U/L 94      Protein, total      6.4 - 8.2 g/dL 7.4      Albumin      3.5 - 5.0 g/dL 3.5      Globulin      2.0 - 4.0 g/dL 3.9      A-G Ratio      1.1 - 2.2   0.9 (L)      Creatinine, urine      Not Estab. mg/dL    52.7   Microalbumin, urine      Not Estab. ug/mL    <3.0   Microalbumin/Creat. Ratio      0 - 29 mg/g creat    <6   TSH      0.450 - 4.500 uIU/mL   1.660      Component      Latest Ref Rng & Units 2/5/2020 2/5/2020          12:33 PM 12:33 PM   Cholesterol, total      100 - 199 mg/dL  151   Triglyceride      0 - 149 mg/dL  55   HDL Cholesterol      >39 mg/dL  52   VLDL, calculated      5 - 40 mg/dL  11   LDL, calculated      0 - 99 mg/dL  88   Hemoglobin A1c, (calculated)      4.8 - 5.6 % 6.6 (H)    Estimated average glucose      mg/dL 143      INDICATION:   RUQ pain, nausea     COMPARISON:  December 5, 2017     FINDINGS:      Limited right upper quadrant ultrasound was performed.      Gallbladder:   No gallstones. No wall thickening.   Negative sonographic Sood's  sign. Common Bile Duct:  2 mm. Liver:  No enlargement or focal liver lesion. Main Portal Vein:  Patent and appropriate hepatopetal flow. Pancreas Head (Body and Tail not evaluated): Obscured by bowel gas. Right kidney:  10 cm in length. No nephrolithiasis or hydronephrosis. Other:  None.     IMPRESSION  IMPRESSION:   No acute process. Assessment/Plan:   1. Type 2 diabetes mellitus with diabetic polyneuropathy, without long-term current use of insulin ([address]    2. Essential hypertension    3. Mixed hyperlipidemia    1) Pt is not checking her BGs, but does not report symptoms of hypoglycemia. Her A1C in February 2020 was 6.6%. For now pt to continue the Metformin 1000mg BID. Will order an A1C and adjust her regimen as needed. Pt given copy of \"Daily Diabetes Meal Planning Guide\" and educated about the \"[place] dinner plate\", reading food labels and which foods have the highest carbohydrates. Pt instructed to limit her carbohydrates to 45-60 grams with meals and 15 grams or less with snacks (but to limit snacks). With her hx of neuropathy and calluses, she would benefit from DM shoes and inserts    2) Pt is on an ARB for renal protection. Her BP toda was 130/80. Will not make any changes at this time. Will order a Urine MA today. 3) Pt's lipid panel was at goal in February 2020, she is taking Rosuvastatin 20mg daily. Will order a lipid panel and CMP today. Pt voices understanding and agreement with the plan. Pain noted and pt was recommended to call her PCP for further evaluation and treatment, as needed    RTC 4 months      Follow-up and Dispositions    · Return in about 4 months (around 4/17/2021).          Copy sent to:  Dr. Clifton Goetz

## 2020-12-18 RX ORDER — BUTALBITAL, ACETAMINOPHEN AND CAFFEINE 50; 325; 40 MG/1; MG/1; MG/1
TABLET ORAL
Qty: 30 TAB | Refills: 0 | Status: SHIPPED | OUTPATIENT
Start: 2020-12-18 | End: 2022-06-06 | Stop reason: ALTCHOICE

## 2021-02-02 ENCOUNTER — TELEPHONE (OUTPATIENT)
Dept: INTERNAL MEDICINE CLINIC | Age: 61
End: 2021-02-02

## 2021-02-02 NOTE — TELEPHONE ENCOUNTER
General Message/Vendor Calls     Caller's first and last name: n/a       Reason for call: would like to know if it is ok to take Tylenol PM to help her sleep       Callback required yes/no and why: yes     Best contact number(s):[phone]       Details to clarify the request: Lupis March

## 2021-03-29 RX ORDER — METFORMIN HYDROCHLORIDE 1000 MG/1
TABLET ORAL
Qty: 30 TAB | Refills: 11 | Status: SHIPPED | OUTPATIENT
Start: 2021-03-29 | End: 2022-06-04

## 2021-04-20 ENCOUNTER — DOCUMENTATION ONLY (OUTPATIENT)
Dept: ENDOCRINOLOGY | Age: 61
End: 2021-04-20

## 2021-06-14 LAB — HBA1C MFR BLD HPLC: 6.9 %

## 2021-08-26 RX ORDER — LOSARTAN POTASSIUM 100 MG/1
TABLET ORAL
Qty: 30 TABLET | Refills: 3 | Status: SHIPPED | OUTPATIENT
Start: 2021-08-26 | End: 2021-12-14

## 2021-11-28 RX ORDER — HYDROCHLOROTHIAZIDE 25 MG/1
TABLET ORAL
Qty: 30 TABLET | Refills: 2 | Status: SHIPPED | OUTPATIENT
Start: 2021-11-28 | End: 2021-12-14

## 2021-12-14 RX ORDER — IBUPROFEN 600 MG/1
TABLET ORAL
Qty: 60 TABLET | Refills: 1 | Status: SHIPPED | OUTPATIENT
Start: 2021-12-14 | End: 2022-03-19

## 2021-12-14 RX ORDER — ROSUVASTATIN CALCIUM 20 MG/1
TABLET, COATED ORAL
Qty: 90 TABLET | Refills: 2 | Status: SHIPPED | OUTPATIENT
Start: 2021-12-14 | End: 2022-04-13

## 2021-12-14 RX ORDER — LOSARTAN POTASSIUM 100 MG/1
TABLET ORAL
Qty: 30 TABLET | Refills: 3 | Status: SHIPPED | OUTPATIENT
Start: 2021-12-14 | End: 2022-02-27

## 2021-12-14 RX ORDER — HYDROCHLOROTHIAZIDE 25 MG/1
TABLET ORAL
Qty: 90 TABLET | Refills: 3 | Status: SHIPPED | OUTPATIENT
Start: 2021-12-14 | End: 2022-06-06 | Stop reason: SDUPTHER

## 2022-02-24 ENCOUNTER — TELEPHONE (OUTPATIENT)
Dept: INTERNAL MEDICINE CLINIC | Age: 62
End: 2022-02-24

## 2022-02-27 RX ORDER — VALSARTAN 160 MG/1
160 TABLET ORAL DAILY
Qty: 90 TABLET | Refills: 0 | Status: SHIPPED | OUTPATIENT
Start: 2022-02-27 | End: 2022-06-06 | Stop reason: ALTCHOICE

## 2022-03-19 RX ORDER — IBUPROFEN 600 MG/1
TABLET ORAL
Qty: 60 TABLET | Refills: 1 | Status: SHIPPED | OUTPATIENT
Start: 2022-03-19 | End: 2022-08-30 | Stop reason: ALTCHOICE

## 2022-04-13 RX ORDER — ROSUVASTATIN CALCIUM 20 MG/1
TABLET, COATED ORAL
Qty: 30 TABLET | Refills: 8 | Status: SHIPPED | OUTPATIENT
Start: 2022-04-13 | End: 2022-06-06 | Stop reason: SDUPTHER

## 2022-05-19 ENCOUNTER — TELEPHONE (OUTPATIENT)
Dept: INTERNAL MEDICINE CLINIC | Age: 62
End: 2022-05-19

## 2022-05-19 NOTE — TELEPHONE ENCOUNTER
Patient is going to run out her meds prior to June but needs to wait to see Dr. Yogi Esparza in June due to change in her insurance.

## 2022-05-20 NOTE — TELEPHONE ENCOUNTER
I called patient back in regards to message. Unable to reach, left VM to return call. Sally Heredia has a pharmacy resource that we could provide patient. It is a pharmacy that provides certain medications at at low cost. Could you get this information from her so I can provide to patient when she returns call.

## 2022-05-31 ENCOUNTER — TELEPHONE (OUTPATIENT)
Dept: INTERNAL MEDICINE CLINIC | Age: 62
End: 2022-05-31

## 2022-05-31 DIAGNOSIS — E11.9 CONTROLLED TYPE 2 DIABETES MELLITUS WITHOUT COMPLICATION, WITHOUT LONG-TERM CURRENT USE OF INSULIN (HCC): Primary | ICD-10-CM

## 2022-05-31 DIAGNOSIS — E78.00 PURE HYPERCHOLESTEROLEMIA: ICD-10-CM

## 2022-05-31 DIAGNOSIS — I10 HYPERTENSION, UNSPECIFIED TYPE: ICD-10-CM

## 2022-05-31 NOTE — TELEPHONE ENCOUNTER
I have attempted without success to contact this patient by phone. Unable to reach patient at this time. Personal VM set up. Left detailed message informing patient that lab orders were entered at this time and, if anything further was needed, please contact office at that time.

## 2022-05-31 NOTE — TELEPHONE ENCOUNTER
Patient would like labs prior to her appointment. Please call patient. Thanks. If she is not able to get to phone, please leave a message and let her know labs are ready. Thanks.

## 2022-06-04 RX ORDER — METFORMIN HYDROCHLORIDE 1000 MG/1
TABLET ORAL
Qty: 30 TABLET | Refills: 11 | Status: SHIPPED | OUTPATIENT
Start: 2022-06-04 | End: 2022-06-06 | Stop reason: SDUPTHER

## 2022-06-06 ENCOUNTER — OFFICE VISIT (OUTPATIENT)
Dept: INTERNAL MEDICINE CLINIC | Age: 62
End: 2022-06-06

## 2022-06-06 VITALS
HEART RATE: 66 BPM | HEIGHT: 61 IN | RESPIRATION RATE: 16 BRPM | TEMPERATURE: 97.2 F | OXYGEN SATURATION: 98 % | WEIGHT: 162.4 LBS | DIASTOLIC BLOOD PRESSURE: 70 MMHG | SYSTOLIC BLOOD PRESSURE: 110 MMHG | BODY MASS INDEX: 30.66 KG/M2

## 2022-06-06 DIAGNOSIS — E11.9 TYPE 2 DIABETES MELLITUS WITHOUT COMPLICATION, WITHOUT LONG-TERM CURRENT USE OF INSULIN (HCC): ICD-10-CM

## 2022-06-06 DIAGNOSIS — G47.00 INSOMNIA, UNSPECIFIED TYPE: ICD-10-CM

## 2022-06-06 DIAGNOSIS — I10 HYPERTENSION, UNSPECIFIED TYPE: ICD-10-CM

## 2022-06-06 DIAGNOSIS — E78.00 PURE HYPERCHOLESTEROLEMIA: ICD-10-CM

## 2022-06-06 DIAGNOSIS — Z00.00 ROUTINE GENERAL MEDICAL EXAMINATION AT A HEALTH CARE FACILITY: Primary | ICD-10-CM

## 2022-06-06 PROCEDURE — 99396 PREV VISIT EST AGE 40-64: CPT | Performed by: INTERNAL MEDICINE

## 2022-06-06 RX ORDER — LOSARTAN POTASSIUM 100 MG/1
100 TABLET ORAL DAILY
COMMUNITY
Start: 2022-04-07 | End: 2022-07-06 | Stop reason: SDUPTHER

## 2022-06-06 RX ORDER — BUTALBITAL, ACETAMINOPHEN AND CAFFEINE 50; 325; 40 MG/1; MG/1; MG/1
TABLET ORAL
Qty: 30 TABLET | Refills: 0 | Status: SHIPPED | OUTPATIENT
Start: 2022-06-06

## 2022-06-06 RX ORDER — HYDROCHLOROTHIAZIDE 25 MG/1
TABLET ORAL
Qty: 90 TABLET | Refills: 3 | Status: SHIPPED | OUTPATIENT
Start: 2022-06-06

## 2022-06-06 RX ORDER — ROSUVASTATIN CALCIUM 20 MG/1
20 TABLET, COATED ORAL DAILY
Qty: 90 TABLET | Refills: 3 | Status: SHIPPED | OUTPATIENT
Start: 2022-06-06

## 2022-06-06 RX ORDER — MECLIZINE HYDROCHLORIDE 25 MG/1
25 TABLET ORAL
Qty: 30 TABLET | Refills: 0 | Status: SHIPPED | OUTPATIENT
Start: 2022-06-06 | End: 2022-07-06 | Stop reason: SDUPTHER

## 2022-06-06 RX ORDER — METFORMIN HYDROCHLORIDE 1000 MG/1
1000 TABLET ORAL DAILY
Qty: 90 TABLET | Refills: 3 | Status: SHIPPED | OUTPATIENT
Start: 2022-06-06

## 2022-06-06 NOTE — PROGRESS NOTES
1. \"Have you been to the ER, urgent care clinic since your last visit? Hospitalized since your last visit? \" [facility] ED x 3 weeks ago  Shoulder pain     2. \"Have you seen or consulted any other health care providers outside of the [address] [place] [address] since your last visit? \" No    3. For patients aged 39-70: Has the patient had a colonoscopy / FIT/ Cologuard? Noted in chart 2020      If the patient is female:    4. For patients aged 41-77: Has the patient had a mammogram within the past 2 years? Noted in chart 2020      5. For patients aged 21-65: Has the patient had a pap smear?  No

## 2022-06-06 NOTE — PROGRESS NOTES
HISTORY OF PRESENT ILLNESS  Guanako Evans is a 63 y.o. female. HPI   fu dm-2 htn hld tension headaches and cpe  Gyn MD-not seeing MD currently  Last A1c 6.6 LDL 80  fsbs-none  No cp sob or neuropathy symtpoms  Will see eye MD again soon for eye exam-had glaucoma surgery in the past  Lost insurance 2 yrs ago but has coverage now    rarely takes fioricet now for headaces  Takes vit d 3 1000 iu every day and on b12  Due for shingrix tdap prevnar 20  Has some difficulty falling asleep and only gets bout 5 hours of sleep  Last OV    ER 2 d ago for abdominal pain x1-2 weeks--US , labs negative     Was given med for gas which caused diarrhea  felt weak yesterday   Some side pain on right side  Colonoscopy was recommended by ER MD-seeing Dr Rose Whitlock in 2 months  Not eating much but drinking fluids  Has gas type pain  Has some watery stools today  Bentyl was prescribed by ER MD --taking qid  Overall feesl better todayu    Patient Active Problem List    Diagnosis Date Noted    Peripheral neuropathy 01/30/2012    Bronchitis 06/19/2009    Diabetes mellitus ([facility] 06/19/2009    Diabetic gastroparesis ([facility] [address] 06/19/2009    HTN (hypertension) 06/19/2009    Hypercholesterolemia 06/19/2009    Osteoarthritis 06/19/2009     Current Outpatient Medications   Medication Sig Dispense Refill    losartan (COZAAR) 100 mg tablet Take 100 mg by mouth daily.  butalbital-acetaminophen-caffeine (FIORICET, ESGIC) -40 mg per tablet TAKE 1 TABLET BY MOUTH EVERY 4 HOURS AS NEEDED 30 Tablet 0    meclizine (ANTIVERT) 25 mg tablet Take 1 Tablet by mouth three (3) times daily as needed for Dizziness for up to 10 days. 30 Tablet 0    hydroCHLOROthiazide (HYDRODIURIL) 25 mg tablet One qd 90 Tablet 3    rosuvastatin (CRESTOR) 20 mg tablet Take 1 Tablet by mouth daily. 90 Tablet 3    metFORMIN (GLUCOPHAGE) 1,000 mg tablet Take 1 Tablet by mouth daily. 90 Tablet 3    aspirin delayed-release 81 mg tablet Take 1 Tab by mouth daily.  90 Tab 3    brimonidine (ALPHAGAN) 0.2 % ophthalmic solution INSTILL 1 DROP IN EACH EYE TWICE A DAY      ondansetron (ZOFRAN ODT) 4 mg disintegrating tablet Take 1 Tab by mouth every eight (8) hours as needed for Nausea. 20 Tab 0    Blood-Glucose Meter monitoring kit Check fsbs every day 250.02 1 Kit 0    timolol (TIMOPTIC) 0.5 % ophthalmic solution Administer 1 Drop to both eyes daily. 10 mL 4    ibuprofen (MOTRIN) 600 mg tablet TAKE 1 TABLET BY MOUTH EVERY 8 HOURS AS NEEDED FOR PAIN 60 Tablet 1    glucose blood VI test strips (ASCENSIA AUTODISC VI, ONE TOUCH ULTRA TEST VI) strip One Touch Ultra 2 meter Check blood sugars twice daily 200 Strip 3    lancets misc Check fsbs every day 250.02 1 Each 11    hydrocortisone (HYTONE) 2.5 % topical cream Apply  to affected area two (2) times a day. use thin layer 30 g 0    Lancets misc One Touch Ultra 2 meter. Check blood sugars daily.  ICD-10 E11.620 100 Each 11     No Known Allergies   Lab Results   Component Value Date/Time    WBC 8.8 08/23/2020 03:50 PM    HGB 11.9 08/23/2020 03:50 PM    HCT 35.6 08/23/2020 03:50 PM    PLATELET 500 99/25/6854 03:50 PM    MCV 86.8 08/23/2020 03:50 PM     Lab Results   Component Value Date/Time    Hemoglobin A1c 6.6 (H) 02/05/2020 12:33 PM    Hemoglobin A1c 6.2 (H) 11/20/2017 12:34 PM    Hemoglobin A1c 6.6 (H) 03/16/2017 10:47 AM    Hemoglobin A1c, External 6.9 06/14/2021 12:00 AM    Glucose 167 (H) 08/23/2020 03:50 PM    Glucose  (A) 12/22/2015 10:15 AM    Microalb/Creat ratio (ug/mg creat.) <6 02/05/2020 12:33 PM    LDL, calculated 88 02/05/2020 12:33 PM    Creatinine 0.86 08/23/2020 03:50 PM      Lab Results   Component Value Date/Time    Cholesterol, total 151 02/05/2020 12:33 PM    HDL Cholesterol 52 02/05/2020 12:33 PM    LDL, calculated 88 02/05/2020 12:33 PM    Triglyceride 55 02/05/2020 12:33 PM    CHOL/HDL Ratio 3.2 07/07/2010 12:14 PM     Lab Results   Component Value Date/Time    GFR est non-AA >60 08/23/2020 03:50 PM GFR est AA >60 08/23/2020 03:50 PM    Creatinine 0.86 08/23/2020 03:50 PM    BUN 17 08/23/2020 03:50 PM    Sodium 139 08/23/2020 03:50 PM    Potassium 3.3 (L) 08/23/2020 03:50 PM    Chloride 105 08/23/2020 03:50 PM    CO2 29 08/23/2020 03:50 PM     Lab Results   Component Value Date/Time    TSH 1.660 02/05/2020 12:33 PM      Lab Results   Component Value Date/Time    Glucose 167 (H) 08/23/2020 03:50 PM    Glucose  (A) 12/22/2015 10:15 AM         ROS    Physical Exam  Vitals and nursing note reviewed. Constitutional:       Appearance: Normal appearance. She is well-developed. She is obese. Comments: Appears stated age   HENT:      Right Ear: Tympanic membrane normal.      Left Ear: Tympanic membrane normal.   Cardiovascular:      Rate and Rhythm: Normal rate and regular rhythm. Heart sounds: Normal heart sounds. No murmur heard. No friction rub. No gallop. Pulmonary:      Effort: Pulmonary effort is normal. No respiratory distress. Breath sounds: Normal breath sounds. No wheezing. Abdominal:      General: Bowel sounds are normal.      Palpations: Abdomen is soft. Musculoskeletal:      Cervical back: Normal range of motion. Skin:     General: Skin is warm. Neurological:      Mental Status: She is alert. Psychiatric:         Mood and Affect: Mood normal.         Behavior: Behavior normal.         Thought Content: Thought content normal.         Judgment: Judgment normal.         ASSESSMENT and PLAN  Diagnoses and all orders for this visit:    1. Routine general medical examination at a health care facility  -     HEMOGLOBIN A1C WITH EAG; Future  -     CBC W/O DIFF; Future  -     METABOLIC PANEL, COMPREHENSIVE; Future  -     LIPID PANEL; Future  -     TSH 3RD GENERATION; Future  -     MICROALBUMIN, UR, RAND W/ MICROALB/CREAT RATIO; Future  -     REFERRAL TO GYNECOLOGY  -     [facility] MAMMO BI SCREENING INCL CAD; Future   Recommended shingrix and prevnar 20-pt will consider    2.  Type 2 diabetes mellitus without complication, without long-term current use of insulin (HCC)  -     HEMOGLOBIN A1C WITH EAG; Future  -     MICROALBUMIN, UR, RAND W/ MICROALB/CREAT RATIO; Future   Pt will get testing supplies for health insurance per pt  3. Hypertension, unspecified type   controlled  4. Pure hypercholesterolemia   On statin  5. Insomnia, unspecified type  -     SLEEP MEDICINE REFERRAL  6. Migraine headaches   Refilled fioricet for prn use  Other orders  -     butalbital-acetaminophen-caffeine (FIORICET, ESGIC) -40 mg per tablet; TAKE 1 TABLET BY MOUTH EVERY 4 HOURS AS NEEDED  -     meclizine (ANTIVERT) 25 mg tablet; Take 1 Tablet by mouth three (3) times daily as needed for Dizziness for up to 10 days. -     hydroCHLOROthiazide (HYDRODIURIL) 25 mg tablet; One qd  -     rosuvastatin (CRESTOR) 20 mg tablet; Take 1 Tablet by mouth daily. -     metFORMIN (GLUCOPHAGE) 1,000 mg tablet; Take 1 Tablet by mouth daily. Follow-up and Dispositions    · Return in about 6 months (around 12/6/2022) for dm-2 htn hld.

## 2022-06-06 NOTE — PATIENT INSTRUCTIONS
Office Policies    Phone calls/patient messages:            Please allow up to 24 hours for someone in the office to contact you about your call or message. Be mindful your provider may be out of the office or your message may require further review. We encourage you to use [employer] for your messages as this is a faster, more efficient way to communicate with our office                         Medication Refills:            Prescription medications require 48-72 business hours to process. We encourage you to use [facility] for your refills. For controlled medications: Please allow 72 business hours to process. Certain medications may require you to  a written prescription at our office. NO narcotic/controlled medications will be prescribed after 4pm Monday through Friday or on weekends              Form/Paperwork Completion:            Please note a $25 fee may incur for all paperwork for completed by our providers. We ask that you allow 7-10 business days. Pre-payment is due prior to picking up/faxing the completed form. You may also download your forms to [employer] to have your doctor print off.

## 2022-06-07 LAB
ALBUMIN SERPL-MCNC: 4.1 G/DL (ref 3.5–5)
ALBUMIN/GLOB SERPL: 1.2 {RATIO} (ref 1.1–2.2)
ALP SERPL-CCNC: 88 U/L (ref 45–117)
ALT SERPL-CCNC: 26 U/L (ref 12–78)
ANION GAP SERPL CALC-SCNC: 7 MMOL/L (ref 5–15)
AST SERPL-CCNC: 16 U/L (ref 15–37)
BILIRUB SERPL-MCNC: 0.4 MG/DL (ref 0.2–1)
BUN SERPL-MCNC: 20 MG/DL (ref 6–20)
BUN/CREAT SERPL: 23 (ref 12–20)
CALCIUM SERPL-MCNC: 9.9 MG/DL (ref 8.5–10.1)
CHLORIDE SERPL-SCNC: 105 MMOL/L (ref 97–108)
CHOLEST SERPL-MCNC: 151 MG/DL
CO2 SERPL-SCNC: 28 MMOL/L (ref 21–32)
CREAT SERPL-MCNC: 0.86 MG/DL (ref 0.55–1.02)
CREAT UR-MCNC: 87.1 MG/DL
ERYTHROCYTE [DISTWIDTH] IN BLOOD BY AUTOMATED COUNT: 13.8 % (ref 11.5–14.5)
EST. AVERAGE GLUCOSE BLD GHB EST-MCNC: 148 MG/DL
GLOBULIN SER CALC-MCNC: 3.4 G/DL (ref 2–4)
GLUCOSE SERPL-MCNC: 122 MG/DL (ref 65–100)
HBA1C MFR BLD: 6.8 % (ref 4–5.6)
HCT VFR BLD AUTO: 41.6 % (ref 35–47)
HDLC SERPL-MCNC: 60 MG/DL
HDLC SERPL: 2.5 {RATIO} (ref 0–5)
HGB BLD-MCNC: 13.4 G/DL (ref 11.5–16)
LDLC SERPL CALC-MCNC: 79.4 MG/DL (ref 0–100)
MCH RBC QN AUTO: 29.3 PG (ref 26–34)
MCHC RBC AUTO-ENTMCNC: 32.2 G/DL (ref 30–36.5)
MCV RBC AUTO: 90.8 FL (ref 80–99)
MICROALBUMIN UR-MCNC: 1.11 MG/DL
MICROALBUMIN/CREAT UR-RTO: 13 MG/G (ref 0–30)
NRBC # BLD: 0 K/UL (ref 0–0.01)
NRBC BLD-RTO: 0 PER 100 WBC
PLATELET # BLD AUTO: 297 K/UL (ref 150–400)
PMV BLD AUTO: 12 FL (ref 8.9–12.9)
POTASSIUM SERPL-SCNC: 3.6 MMOL/L (ref 3.5–5.1)
PROT SERPL-MCNC: 7.5 G/DL (ref 6.4–8.2)
RBC # BLD AUTO: 4.58 M/UL (ref 3.8–5.2)
SODIUM SERPL-SCNC: 140 MMOL/L (ref 136–145)
TRIGL SERPL-MCNC: 58 MG/DL (ref ?–150)
TSH SERPL DL<=0.05 MIU/L-ACNC: 1.08 UIU/ML (ref 0.36–3.74)
VLDLC SERPL CALC-MCNC: 11.6 MG/DL
WBC # BLD AUTO: 11 K/UL (ref 3.6–11)

## 2022-06-22 ENCOUNTER — TELEPHONE (OUTPATIENT)
Dept: SLEEP MEDICINE | Age: 62
End: 2022-06-22

## 2022-06-22 NOTE — TELEPHONE ENCOUNTER
Called patient to schedule sleep consult, per Dr. Gabe Barrientos. Per patient she is on her way to work and will call us later to schedule.

## 2022-07-07 RX ORDER — LOSARTAN POTASSIUM 100 MG/1
100 TABLET ORAL DAILY
Qty: 90 TABLET | Refills: 2 | Status: SHIPPED | OUTPATIENT
Start: 2022-07-07

## 2022-07-07 RX ORDER — MECLIZINE HYDROCHLORIDE 25 MG/1
25 TABLET ORAL
Qty: 30 TABLET | Refills: 0 | Status: SHIPPED | OUTPATIENT
Start: 2022-07-07 | End: 2022-07-17

## 2022-07-07 NOTE — TELEPHONE ENCOUNTER
Future Appointments:  Future Appointments   Date Time Provider Dede Mason   7/22/2022  2:40 PM [facility] 3 [facility]        Last Appointment With Me:  6/6/2022     Requested Prescriptions     Pending Prescriptions Disp Refills    losartan (COZAAR) 100 mg tablet 90 Tablet 2     Sig: Take 1 Tablet by mouth daily.  meclizine (ANTIVERT) 25 mg tablet 30 Tablet 0     Sig: Take 1 Tablet by mouth three (3) times daily as needed for Dizziness for up to 10 days.

## 2022-08-23 DIAGNOSIS — M25.551 RIGHT HIP PAIN: Primary | ICD-10-CM

## 2022-08-23 DIAGNOSIS — M25.561 RIGHT KNEE PAIN, UNSPECIFIED CHRONICITY: ICD-10-CM

## 2022-08-29 ENCOUNTER — HOSPITAL ENCOUNTER (OUTPATIENT)
Dept: GENERAL RADIOLOGY | Age: 62
Discharge: HOME | End: 2022-08-29
Payer: COMMERCIAL

## 2022-08-29 DIAGNOSIS — M25.561 RIGHT KNEE PAIN, UNSPECIFIED CHRONICITY: ICD-10-CM

## 2022-08-29 DIAGNOSIS — M25.551 RIGHT HIP PAIN: ICD-10-CM

## 2022-08-29 PROCEDURE — 73564 X-RAY EXAM KNEE 4 OR MORE: CPT

## 2022-08-29 PROCEDURE — 73502 X-RAY EXAM HIP UNI 2-3 VIEWS: CPT

## 2022-08-30 ENCOUNTER — OFFICE VISIT (OUTPATIENT)
Dept: ORTHOPEDIC SURGERY | Age: 62
End: 2022-08-30
Payer: COMMERCIAL

## 2022-08-30 VITALS
SYSTOLIC BLOOD PRESSURE: 135 MMHG | DIASTOLIC BLOOD PRESSURE: 66 MMHG | HEIGHT: 61 IN | OXYGEN SATURATION: 100 % | BODY MASS INDEX: 30.78 KG/M2 | TEMPERATURE: 96.9 F | HEART RATE: 68 BPM | WEIGHT: 163 LBS

## 2022-08-30 DIAGNOSIS — M16.11 UNILATERAL PRIMARY OSTEOARTHRITIS, RIGHT HIP: ICD-10-CM

## 2022-08-30 DIAGNOSIS — M17.11 UNILATERAL PRIMARY OSTEOARTHRITIS, RIGHT KNEE: Primary | ICD-10-CM

## 2022-08-30 PROCEDURE — 99204 OFFICE O/P NEW MOD 45 MIN: CPT | Performed by: ORTHOPAEDIC SURGERY

## 2022-08-30 RX ORDER — DICLOFENAC SODIUM 75 MG/1
75 TABLET, DELAYED RELEASE ORAL 2 TIMES DAILY
Qty: 60 TABLET | Refills: 0 | Status: SHIPPED | OUTPATIENT
Start: 2022-08-30 | End: 2022-09-13

## 2022-08-30 NOTE — PROGRESS NOTES
8/30/2022    Chief Complaint: Right hip pain, right knee pain    Assessment: Unilateral Primary Osteoarthritis, Right Hip, osteoarthritis right knee, lumbar arthrosis    Plan: This patient does have symptomatic hip osteoarthritis, knee arthrosis, and likely lumbar arthrosis. We did discuss the general management of osteoarthritis, which is largely nonsurgical.   We discussed that activity modification, weight loss, weight bearing exercises, physical therapy, over the counter medications, prescription medications, ambulatory aids, intra-articular injections, and pain management modalities are the treatment of choice. Only once these fail, would we consider surgery. The patient stated their understanding of the pathology, as well as their choices. We decided to proceed with diclofenac. Procedures:  none      Follow up will be two weeks    HPI: This is a 63 y.o. female who complains of right hip pain, right knee pain which is activity dependent. The patient has had activity dependent pain for years. The patient has tried activity modification, no physical therapy, injections have failed in the knee, never tried in the hip. The pain is in the groin lower leg, medial knee, it is severe at times in intensity. The pain is in the groin and causes some limitation in the normal activities of daily living. The patient denies any numbness, weakness, tingling, fevers, chills, nausea, vomiting, fevers, chills, nausea, vomiting. Past Medical History:   Diagnosis Date    Chronic pain     Diabetes mellitus ([address] 6/19/2009    Diverticulosis 2009    colonoscopy .  Dr. Inderjit Szymanski    GERD (gastroesophageal reflux disease)     Headache     HTN (hypertension) 6/19/2009    Hypercholesterolemia 6/19/2009    Osteoarthritis 6/19/2009    Osteoporosis        Past Surgical History:   Procedure Laterality Date    COLONOSCOPY N/A 11/10/2020    COLONOSCOPY performed by Stevan Brannon MD at [facility] 11/10/2020         HX DILATION AND CURETTAGE         Current Outpatient Medications on File Prior to Visit   Medication Sig Dispense Refill    losartan (COZAAR) 100 mg tablet Take 1 Tablet by mouth daily. 90 Tablet 2    butalbital-acetaminophen-caffeine (FIORICET, ESGIC) -40 mg per tablet TAKE 1 TABLET BY MOUTH EVERY 4 HOURS AS NEEDED 30 Tablet 0    hydroCHLOROthiazide (HYDRODIURIL) 25 mg tablet One qd 90 Tablet 3    rosuvastatin (CRESTOR) 20 mg tablet Take 1 Tablet by mouth daily. 90 Tablet 3    metFORMIN (GLUCOPHAGE) 1,000 mg tablet Take 1 Tablet by mouth daily. 90 Tablet 3    glucose blood VI test strips (ASCENSIA AUTODISC VI, ONE TOUCH ULTRA TEST VI) strip One Touch Ultra 2 meter Check blood sugars twice daily 200 Strip 3    aspirin delayed-release 81 mg tablet Take 1 Tab by mouth daily. 90 Tab 3    brimonidine (ALPHAGAN) 0.2 % ophthalmic solution INSTILL 1 DROP IN EACH EYE TWICE A DAY      Blood-Glucose Meter monitoring kit Check fsbs every day 250.02 1 Kit 0    lancets misc Check fsbs every day 250.02 1 Each 11    hydrocortisone (HYTONE) 2.5 % topical cream Apply  to affected area two (2) times a day. use thin layer 30 g 0    Lancets misc One Touch Ultra 2 meter. Check blood sugars daily. ICD-10 E11.620 100 Each 11    timolol (TIMOPTIC) 0.5 % ophthalmic solution Administer 1 Drop to both eyes daily. 10 mL 4    ondansetron (ZOFRAN ODT) 4 mg disintegrating tablet Take 1 Tab by mouth every eight (8) hours as needed for Nausea. (Patient not taking: Reported on 8/30/2022) 20 Tab 0     No current facility-administered medications on file prior to visit.        No Known Allergies    Family History   Problem Relation Age of Onset    Cancer Other         lung    Diabetes Other     Thyroid Disease Other     Hypertension Mother     Diabetes Mother     Kidney Disease Mother     Diabetes Father     Cancer Father         Lung Cancer    Diabetes Brother     Heart Failure Maternal Grandmother     Diabetes Maternal Grandmother     No Known Problems Maternal Grandfather     No Known Problems Paternal Grandmother     No Known Problems Paternal Grandfather     Diabetes Brother         \"Borderline\"    Alzheimer's Disease Paternal Aunt        Social History     Socioeconomic History    Marital status: SINGLE   Tobacco Use    Smoking status: Never    Smokeless tobacco: Never   Substance and Sexual Activity    Alcohol use: No    Drug use: Never     Social Determinants of Health     Financial Resource Strain: Low Risk     Difficulty of Paying Living Expenses: Not hard at all   Food Insecurity: No Food Insecurity    Worried About Running Out of Food in the Last Year: Never true    Ran Out of Food in the Last Year: Never true         Review of Systems:       General: Denies headache, lethargy, fever, weight loss  Ears/Nose/Throat: Denies ear discharge, drainage, nosebleeds, hoarse voice, dental problems  Cardiovascular: Denies chest pain, shortness of breath  Lungs: Denies chest pain, breathing problems, wheezing, pneumonia  Stomach: Denies stomach pain, heartburn, constipation, irritable bowel  Skin: Denies rash, sores, open wounds  Musculoskeletal: Right hip pain  Genitourinary: Denies dysuria, hematuria, polyuria  Gastrointestinal: Denies constipation, obstipation, diarrhea  Neurological: Denies changes in sight, smell, hearing, taste, seizures. Denies loss of consciousness.   Psychiatric: Denies depression, sleep pattern changes, anxiety, change in personality  Endocrine: Denies mood swings, heat or cold intolerance  Hematologic/Lymphatic: Denies anemia, purpura, petechia  Allergic/Immunologic: Denies swelling of throat, pain or swelling at lymph nodes      Physical Examination:      General: AOX3, no apparent distress  Psychiatric: mood and affect appropriate  Lungs: clear to auscultation bilaterally  Heart: regular rate and rhythm  Abdomen: no guarding  Head: normocephalic, atraumatic  Skin: No significant abnormalities, good turgor  Sensation intact to light touch: L1-S1 dermatomes  Muscular exam: 5/5 strength in all major muscle groups unless noted in specialty exam.    Extremities      Left upper extremity: Full active and passive range of motion without pain, deformity, no open wound, strength 5/5 in all major muscle groups. Right upper extremity: Full active and passive range of motion without pain, deformity, no open wound, strength 5/5 in all major muscle groups. Left lower extremity: Full active and passive range of motion without pain, deformity, no open wound, strength 5/5 in all major muscle groups. Right lower extremity:  No deformity is noted. Circumduction of the hip causes pain in the groin, reproductive of the chief complaint. Range of motion is limited, with a + impingement sign. CRAIG test is negative. Gait is antalgic.  minimal tenderness to palpation on the lateral aspect of the hip. Sensation is intact to light touch in the L1-S1 dermatomes. Skin is intact about the hip. Hip flexion and abduction strength is 5/5, hip extension and knee extension as well as tibialis anterior and EHL/GCS are 5/5 strength. Knee range of motion 0-120, medial joint ttp. Diagnostics:    Pertinent Xrays: Xrays are ordered and reviewed by myself, available of the right hip, they indicate moderate right hip and right knee osteoarthritis of the femoroacetabular joint, no significant other findings, no other osseus abnormalities, fractures, or dislocations. Lumbar arthrosis noted. Ms. Santino Fiore has a reminder for a \"due or due soon\" health maintenance. I have asked that she contact her primary care provider for follow-up on this health maintenance.

## 2022-08-30 NOTE — PROGRESS NOTES
Identified pt with two pt identifiers (name and ). Reviewed chart in preparation for visit and have obtained necessary documentation. Mable Clement is a 63 y.o. female  Chief Complaint   Patient presents with    Knee Pain    Hip Pain     RT Knee, RT Hip     Visit Vitals  /66 (BP 1 Location: Right arm, BP Patient Position: Sitting, BP Cuff Size: Large adult)   Pulse 68   Temp 96.9 °F (36.1 °C) (Tympanic)   Ht 5' 1\" (1.549 m)   Wt 163 lb (73.9 kg)   LMP 2010   SpO2 100%   BMI 30.80 kg/m²     1. Have you been to the ER, urgent care clinic since your last visit? Hospitalized since your last visit? No    2. Have you seen or consulted any other health care providers outside of the [address] [place] [address] since your last visit? Include any pap smears or colon screening.  No

## 2022-09-01 ENCOUNTER — TELEPHONE (OUTPATIENT)
Dept: ORTHOPEDIC SURGERY | Age: 62
End: 2022-09-01

## 2022-09-01 NOTE — TELEPHONE ENCOUNTER
Patient would like to know if she can take her prescribed 81 mg baby Asprin along side her recently prescribed diclofenac 75 mg daily. She would also like to know if she is safe to take a 500 mg tablet of extra strength tylenol with the diclofenac when she is having a headache.

## 2022-09-13 ENCOUNTER — OFFICE VISIT (OUTPATIENT)
Dept: ORTHOPEDIC SURGERY | Age: 62
End: 2022-09-13
Payer: COMMERCIAL

## 2022-09-13 VITALS
OXYGEN SATURATION: 100 % | RESPIRATION RATE: 18 BRPM | WEIGHT: 164 LBS | BODY MASS INDEX: 30.96 KG/M2 | SYSTOLIC BLOOD PRESSURE: 123 MMHG | TEMPERATURE: 97 F | HEART RATE: 71 BPM | HEIGHT: 61 IN | DIASTOLIC BLOOD PRESSURE: 83 MMHG

## 2022-09-13 DIAGNOSIS — M17.11 UNILATERAL PRIMARY OSTEOARTHRITIS, RIGHT KNEE: Primary | ICD-10-CM

## 2022-09-13 DIAGNOSIS — M16.11 UNILATERAL PRIMARY OSTEOARTHRITIS, RIGHT HIP: ICD-10-CM

## 2022-09-13 PROCEDURE — 99214 OFFICE O/P EST MOD 30 MIN: CPT | Performed by: ORTHOPAEDIC SURGERY

## 2022-09-13 RX ORDER — DICLOFENAC SODIUM 75 MG/1
75 TABLET, DELAYED RELEASE ORAL 2 TIMES DAILY
Qty: 60 TABLET | Refills: 4 | Status: SHIPPED | OUTPATIENT
Start: 2022-09-13

## 2022-09-13 RX ORDER — DICLOFENAC SODIUM 10 MG/G
4 GEL TOPICAL 4 TIMES DAILY
Qty: 1 EACH | Refills: 3 | Status: SHIPPED | OUTPATIENT
Start: 2022-09-13

## 2022-09-13 NOTE — PROGRESS NOTES
9/13/2022      CC: bilateral knee pain    HPI:      This is a 63y.o. year old female who presents for a follow up visit. The patient was last seen and diagnosed with bilateral knee osteoarthritis. The patient's treatments since the most recent visit have comprised of diclofenac. The patient has had good relief of the chief complaint. PMH:  Past Medical History:   Diagnosis Date    Chronic pain     Diabetes mellitus ([address] 6/19/2009    Diverticulosis 2009    colonoscopy . Dr. Malena Bowers    GERD (gastroesophageal reflux disease)     Headache     HTN (hypertension) 6/19/2009    Hypercholesterolemia 6/19/2009    Osteoarthritis 6/19/2009    Osteoporosis        PSxHx:  Past Surgical History:   Procedure Laterality Date    COLONOSCOPY N/A 11/10/2020    COLONOSCOPY performed by Mikki Hart MD at [facility] ENDOSCOPY    COLONOSCOPY,DIAGNOSTIC  11/10/2020         HX DILATION AND CURETTAGE         Meds:    Current Outpatient Medications:     diclofenac (VOLTAREN) 1 % gel, Apply 4 g to affected area four (4) times daily. , Disp: 1 Each, Rfl: 3    diclofenac EC (VOLTAREN) 75 mg EC tablet, Take 1 Tablet by mouth two (2) times a day., Disp: 60 Tablet, Rfl: 4    losartan (COZAAR) 100 mg tablet, Take 1 Tablet by mouth daily. , Disp: 90 Tablet, Rfl: 2    butalbital-acetaminophen-caffeine (FIORICET, ESGIC) -40 mg per tablet, TAKE 1 TABLET BY MOUTH EVERY 4 HOURS AS NEEDED, Disp: 30 Tablet, Rfl: 0    hydroCHLOROthiazide (HYDRODIURIL) 25 mg tablet, One qd, Disp: 90 Tablet, Rfl: 3    rosuvastatin (CRESTOR) 20 mg tablet, Take 1 Tablet by mouth daily. , Disp: 90 Tablet, Rfl: 3    metFORMIN (GLUCOPHAGE) 1,000 mg tablet, Take 1 Tablet by mouth daily. , Disp: 90 Tablet, Rfl: 3    glucose blood VI test strips (ASCENSIA AUTODISC VI, ONE TOUCH ULTRA TEST VI) strip, One Touch Ultra 2 meter Check blood sugars twice daily, Disp: 200 Strip, Rfl: 3    aspirin delayed-release 81 mg tablet, Take 1 Tab by mouth daily. , Disp: 90 Tab, Rfl: 3 brimonidine (ALPHAGAN) 0.2 % ophthalmic solution, INSTILL 1 DROP IN [facility] EYE TWICE A DAY, Disp: , Rfl:     ondansetron (ZOFRAN ODT) 4 mg disintegrating tablet, Take 1 Tab by mouth every eight (8) hours as needed for Nausea. (Patient not taking: Reported on 8/30/2022), Disp: 20 Tab, Rfl: 0    Blood-Glucose Meter monitoring kit, Check fsbs every day 250.02, Disp: 1 Kit, Rfl: 0    lancets misc, Check fsbs every day 250.02, Disp: 1 Each, Rfl: 11    hydrocortisone (HYTONE) 2.5 % topical cream, Apply  to affected area two (2) times a day. use thin layer, Disp: 30 g, Rfl: 0    Lancets misc, One Touch Ultra 2 meter. Check blood sugars daily. ICD-10 E11.620, Disp: 100 Each, Rfl: 11    timolol (TIMOPTIC) 0.5 % ophthalmic solution, Administer 1 Drop to both eyes daily. , Disp: 10 mL, Rfl: 4    All:  No Known Allergies    Social Hx:  Social History     Socioeconomic History    Marital status: SINGLE   Tobacco Use    Smoking status: Never    Smokeless tobacco: Never   Substance and Sexual Activity    Alcohol use: No    Drug use: Never     Social Determinants of Health     Financial Resource Strain: Low Risk     Difficulty of Paying Living Expenses: Not hard at all   Food Insecurity: No Food Insecurity    Worried About Running Out of Food in the Last Year: Never true    Ran Out of Food in the Last Year: Never true       Family Hx:  Family History   Problem Relation Age of Onset    Cancer Other         lung    Diabetes Other     Thyroid Disease Other     Hypertension Mother     Diabetes Mother     Kidney Disease Mother     Diabetes Father     Cancer Father         Lung Cancer    Diabetes Brother     Heart Failure Maternal Grandmother     Diabetes Maternal Grandmother     No Known Problems Maternal Grandfather     No Known Problems Paternal Grandmother     No Known Problems Paternal Grandfather     Diabetes Brother         \"Borderline\"    Alzheimer's Disease Paternal Aunt          Review of Systems:       General: Denies headache, lethargy, fever, weight loss  Ears/Nose/Throat: Denies ear discharge, drainage, nosebleeds, hoarse voice, dental problems  Cardiovascular: Denies chest pain, shortness of breath  Lungs: Denies chest pain, breathing problems, wheezing, pneumonia  Stomach: Denies stomach pain, heartburn, constipation, irritable bowel  Skin: Denies rash, sores, open wounds  Musculoskeletal: bilateral knee pain  Genitourinary: Denies dysuria, hematuria, polyuria  Gastrointestinal: Denies constipation, obstipation, diarrhea  Neurological: Denies changes in sight, smell, hearing, taste, seizures. Denies loss of consciousness. Psychiatric: Denies depression, sleep pattern changes, anxiety, change in personality  Endocrine: Denies mood swings, heat or cold intolerance  Hematologic/Lymphatic: Denies anemia, purpura, petechia  Allergic/Immunologic: Denies swelling of throat, pain or swelling at lymph nodes      Physical Examination:    Visit Vitals  /83 (BP 1 Location: Left upper arm, BP Patient Position: Sitting, BP Cuff Size: Adult)   Pulse 71   Temp 97 °F (36.1 °C) (Temporal)   Resp 18   Ht 5' 1\" (1.549 m)   Wt 164 lb (74.4 kg)   SpO2 100%   BMI 30.99 kg/m²        General: AOX3, no apparent distress  Psychiatric: mood and affect appropriate  Lungs: breathing is symmetric and unlabored bilaterally  Heart: regular rate and rhythm  Abdomen: no guarding  Head: normocephalic, atraumatic  Skin: No significant abnormalities, good turgor  Sensation intact to light touch: C5-T1 dermatomes  Muscular exam: 5/5 strength in all major muscle groups unless noted in specialty exam.    Extremities        Left lower extremity:  Knee is noted to have a mild effusion. Minimal medial joint line tenderness to palpation with a negative deformity. Patellar crepitus with range of motion is noted. Range of motion is 0-125. Sensation is intact to light touch L1-S1 dermatomes.   Knee flexion and extension strength is 5/5 with Tibialis anterior, EHL, FHL being 5/5 as well. No other gross deformity or deficit is noted. Right lower extremity: Knee is noted to have a mild effusion. Medial joint line tenderness to palpation with a negative deformity. Patellar crepitus with range of motion is noted. Range of motion is 0-125. Sensation is intact to light touch L1-S1 dermatomes. Knee flexion and extension strength is 5/5 with Tibialis anterior, EHL, FHL being 5/5 as well. No other gross deformity or deficit is noted. Diagnostics:    Pertinent Diagnostics: none today    Assessment: Bilateral knee Osteoarthritis  Plan: This patient I had a long discussion regarding treatment options. We went over the nonoperative options, continued medications, injections of multiple types, bracing, physical therapy, maintenance of ideal body weight. Patient has elected to proceed with diclofenac gel and pills, prescriptions managed. Follow up with pcp [id number] for labs. Ms. Peter Degroot has a reminder for a \"due or due soon\" health maintenance. I have asked that she contact her primary care provider for follow-up on this health maintenance.

## 2022-09-13 NOTE — PROGRESS NOTES
Dodie Katz is a 63 y.o. female  Chief Complaint   Patient presents with    Knee Pain     No pain at this time     1. Have you been to the ER, no urgent care clinic since your last visit? no Hospitalized since your last visit? no    2. Have you seen or consulted any other health care providers outside of the [address] [place] [address] since your last visit?  no Include any pap smears or colon screening.  no

## 2022-09-13 NOTE — LETTER
9/13/2022    Patient: Laneta Schlatter   YOB: 1960   Date of Visit: 9/13/2022     Alvina Medrano MD  [address]  [facility]  [place]    Dear Alvina Medrano MD,      Thank you for referring Ms. Ericka Perla to [facility] for evaluation. My notes for this consultation are attached. If you have questions, please do not hesitate to call me. I look forward to following your patient along with you.       Sincerely,    Mary Jane Castro, DO

## 2022-10-07 ENCOUNTER — TELEPHONE (OUTPATIENT)
Dept: INTERNAL MEDICINE CLINIC | Age: 62
End: 2022-10-07

## 2022-10-14 ENCOUNTER — OFFICE VISIT (OUTPATIENT)
Dept: INTERNAL MEDICINE CLINIC | Age: 62
End: 2022-10-14
Payer: COMMERCIAL

## 2022-10-14 ENCOUNTER — TELEPHONE (OUTPATIENT)
Dept: INTERNAL MEDICINE CLINIC | Age: 62
End: 2022-10-14

## 2022-10-14 VITALS
SYSTOLIC BLOOD PRESSURE: 122 MMHG | OXYGEN SATURATION: 99 % | WEIGHT: 161 LBS | RESPIRATION RATE: 16 BRPM | DIASTOLIC BLOOD PRESSURE: 60 MMHG | HEIGHT: 61 IN | BODY MASS INDEX: 30.4 KG/M2 | TEMPERATURE: 96.7 F | HEART RATE: 78 BPM

## 2022-10-14 DIAGNOSIS — H61.21 EXCESSIVE EAR WAX, RIGHT: Primary | ICD-10-CM

## 2022-10-14 PROCEDURE — 99213 OFFICE O/P EST LOW 20 MIN: CPT | Performed by: INTERNAL MEDICINE

## 2022-10-14 NOTE — PROGRESS NOTES
HISTORY OF PRESENT ILLNESS  Apolinar Terry is a 63 y.o. female. HPI  C/o right ear clogged sensation and decreased hearing right ear  No pain  Taking otc decongestant allergy med which helps      Patient Active Problem List    Diagnosis Date Noted    Peripheral neuropathy 01/30/2012    Bronchitis 06/19/2009    Diabetes mellitus ([facility] [address] 06/19/2009    Diabetic gastroparesis ([facility] [address] 06/19/2009    HTN (hypertension) 06/19/2009    Hypercholesterolemia 06/19/2009    Osteoarthritis 06/19/2009     Current Outpatient Medications   Medication Sig Dispense Refill    diclofenac (VOLTAREN) 1 % gel Apply 4 g to affected area four (4) times daily. 1 Each 3    diclofenac EC (VOLTAREN) 75 mg EC tablet Take 1 Tablet by mouth two (2) times a day. 60 Tablet 4    losartan (COZAAR) 100 mg tablet Take 1 Tablet by mouth daily. 90 Tablet 2    butalbital-acetaminophen-caffeine (FIORICET, ESGIC) -40 mg per tablet TAKE 1 TABLET BY MOUTH EVERY 4 HOURS AS NEEDED 30 Tablet 0    hydroCHLOROthiazide (HYDRODIURIL) 25 mg tablet One qd 90 Tablet 3    rosuvastatin (CRESTOR) 20 mg tablet Take 1 Tablet by mouth daily. 90 Tablet 3    metFORMIN (GLUCOPHAGE) 1,000 mg tablet Take 1 Tablet by mouth daily. 90 Tablet 3    glucose blood VI test strips (ASCENSIA AUTODISC VI, ONE TOUCH ULTRA TEST VI) strip One Touch Ultra 2 meter Check blood sugars twice daily 200 Strip 3    aspirin delayed-release 81 mg tablet Take 1 Tab by mouth daily. 90 Tab 3    brimonidine (ALPHAGAN) 0.2 % ophthalmic solution INSTILL 1 DROP IN EACH EYE TWICE A DAY      lancets misc Check fsbs every day 250.02 1 Each 11    Lancets misc One Touch Ultra 2 meter. Check blood sugars daily. ICD-10 E11.620 100 Each 11    timolol (TIMOPTIC) 0.5 % ophthalmic solution Administer 1 Drop to both eyes daily. 10 mL 4    Blood-Glucose Meter monitoring kit Check fsbs every day 250.02 1 Kit 0    hydrocortisone (HYTONE) 2.5 % topical cream Apply  to affected area two (2) times a day.  use thin layer 30 g 0     No Known Allergies        ROS    Physical Exam  Vitals and nursing note reviewed. Constitutional:       Appearance: Normal appearance. She is well-developed. Comments: Appears stated age   HENT:      Ears:      Comments: Right ear wax impaction  Cardiovascular:      Rate and Rhythm: Normal rate and regular rhythm. Heart sounds: Normal heart sounds. No murmur heard. No friction rub. No gallop. Pulmonary:      Effort: Pulmonary effort is normal. No respiratory distress. Breath sounds: Normal breath sounds. No wheezing. Abdominal:      General: Bowel sounds are normal.      Palpations: Abdomen is soft. Neurological:      Mental Status: She is alert. ASSESSMENT and PLAN    ICD-10-CM ICD-9-CM    1. Excessive ear wax, right  H61.21 380.4 Removed was with ear irrigation and curette. Pt tolerated well .  No complications

## 2022-10-14 NOTE — PATIENT INSTRUCTIONS
Office Policies    Phone calls/patient messages:            Please allow up to 24 hours for someone in the office to contact you about your call or message. Be mindful your provider may be out of the office or your message may require further review. We encourage you to use [employer] for your messages as this is a faster, more efficient way to communicate with our office                         Medication Refills:            Prescription medications require 48-72 business hours to process. We encourage you to use [employer] for your refills. For controlled medications: Please allow 72 business hours to process. Certain medications may require you to  a written prescription at our office. NO narcotic/controlled medications will be prescribed after 4pm Monday through Friday or on weekends              Form/Paperwork Completion:            Please note a $25 fee may incur for all paperwork for completed by our providers. We ask that you allow 7-10 business days. Pre-payment is due prior to picking up/faxing the completed form. You may also download your forms to [employer] to have your doctor print off.

## 2022-10-14 NOTE — TELEPHONE ENCOUNTER
[phone]    Patient requested appt for inability to hear in one ear, right, feels clogged or blocked. Pt scheduled acute with dr Bryon Page. Pt mentioned had some intermittent cough, then when advised cough symptoms are not allowed to be scheduled as in office visits, pt stated she did NOT have a cough, only the ear issue and needs ear looked at. Please be advised. Call pt if any changes need to be made.
Patient accepted an appointment for 12:30 today.
Principal Discharge DX:	Diarrhea, unspecified type

## 2022-10-25 ENCOUNTER — OFFICE VISIT (OUTPATIENT)
Dept: SLEEP MEDICINE | Age: 62
End: 2022-10-25
Payer: COMMERCIAL

## 2022-10-25 VITALS
HEIGHT: 61 IN | RESPIRATION RATE: 18 BRPM | HEART RATE: 67 BPM | WEIGHT: 161 LBS | DIASTOLIC BLOOD PRESSURE: 71 MMHG | OXYGEN SATURATION: 97 % | TEMPERATURE: 97.7 F | BODY MASS INDEX: 30.4 KG/M2 | SYSTOLIC BLOOD PRESSURE: 126 MMHG

## 2022-10-25 DIAGNOSIS — G47.33 OBSTRUCTIVE SLEEP APNEA (ADULT) (PEDIATRIC): ICD-10-CM

## 2022-10-25 DIAGNOSIS — G47.00 INSOMNIA, UNSPECIFIED TYPE: Primary | ICD-10-CM

## 2022-10-25 DIAGNOSIS — E66.9 OBESITY, CLASS I, BMI 30-34.9: ICD-10-CM

## 2022-10-25 PROCEDURE — 3078F DIAST BP <80 MM HG: CPT | Performed by: INTERNAL MEDICINE

## 2022-10-25 PROCEDURE — 99204 OFFICE O/P NEW MOD 45 MIN: CPT | Performed by: INTERNAL MEDICINE

## 2022-10-25 PROCEDURE — 3074F SYST BP LT 130 MM HG: CPT | Performed by: INTERNAL MEDICINE

## 2022-10-25 NOTE — Clinical Note
Thank you for the referral.  I will keep you informed of her progress.  [address], [place]

## 2022-10-25 NOTE — PROGRESS NOTES
217 [facility] [place]. [place], [address]  Tel.  [phone]  Fax. 100 [facility] 60  [place], [address] [facility]  Tel.  [phone]  Fax. [phone] 9250 [facility] Rebekah Cunningham   Tel.  [phone]  Fax. [phone]         Subjective:      Tianna Merritt is an 63 y.o. female referred for evaluation for a sleep disorder. She complains of difficulty falling asleep associated with she feels tired during the day. Symptoms began several years ago, gradually worsening since that time. She usually can fall asleep in 2 hours. She lives alone. She denies falling asleep while at work, driving. Tianna Merritt does wake up frequently at night. She is bothered by waking up too early and left unable to get back to sleep. She actually sleeps about 5 hours at night and wakes up about 4 times during the night. She does work shifts: Second Shift. Mynor Swenson indicates she does get too little sleep at night. Her bedtime is 1200. She awakens at 1000. She does not take naps. . She has the following observed behaviors: Twitching of legs or feet (Vivid dreams);  . 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  She has a lot of stressors (financial, death in family, frustration that she has not pursued a career in her field of education yet has student loans). She often thinks about her stressors at night. She sleeps with the tv on all night  Cecil Sleepiness Score: 9        No Known Allergies      Current Outpatient Medications:     diclofenac (VOLTAREN) 1 % gel, Apply 4 g to affected area four (4) times daily. , Disp: 1 Each, Rfl: 3    diclofenac EC (VOLTAREN) 75 mg EC tablet, Take 1 Tablet by mouth two (2) times a day., Disp: 60 Tablet, Rfl: 4    losartan (COZAAR) 100 mg tablet, Take 1 Tablet by mouth daily. , Disp: 90 Tablet, Rfl: 2    butalbital-acetaminophen-caffeine (FIORICET, ESGIC) -40 mg per tablet, TAKE 1 TABLET BY MOUTH EVERY 4 HOURS AS NEEDED, Disp: 30 Tablet, Rfl: 0    hydroCHLOROthiazide (HYDRODIURIL) 25 mg tablet, One qd, Disp: 90 Tablet, Rfl: 3    rosuvastatin (CRESTOR) 20 mg tablet, Take 1 Tablet by mouth daily. , Disp: 90 Tablet, Rfl: 3    metFORMIN (GLUCOPHAGE) 1,000 mg tablet, Take 1 Tablet by mouth daily. , Disp: 90 Tablet, Rfl: 3    glucose blood VI test strips (ASCENSIA AUTODISC VI, ONE TOUCH ULTRA TEST VI) strip, One Touch Ultra 2 meter Check blood sugars twice daily, Disp: 200 Strip, Rfl: 3    aspirin delayed-release 81 mg tablet, Take 1 Tab by mouth daily. , Disp: 90 Tab, Rfl: 3    brimonidine (ALPHAGAN) 0.2 % ophthalmic solution, INSTILL 1 DROP IN EACH EYE TWICE A DAY, Disp: , Rfl:     Blood-Glucose Meter monitoring kit, Check fsbs every day 250.02, Disp: 1 Kit, Rfl: 0    lancets misc, Check fsbs every day 250.02, Disp: 1 Each, Rfl: 11    hydrocortisone (HYTONE) 2.5 % topical cream, Apply  to affected area two (2) times a day. use thin layer, Disp: 30 g, Rfl: 0    Lancets misc, One Touch Ultra 2 meter. Check blood sugars daily. ICD-10 E11.620, Disp: 100 Each, Rfl: 11    timolol (TIMOPTIC) 0.5 % ophthalmic solution, Administer 1 Drop to both eyes daily. , Disp: 10 mL, Rfl: 4     She  has a past medical history of Chronic pain, Diabetes mellitus ([facility] [address] (6/19/2009), Diverticulosis (2009), GERD (gastroesophageal reflux disease), Headache, HTN (hypertension) (6/19/2009), Hypercholesterolemia (6/19/2009), Osteoarthritis (6/19/2009), and Osteoporosis. She  has a past surgical history that includes hx dilation and curettage; colonoscopy,diagnostic (11/10/2020); and colonoscopy (N/A, 11/10/2020). She family history includes Alzheimer's Disease in her paternal aunt;  Cancer in her father and another family member; Diabetes in her brother, brother, father, maternal grandmother, mother, and another family member; Heart Failure in her maternal grandmother; Hypertension in her mother; Kidney Disease in her mother; No Known Problems in her maternal grandfather, paternal grandfather, and paternal grandmother; Thyroid Disease in an other family member. She  reports that she has never smoked. She has never used smokeless tobacco. She reports that she does not drink alcohol and does not use drugs. Review of Systems:  Constitutional:  No significant weight loss or weight gain. Eyes:  No blurred vision. CVS:  No significant chest pain  Pulm:  No significant shortness of breath  GI:  No significant nausea or vomiting  :  + significant nocturia  Musculoskeletal:  No significant joint pain at night  Skin:  No significant rashes  Neuro:  No significant dizziness   Psych:  some depression, a lot of stressors    Sleep Review of Systems: notable for + difficulty falling asleep; +frequent awakenings at night;  regular dreaming noted; no nightmares ; some early morning headaches; mild intermittent memory problems; no concentration issues; no history of any automobile or occupational accidents due to daytime drowsiness. Objective:   Visit Vitals  /71 (BP 1 Location: Right upper arm, BP Patient Position: Sitting, BP Cuff Size: Adult)   Pulse 67   Temp 97.7 °F (36.5 °C) (Temporal)   Resp 18   Ht 5' 1\" (1.549 m)   Wt 161 lb (73 kg)   LMP 11/29/2010   SpO2 97%   BMI 30.42 kg/m²         General:   Not in acute distress   Eyes:  Anicteric sclerae, no obvious strabismus   Nose:  No obvious nasal septum deviation    Oropharynx:   Class 3 oropharyngeal outlet, thick tongue base,  low-lying soft palate, narrow tonsilo-pharyngeal pilars   Tonsils:   tonsils are present and normal   Neck:    ; midline trachea   Chest/Lungs:  Equal lung expansion, clear on auscultation    CVS:  Normal rate, regular rhythm; no JVD   Skin:  Warm to touch; no obvious rashes   Neuro:  No focal deficits ; no obvious tremor    Psych:  Normal affect,  normal countenance;          Assessment:       ICD-10-CM ICD-9-CM    1.  Insomnia, unspecified type G47.00 780.52       2. Obstructive sleep apnea (adult) (pediatric)  G47.33 327.23 SLEEP STUDY UNATTENDED, 4 CHANNEL      3. Obesity, Class I, BMI 30-34.9  E66.9 278.00             Plan:     Insomnia/inadequate sleep hygiene- she is spending too much time in bed. Her new bedtime will be 2 am and she will set alarm and get out of bed at 9:30 am. I have advised a regular sleep wake cycle. she  was advised to avoid looking at the clock during the night. Ideally, the clock face should be turned away. she was advised to minimize caffeine use and to avoid caffeine-containing beverages 8 hours prior to bedtime. Naps were discouraged. A regular exercise schedule, at least 3 hours before bedtime, would be beneficial to improving sleep quality. Watching TV, using laptops, tablets and smartphones in the evening was discouraged. she  was advised to keep the bedroom cool and dark. At risk for DANIA- * The patient currently has a Moderate Risk for having sleep apnea. STOP-BANG score 3.  * HSAT was ordered for initial evaluation. Treatment options for sleep apnea were reviewed. she would like to proceed with a trial of PAP if found to have significant apnea. * She was provided information on sleep apnea including coresponding risk factors and the importance of proper treatment. * Counseling was provided regarding proper sleep hygiene and safe driving. 3. Obesity - weight has been stable. I have discussed the relationship of weight to obstructive sleep apnea. I have advised her to start a weight loss plan. We discussed reducing portions and avoiding beverages with calories. Exercise can further assist with weight loss/maintenance and was recommended. she understands that weight loss can reduce severity of sleep apnea and snoring. The treatment plan was reviewed with the patient in detail .  she understands that the lead technologist will be calling her  with the results or notifying of results via [address] [place] [address] and assisting with the next step in the treatment plan as outlined today during the consultation with me. All of her questions were addressed. Thank you for allowing us to participate in your patient's medical care. We'll keep you updated on these investigations.   Electronically signed by    Porfirio Bennett MD  Diplomate in Sleep Medicine  [facility]  10/25/2022

## 2022-10-25 NOTE — PATIENT INSTRUCTIONS
217 [facility] [place]. [place], [address]  Tel.  [phone]  Fax. 100 [facility] 60  [place], [address] [facility]  Tel.  [phone]  Fax. [phone] [address]250 Rebekah Sierra  Tel.  [phone]  Fax. [phone]     Sleep Apnea: After Your Visit  Your Care Instructions  Sleep apnea occurs when you frequently stop breathing for 10 seconds or longer during sleep. It can be mild to severe, based on the number of times per hour that you stop breathing or have slowed breathing. Blocked or narrowed airways in your nose, mouth, or throat can cause sleep apnea. Your airway can become blocked when your throat muscles and tongue relax during sleep. Sleep apnea is common, occurring in 1 out of 20 individuals. Individuals having any of the following characteristics should be evaluated and treated right away due to high risk and detrimental consequences from untreated sleep apnea:  Obesity  Congestive Heart failure  Atrial Fibrillation  Uncontrolled Hypertension  Type II Diabetes  Night-time Arrhythmias  Stroke  Pulmonary Hypertension  High-risk Driving Populations (pilots, truck drivers, etc.)  Patients Considering Weight-loss Surgery    How do you know you have sleep apnea? You probably have sleep apnea if you answer 'yes' to 3 or more of the following questions:  S - Have you been told that you Snore? T - Are you often Tired during the day? O - Has anyone Observed you stop breathing while sleeping? P- Do you have (or are being treated for) high blood Pressure? B - Are you obese (Body Mass Index > 35)? A - Is your Age 48years old or older? N - Is your Neck size greater than 16 inches? G - Are you male Gender? A sleep physician can prescribe a breathing device that prevents tissues in the throat from blocking your airway. Or your doctor may recommend using a dental device (oral breathing device) to help keep your airway open.  In some cases, surgery may be needed to remove enlarged tissues in the throat. Follow-up care is a key part of your treatment and safety. Be sure to make and go to all appointments, and call your doctor if you are having problems. It's also a good idea to know your test results and keep a list of the medicines you take. How can you care for yourself at home? Lose weight, if needed. It may reduce the number of times you stop breathing or have slowed breathing. Go to bed at the same time every night. Sleep on your side. It may stop mild apnea. If you tend to roll onto your back, sew a pocket in the back of your [employer] top. Put a tennis ball into the pocket, and stitch the pocket shut. This will help keep you from sleeping on your back. Avoid alcohol and medicines such as sleeping pills and sedatives before bed. Do not smoke. Smoking can make sleep apnea worse. If you need help quitting, talk to your doctor about stop-smoking programs and medicines. These can increase your chances of quitting for good. Prop up the head of your bed 4 to 6 inches by putting bricks under the legs of the bed. Treat breathing problems, such as a stuffy nose, caused by a cold or allergies. Use a continuous positive airway pressure (CPAP) breathing machine if lifestyle changes do not help your apnea and your doctor recommends it. The machine keeps your airway from closing when you sleep. If CPAP does not help you, ask your doctor whether you should try other breathing machines. A bilevel positive airway pressure machine has two types of air pressureâone for breathing in and one for breathing out. Another device raises or lowers air pressure as needed while you breathe. If your nose feels dry or bleeds when using one of these machines, talk with your doctor about increasing moisture in the air. A humidifier may help.   If your nose is runny or stuffy from using a breathing machine, talk with your doctor about using decongestants or a corticosteroid nasal spray.  When should you call for help? Watch closely for changes in your health, and be sure to contact your doctor if:  You still have sleep apnea even though you have made lifestyle changes. You are thinking of trying a device such as CPAP. You are having problems using a CPAP or similar machine. Where can you learn more? Go to "[employer]be. Enter X922 in the search box to learn more about \"Sleep Apnea: After Your Visit. \"   © 4231-2683 Healthwise, Incorporated. Care instructions adapted under license by [employer] (which disclaims liability or warranty for this information). This care instruction is for use with your licensed healthcare professional. If you have questions about a medical condition or this instruction, always ask your healthcare professional. Krishna Flower any warranty or liability for your use of this information. PROPER SLEEP HYGIENE    What to avoid  Do not have drinks with caffeine, such as coffee or black tea, for 8 hours before bed. Do not smoke or use other types of tobacco near bedtime. Nicotine is a stimulant and can keep you awake. Avoid drinking alcohol late in the evening, because it can cause you to wake in the middle of the night. Do not eat a big meal close to bedtime. If you are hungry, eat a light snack. Do not drink a lot of water close to bedtime, because the need to urinate may wake you up during the night. Do not read or watch TV in bed. Use the bed only for sleeping and sexual activity. What to try  Go to bed at the same time every night, and wake up at the same time every morning. Do not take naps during the day. Keep your bedroom quiet, dark, and cool. Get regular exercise, but not within 3 to 4 hours of your bedtime. .  Sleep on a comfortable pillow and mattress. If watching the clock makes you anxious, turn it facing away from you so you cannot see the time.   If you worry when you lie down, start a worry book. Well before bedtime, write down your worries, and then set the book and your concerns aside. Try meditation or other relaxation techniques before you go to bed. If you cannot fall asleep, get up and go to another room until you feel sleepy. Do something relaxing. Repeat your bedtime routine before you go to bed again. Make your house quiet and calm about an hour before bedtime. Turn down the lights, turn off the TV, log off the computer, and turn down the volume on music. This can help you relax after a busy day. Drowsy Driving  The [address] [place] [address] Road Traffic Safety Administration cites drowsiness as a causing factor in more than 464,462 police reported crashes annually, resulting in 76,000 injuries and 1,500 deaths. Other surveys suggest 55% of people polled have driven while drowsy in the past year, 23% had fallen asleep but not crashed, 3% crashed, and 2% had and accident due to drowsy driving. Who is at risk? Young Drivers: One study of drowsy driving accidents states that 55% of the drivers were under 25 years. Of those, 75% were male. Shift Workers and Travelers: People who work overnight or travel across time zones frequently are at higher risk of experiencing Circadian Rhythm Disorders. They are trying to work and function when their body is programed to sleep. Sleep Deprived: Lack of sleep has a serious impact on your ability to pay attention or focus on a task. Consistently getting less than the average of 8 hours your body needs creates partial or cumulative sleep deprivation. Untreated Sleep Disorders: Sleep Apnea, Narcolepsy, R.L.S., and other sleep disorders (untreated) prevent a person from getting enough restful sleep. This leads to excessive daytime sleepiness and increases the risk for drowsy driving accidents by up to 7 times. Medications / Alcohol: Even over the counter medications can cause drowsiness.  Medications that impair a drivers attention should have a warning label. Alcohol naturally makes you sleepy and on its own can cause accidents. Combined with excessive drowsiness its effects are amplified. Signs of Drowsy Driving:   * You don't remember driving the last few miles   * You may drift out of your jaren   * You are unable to focus and your thoughts wander   * You may yawn more often than normal   * You have difficulty keeping your eyes open / nodding off   * Missing traffic signs, speeding, or tailgating  Prevention-   Good sleep hygiene, lifestyle and behavioral choices have the most impact on drowsy driving. There is no substitute for sleep and the average person requires 8 hours nightly. If you find yourself driving drowsy, stop and sleep. Consider the sleep hygiene tips provided during your visit as well. Medication Refill Policy: Refills for all medications require 1 week advance notice. Please have your pharmacy fax a refill request. We are unable to fax, or call in \"controled substance\" medications and you will need to pick these prescriptions up from our office. [employer] Activation    Thank you for requesting access to [employer]. Please follow the instructions below to securely access and download your online medical record. [employer] allows you to send messages to your doctor, view your test results, renew your prescriptions, schedule appointments, and more. How Do I Sign Up? In your internet browser, go to [email or web address]. Click on the First Time User? Click Here link in the Sign In box. You will see the New Member Sign Up page. Enter your [employer] Access Code exactly as it appears below. You will not need to use this code after youve completed the sign-up process. If you do not sign up before the expiration date, you must request a new code.     [employer] Access Code: [email or web address]  Expires: 2022 10:55 AM (This is the date your [employer] access code will )    Enter the last four digits of your Social Security Number (xxxx) and Date of Birth (mm/dd/yyyy) as indicated and click Submit. You will be taken to the next sign-up page. Create a [employer] ID. This will be your [employer] login ID and cannot be changed, so think of one that is secure and easy to remember. Create a [employer] password. You can change your password at any time. Enter your Password Reset Question and Answer. This can be used at a later time if you forget your password. Enter your e-mail address. You will receive e-mail notification when new information is available in [address] Click Sign Up. You can now view and download portions of your medical record. Click the [employer] link to download a portable copy of your medical information. Additional Information    If you have questions, please call [phone]. Remember, [employer] is NOT to be used for urgent needs. For medical emergencies, dial 911.

## 2022-11-02 ENCOUNTER — TELEPHONE (OUTPATIENT)
Dept: ORTHOPEDIC SURGERY | Age: 62
End: 2022-11-02

## 2022-11-02 NOTE — TELEPHONE ENCOUNTER
Spoke with pt in regards to her diclofenac and she is going to continue taking it as it is helping with some of her knee pain.

## 2022-11-02 NOTE — TELEPHONE ENCOUNTER
Patient has several questions about her prescribed diclofenac EC 75 mg. She would know if this medication is an anti-inflammatory and if it is used to treat whole body arthritis.

## 2022-11-16 ENCOUNTER — TELEPHONE (OUTPATIENT)
Dept: INTERNAL MEDICINE CLINIC | Age: 62
End: 2022-11-16

## 2022-11-16 DIAGNOSIS — R05.1 ACUTE COUGH: Primary | ICD-10-CM

## 2022-11-16 RX ORDER — AZITHROMYCIN 250 MG/1
250 TABLET, FILM COATED ORAL SEE ADMIN INSTRUCTIONS
Qty: 6 TABLET | Refills: 0 | Status: SHIPPED | OUTPATIENT
Start: 2022-11-16 | End: 2022-11-21

## 2022-11-16 NOTE — TELEPHONE ENCOUNTER
Patient called in stating she believes she has a cold or the flu and would like an antibitic called in if possible. She states  has prescribed her medication for this in the past.    Symptoms consists of cough, sneezing and congestion.

## 2022-11-16 NOTE — TELEPHONE ENCOUNTER
Called, spoke to pt. Two pt identifiers confirmed. Patient informed per Dr. Wan Seen. See message below. Tell pt I escribed a zpak but she should get a home covid test.. if having and fever also get a flu test at [facility]. Patient verbalized understanding of information discussed w/ no further questions at this time.          Marcelina Claude, LPN

## 2022-11-17 ENCOUNTER — TELEPHONE (OUTPATIENT)
Dept: INTERNAL MEDICINE CLINIC | Age: 62
End: 2022-11-17

## 2022-11-17 RX ORDER — AMOXICILLIN 500 MG/1
500 CAPSULE ORAL 3 TIMES DAILY
Qty: 21 CAPSULE | Refills: 0 | Status: SHIPPED | OUTPATIENT
Start: 2022-11-17

## 2022-11-17 NOTE — TELEPHONE ENCOUNTER
----- Message from Elise Wynn sent at 11/16/2022  3:52 PM EST -----  Subject: Message to Provider    QUESTIONS  Information for Provider? Patient has a sore throat, cough and sneezing. She is returning to work tomorrow and will be tested for covid again then. The last covid test a week ago was negative. Can the doctor call in   medication? Please advise.  ---------------------------------------------------------------------------  --------------  Rosette WHEELER  [phone]; OK to leave message on voicemail  ---------------------------------------------------------------------------  --------------  SCRIPT ANSWERS  Relationship to Patient?  Self

## 2022-12-05 ENCOUNTER — TELEPHONE (OUTPATIENT)
Dept: ORTHOPEDIC SURGERY | Age: 62
End: 2022-12-05

## 2022-12-05 NOTE — TELEPHONE ENCOUNTER
Pt stated that she uses her diclofenac gel and takes the diclofenac pills as well and she states that's not helping much and wants to know what she should do now, if she can take any other medication with or without the diclofenac? She would also like to know what her chances are of her leg giving out on her? Should she start preparing for hip surgery?

## 2022-12-05 NOTE — TELEPHONE ENCOUNTER
Patient would like to know if she can take Tylenol arthritis with diclofenac and if she requests another pain medication would she need to stop taking her prescribed diclofenac and the Tylenol

## 2022-12-07 ENCOUNTER — TELEPHONE (OUTPATIENT)
Dept: ORTHOPEDIC SURGERY | Age: 62
End: 2022-12-07

## 2022-12-07 ENCOUNTER — OFFICE VISIT (OUTPATIENT)
Dept: ORTHOPEDIC SURGERY | Age: 62
End: 2022-12-07
Payer: COMMERCIAL

## 2022-12-07 VITALS
DIASTOLIC BLOOD PRESSURE: 68 MMHG | SYSTOLIC BLOOD PRESSURE: 114 MMHG | WEIGHT: 161.8 LBS | OXYGEN SATURATION: 100 % | HEIGHT: 61 IN | HEART RATE: 71 BPM | RESPIRATION RATE: 16 BRPM | BODY MASS INDEX: 30.55 KG/M2 | TEMPERATURE: 98.9 F

## 2022-12-07 DIAGNOSIS — M16.11 UNILATERAL PRIMARY OSTEOARTHRITIS, RIGHT HIP: Primary | ICD-10-CM

## 2022-12-07 DIAGNOSIS — M17.11 UNILATERAL PRIMARY OSTEOARTHRITIS, RIGHT KNEE: Primary | ICD-10-CM

## 2022-12-07 RX ORDER — ESOMEPRAZOLE MAGNESIUM 40 MG/1
CAPSULE, DELAYED RELEASE ORAL
COMMUNITY

## 2022-12-07 RX ORDER — MELOXICAM 15 MG/1
1 TABLET ORAL DAILY
COMMUNITY
Start: 2022-07-05 | End: 2022-12-07 | Stop reason: ALTCHOICE

## 2022-12-07 RX ORDER — SIMVASTATIN 40 MG/1
TABLET, FILM COATED ORAL
COMMUNITY

## 2022-12-07 RX ORDER — DICLOFENAC SODIUM 75 MG/1
75 TABLET, DELAYED RELEASE ORAL 2 TIMES DAILY
Qty: 60 TABLET | Refills: 4 | Status: SHIPPED | OUTPATIENT
Start: 2022-12-07

## 2022-12-07 RX ORDER — MECLIZINE HYDROCHLORIDE 25 MG/1
TABLET ORAL
COMMUNITY
Start: 2022-10-13

## 2022-12-07 RX ORDER — TRAMADOL HYDROCHLORIDE 50 MG/1
50 TABLET ORAL
Qty: 28 TABLET | Refills: 0 | Status: SHIPPED | OUTPATIENT
Start: 2022-12-07 | End: 2022-12-14

## 2022-12-07 RX ORDER — VALSARTAN 160 MG/1
160 TABLET ORAL DAILY
COMMUNITY
Start: 2022-03-20

## 2022-12-07 NOTE — PROGRESS NOTES
12/7/2022    Chief Complaint: Right hip pain    Assessment: Unilateral primary osteoarthritis of the right hip  (M16.11)     Plan: This patient and I did have a long discussion regarding the treatment options. Nonoperative management was discussed at length, continued pain control, physical therapy, injections, ambulatory aids, and weight loss. I did speak with the patient specifically that there are always some nonoperative options, and that surgery would be elective on their part. We did discuss the risks of surgery which include but are not limited to infection, nerve or blood vessel damage, femoral, lateral femoral cutaneous nerve injury, sciatic nerve injury, failure of fixation, failure of any possible implant, need for reoperation, postoperative pain and swelling, intra-or postoperative fracture, postoperative dislocation, leg length inequality, need for reoperation, implant failure, death, disability, organ dysfunction, wound healing issues, DVT, PE, and the need for further procedures. The patient did freely state their understanding and satisfaction with our discussion. We will proceed with a total hip arthroplasty after  medical clearances. The patient was counseled at length about the risks of niharika Covid-19 during their perioperative period and any recovery window from their procedure. The patient was made aware that niharika Covid-19  may worsen their prognosis for recovering from their procedure and lend to a higher morbidity and/or mortality risk. All material risks, benefits, and reasonable alternatives including postponing the procedure were discussed. The patient does  wish to proceed with the procedure at this time. HPI: This is a 63 y. o.female who complains of right hip pain which is activity dependent. The patient has had activity dependent pain for years. The patient has tried activity modification, no physical therapy, injections have not been attempted.   The pain is in the groin , it is severe in intensity. The patient fears falling, walks with a limp, and feels as though all nonoperative management has failed. The patient denies any numbness, weakness, tingling, fevers, chills, nausea, vomiting, fevers, chills, nausea, vomiting. Past Medical History:   Diagnosis Date    Chronic pain     Diabetes mellitus ([address] 6/19/2009    Diverticulosis 2009    colonoscopy . Dr. Arturo Jackson    GERD (gastroesophageal reflux disease)     Headache     HTN (hypertension) 6/19/2009    Hypercholesterolemia 6/19/2009    Osteoarthritis 6/19/2009    Osteoporosis        Past Surgical History:   Procedure Laterality Date    COLONOSCOPY N/A 11/10/2020    COLONOSCOPY performed by Jen [facility]was MD at [facility] ENDOSCOPY    COLONOSCOPY,DIAGNOSTIC  11/10/2020         HX DILATION AND CURETTAGE         Current Outpatient Medications on File Prior to Visit   Medication Sig Dispense Refill    valsartan (DIOVAN) 160 mg tablet Take 160 mg by mouth daily. meclizine (ANTIVERT) 25 mg tablet TAKE 1 TABLET BY MOUTH THREE (3) TIMES DAILY AS NEEDED FOR DIZZINESS FOR UP TO 10 DAYS. simvastatin (ZOCOR) 40 mg tablet       esomeprazole (NEXIUM) 40 mg capsule       amoxicillin (AMOXIL) 500 mg capsule Take 1 Capsule by mouth three (3) times daily. 21 Capsule 0    diclofenac (VOLTAREN) 1 % gel Apply 4 g to affected area four (4) times daily. 1 Each 3    losartan (COZAAR) 100 mg tablet Take 1 Tablet by mouth daily. 90 Tablet 2    butalbital-acetaminophen-caffeine (FIORICET, ESGIC) -40 mg per tablet TAKE 1 TABLET BY MOUTH EVERY 4 HOURS AS NEEDED 30 Tablet 0    hydroCHLOROthiazide (HYDRODIURIL) 25 mg tablet One qd 90 Tablet 3    rosuvastatin (CRESTOR) 20 mg tablet Take 1 Tablet by mouth daily. 90 Tablet 3    metFORMIN (GLUCOPHAGE) 1,000 mg tablet Take 1 Tablet by mouth daily.  90 Tablet 3    glucose blood VI test strips (ASCENSIA AUTODISC VI, ONE TOUCH ULTRA TEST VI) strip One Touch Ultra 2 meter Check blood sugars twice daily 200 Strip 3    aspirin delayed-release 81 mg tablet Take 1 Tab by mouth daily. 90 Tab 3    brimonidine (ALPHAGAN) 0.2 % ophthalmic solution INSTILL 1 DROP IN EACH EYE TWICE A DAY      Blood-Glucose Meter monitoring kit Check fsbs every day 250.02 1 Kit 0    lancets misc Check fsbs every day 250.02 1 Each 11    hydrocortisone (HYTONE) 2.5 % topical cream Apply  to affected area two (2) times a day. use thin layer 30 g 0    Lancets misc One Touch Ultra 2 meter. Check blood sugars daily. ICD-10 E11.620 100 Each 11    timolol (TIMOPTIC) 0.5 % ophthalmic solution Administer 1 Drop to both eyes daily. 10 mL 4     No current facility-administered medications on file prior to visit.        No Known Allergies    Family History   Problem Relation Age of Onset    Cancer Other         lung    Diabetes Other     Thyroid Disease Other     Hypertension Mother     Diabetes Mother     Kidney Disease Mother     Diabetes Father     Cancer Father         Lung Cancer    Diabetes Brother     Heart Failure Maternal Grandmother     Diabetes Maternal Grandmother     No Known Problems Maternal Grandfather     No Known Problems Paternal Grandmother     No Known Problems Paternal Grandfather     Diabetes Brother         \"Borderline\"    Alzheimer's Disease Paternal Aunt        Social History     Socioeconomic History    Marital status: SINGLE   Tobacco Use    Smoking status: Never    Smokeless tobacco: Never   Substance and Sexual Activity    Alcohol use: No    Drug use: Never     Social Determinants of Health     Financial Resource Strain: Low Risk     Difficulty of Paying Living Expenses: Not hard at all   Food Insecurity: No Food Insecurity    Worried About Running Out of Food in the Last Year: Never true    Ran Out of Food in the Last Year: Never true         Review of Systems:       General: Denies headache, lethargy, fever, weight loss  Ears/Nose/Throat: Denies ear discharge, drainage, nosebleeds, hoarse voice, dental problems  Cardiovascular: Denies chest pain, shortness of breath  Lungs: Denies chest pain, breathing problems, wheezing, pneumonia  Stomach: Denies stomach pain, heartburn, constipation, irritable bowel  Skin: Denies rash, sores, open wounds  Musculoskeletal: Admits to right hip pain  Genitourinary: Denies dysuria, hematuria, polyuria  Gastrointestinal: Denies constipation, obstipation, diarrhea  Neurological: Denies changes in sight, smell, hearing, taste, seizures. Denies loss of consciousness. Psychiatric: Denies depression, sleep pattern changes, anxiety, change in personality  Endocrine: Denies mood swings, heat or cold intolerance  Hematologic/Lymphatic: Denies anemia, purpura, petechia  Allergic/Immunologic: Denies swelling of throat, pain or swelling at lymph nodes      Physical Examination:      General: AOX3, no apparent distress  Psychiatric: mood and affect appropriate  Lungs: clear to auscultation bilaterally  Heart: regular rate and rhythm  Abdomen: no guarding  Head: normocephalic, atraumatic  Skin: No significant abnormalities, good turgor  Sensation intact to light touch: L1-S1 dermatomes  Muscular exam: 5/5 strength in all major muscle groups unless noted in specialty exam.    Extremities      Left upper extremity: Full active and passive range of motion without pain, deformity, no open wound, strength 5/5 in all major muscle groups. Right upper extremity: Full active and passive range of motion without pain, deformity, no open wound, strength 5/5 in all major muscle groups. Left lower extremity: Full active and passive range of motion without pain, deformity, no open wound, strength 5/5 in all major muscle groups. Right lower extremity:  No deformity is noted. Circumduction of the hip causes significant pain in the groin, reproductive of the chief complaint. Range of motion is limited, with a positive impingement sign. CRAIG test causes some pain in the groin. Gait is antalgic. Slight tenderness to palpation on the lateral aspect of the hip. Sensation is intact to light touch in the L1-S1 dermatomes. Skin is intact about the hip. Hip flexion and abduction strength is 4+/5, hip extension and knee extension as well as tibialis anterior and EHL/GCS are 5/5 strength. Diagnostics:    Pertinent Xrays:  Pelvis and hip xrays indicate severe osteoarthritis of the right hip. There are osteophytes at the femoral neck and head, as well as subchondral sclerosis of the acetabular rim. Ms. Kaila Andrews has a reminder for a \"due or due soon\" health maintenance. I have asked that she contact her primary care provider for follow-up on this health maintenance.

## 2022-12-07 NOTE — TELEPHONE ENCOUNTER
Patient would like to know if she will be receiving a prescription for Tramadol in addition to her already prescribed Diclofenac and if so can she take them together.  The patient's preferred pharmacy is [facility] # [id number]

## 2022-12-12 ENCOUNTER — TELEPHONE (OUTPATIENT)
Dept: ORTHOPEDIC SURGERY | Age: 62
End: 2022-12-12

## 2022-12-12 NOTE — TELEPHONE ENCOUNTER
ELISEOM for pt to call back to schedule her surgery.        ----- Message from Liat Toure DO sent at 12/7/2022  2:25 PM EST -----  Diagnosis: Unilateral Primary Osteoarthritis, right hip (M16.1)    Procedure: Right total hip arthroplasty  CPT: 17188  Operative minutes: 100  Inpatient  Location: [facility] Main OR  PAT: Yes  Class: Yes  Special Equipment: C arm (12 inch), HANA table, Direct Anterior total Hip, Corea Accolade II  Staffing: Assistant/retractor goode  Anesthesia: spinal

## 2022-12-13 ENCOUNTER — TELEPHONE (OUTPATIENT)
Dept: ORTHOPEDIC SURGERY | Age: 62
End: 2022-12-13

## 2022-12-13 NOTE — TELEPHONE ENCOUNTER
Patient called and stated she has chosen to move forward with a cortisone injection prior to scheduling surgery stating she needs to get financially ready for surgery. Patient will notify us when she is ready to move forward with surgery.

## 2022-12-14 ENCOUNTER — OFFICE VISIT (OUTPATIENT)
Dept: ORTHOPEDIC SURGERY | Age: 62
End: 2022-12-14
Payer: COMMERCIAL

## 2022-12-14 VITALS
HEIGHT: 61 IN | WEIGHT: 160 LBS | TEMPERATURE: 96.8 F | DIASTOLIC BLOOD PRESSURE: 66 MMHG | SYSTOLIC BLOOD PRESSURE: 120 MMHG | HEART RATE: 72 BPM | BODY MASS INDEX: 30.21 KG/M2 | OXYGEN SATURATION: 100 %

## 2022-12-14 DIAGNOSIS — M16.11 UNILATERAL PRIMARY OSTEOARTHRITIS, RIGHT HIP: ICD-10-CM

## 2022-12-14 DIAGNOSIS — M17.11 UNILATERAL PRIMARY OSTEOARTHRITIS, RIGHT KNEE: Primary | ICD-10-CM

## 2022-12-14 RX ORDER — BETAMETHASONE SODIUM PHOSPHATE AND BETAMETHASONE ACETATE 3; 3 MG/ML; MG/ML
6 INJECTION, SUSPENSION INTRA-ARTICULAR; INTRALESIONAL; INTRAMUSCULAR; SOFT TISSUE ONCE
Status: COMPLETED | OUTPATIENT
Start: 2022-12-14 | End: 2022-12-14

## 2022-12-14 RX ADMIN — BETAMETHASONE SODIUM PHOSPHATE AND BETAMETHASONE ACETATE 6 MG: 3; 3 INJECTION, SUSPENSION INTRA-ARTICULAR; INTRALESIONAL; INTRAMUSCULAR; SOFT TISSUE at 11:45

## 2022-12-14 NOTE — PROGRESS NOTES
12/14/2022      CC: right hip pain    HPI:      This is a 63y.o. year old female who presents for a follow up visit. The patient was last seen and diagnosed with right hip osteoarthritis. The patient's treatments since the most recent visit have comprised of plans for surgery, which she delayed for financial reasons. The patient has had no relief of the chief complaint. PMH:  Past Medical History:   Diagnosis Date    Chronic pain     Diabetes mellitus ([address] 6/19/2009    Diverticulosis 2009    colonoscopy . Dr. Arturo Jackson    GERD (gastroesophageal reflux disease)     Headache     HTN (hypertension) 6/19/2009    Hypercholesterolemia 6/19/2009    Osteoarthritis 6/19/2009    Osteoporosis        PSxHx:  Past Surgical History:   Procedure Laterality Date    COLONOSCOPY N/A 11/10/2020    COLONOSCOPY performed by Jen Biswas MD at [facility] ENDOSCOPY    COLONOSCOPY,DIAGNOSTIC  11/10/2020         HX DILATION AND CURETTAGE         Meds:    Current Outpatient Medications:     valsartan (DIOVAN) 160 mg tablet, Take 160 mg by mouth daily. , Disp: , Rfl:     meclizine (ANTIVERT) 25 mg tablet, TAKE 1 TABLET BY MOUTH THREE (3) TIMES DAILY AS NEEDED FOR DIZZINESS FOR UP TO 10 DAYS., Disp: , Rfl:     simvastatin (ZOCOR) 40 mg tablet, , Disp: , Rfl:     esomeprazole (NEXIUM) 40 mg capsule, , Disp: , Rfl:     diclofenac EC (VOLTAREN) 75 mg EC tablet, Take 1 Tablet by mouth two (2) times a day., Disp: 60 Tablet, Rfl: 4    traMADoL (ULTRAM) 50 mg tablet, Take 1 Tablet by mouth every six (6) hours as needed for Pain for up to 7 days. Max Daily Amount: 200 mg., Disp: 28 Tablet, Rfl: 0    amoxicillin (AMOXIL) 500 mg capsule, Take 1 Capsule by mouth three (3) times daily. , Disp: 21 Capsule, Rfl: 0    diclofenac (VOLTAREN) 1 % gel, Apply 4 g to affected area four (4) times daily. , Disp: 1 Each, Rfl: 3    losartan (COZAAR) 100 mg tablet, Take 1 Tablet by mouth daily. , Disp: 90 Tablet, Rfl: 2    butalbital-acetaminophen-caffeine (FIORICET, ESGIC) -40 mg per tablet, TAKE 1 TABLET BY MOUTH EVERY 4 HOURS AS NEEDED, Disp: 30 Tablet, Rfl: 0    hydroCHLOROthiazide (HYDRODIURIL) 25 mg tablet, One qd, Disp: 90 Tablet, Rfl: 3    rosuvastatin (CRESTOR) 20 mg tablet, Take 1 Tablet by mouth daily. , Disp: 90 Tablet, Rfl: 3    metFORMIN (GLUCOPHAGE) 1,000 mg tablet, Take 1 Tablet by mouth daily. , Disp: 90 Tablet, Rfl: 3    glucose blood VI test strips (ASCENSIA AUTODISC VI, ONE TOUCH ULTRA TEST VI) strip, One Touch Ultra 2 meter Check blood sugars twice daily, Disp: 200 Strip, Rfl: 3    aspirin delayed-release 81 mg tablet, Take 1 Tab by mouth daily. , Disp: 90 Tab, Rfl: 3    brimonidine (ALPHAGAN) 0.2 % ophthalmic solution, INSTILL 1 DROP IN EACH EYE TWICE A DAY, Disp: , Rfl:     Blood-Glucose Meter monitoring kit, Check fsbs every day 250.02, Disp: 1 Kit, Rfl: 0    lancets misc, Check fsbs every day 250.02, Disp: 1 Each, Rfl: 11    hydrocortisone (HYTONE) 2.5 % topical cream, Apply  to affected area two (2) times a day. use thin layer, Disp: 30 g, Rfl: 0    Lancets misc, One Touch Ultra 2 meter. Check blood sugars daily. ICD-10 E11.620, Disp: 100 Each, Rfl: 11    timolol (TIMOPTIC) 0.5 % ophthalmic solution, Administer 1 Drop to both eyes daily. , Disp: 10 mL, Rfl: 4  No current facility-administered medications for this visit.     All:  No Known Allergies    Social Hx:  Social History     Socioeconomic History    Marital status: SINGLE   Tobacco Use    Smoking status: Never    Smokeless tobacco: Never   Substance and Sexual Activity    Alcohol use: No    Drug use: Never     Social Determinants of Health     Financial Resource Strain: Low Risk     Difficulty of Paying Living Expenses: Not hard at all   Food Insecurity: No Food Insecurity    Worried About Running Out of Food in the Last Year: Never true    Ran Out of Food in the Last Year: Never true       Family Hx:  Family History   Problem Relation Age of Onset    Cancer Other         lung    Diabetes Other Thyroid Disease Other     Hypertension Mother     Diabetes Mother     Kidney Disease Mother     Diabetes Father     Cancer Father         Lung Cancer    Diabetes Brother     Heart Failure Maternal Grandmother     Diabetes Maternal Grandmother     No Known Problems Maternal Grandfather     No Known Problems Paternal Grandmother     No Known Problems Paternal Grandfather     Diabetes Brother         \"Borderline\"    Alzheimer's Disease Paternal Aunt          Review of Systems:       General: Denies headache, lethargy, fever, weight loss  Ears/Nose/Throat: Denies ear discharge, drainage, nosebleeds, hoarse voice, dental problems  Cardiovascular: Denies chest pain, shortness of breath  Lungs: Denies chest pain, breathing problems, wheezing, pneumonia  Stomach: Denies stomach pain, heartburn, constipation, irritable bowel  Skin: Denies rash, sores, open wounds  Musculoskeletal: right hip pain  Genitourinary: Denies dysuria, hematuria, polyuria  Gastrointestinal: Denies constipation, obstipation, diarrhea  Neurological: Denies changes in sight, smell, hearing, taste, seizures. Denies loss of consciousness.   Psychiatric: Denies depression, sleep pattern changes, anxiety, change in personality  Endocrine: Denies mood swings, heat or cold intolerance  Hematologic/Lymphatic: Denies anemia, purpura, petechia  Allergic/Immunologic: Denies swelling of throat, pain or swelling at lymph nodes      Physical Examination:    Visit Vitals  /66 (BP 1 Location: Right arm, BP Patient Position: Sitting, BP Cuff Size: Large adult)   Pulse 72   Temp 96.8 °F (36 °C) (Tympanic)   Ht 5' 1\" (1.549 m)   Wt 160 lb (72.6 kg)   SpO2 100%   BMI 30.23 kg/m²        General: AOX3, no apparent distress  Psychiatric: mood and affect appropriate  Lungs: breathing is symmetric and unlabored bilaterally  Heart: regular rate and rhythm  Abdomen: no guarding  Head: normocephalic, atraumatic  Skin: No significant abnormalities, good turgor  Sensation intact to light touch: C5-T1 dermatomes  Muscular exam: 5/5 strength in all major muscle groups unless noted in specialty exam.    Extremities        left lower extremity:  No gross deformity. No restriction to range of motion of the hip, knee, ankle. Muscle bulk is appropriate without wasting. Sensation is intact to light touch in the L1-S1 dermatomes. Capillary refill is less than 2 seconds in the fingers. Strength testing is 5/5 at the major muscle groups of the hip, knee, ankle. right lower extremity: No deformity is noted. Circumduction of the hip causes significant pain in the groin, reproductive of the chief complaint. Range of motion is limited, with a positive impingement sign. Gait is antalgic. Sensation is intact to light touch in the L1-S1 dermatomes. Skin is intact about the hip. Hip flexion and abduction strength is 4+/5, hip extension and knee extension as well as tibialis anterior and EHL/GCS are 5/5 strength. Diagnostics:    Pertinent Diagnostics: none today    Assessment: right hip Osteoarthritis  Plan: This patient I had a long discussion regarding treatment options. We went over the nonoperative options, continued medications, injections, physical therapy, maintenance of ideal body weight. Patient has elected to proceed with injection today, follow up for surgery. Ms. Juanjose Castro has a reminder for a \"due or due soon\" health maintenance. I have asked that she contact her primary care provider for follow-up on this health maintenance. PROCEDURE NOTE:    After consent was obtained, the right hip was visualized under direct ultrasound guidance, utilizing a Sonosite C1-4 probe with 3-5 Hz variable frequency oriented in a longitudinal orientation. Once I had confirmation of direct visualization of the head neck junction, skin at the anterior lateral hip was prepped with chlorhexidine.   Under direct visualization, 1% lidocaine was injected into the subcutaneous tissues as well as into the capsule of the hip. Needle remained in place 6 mg of betamethasone as well as 1% lidocaine were injected into the hip joint itself, there was good filling of the capsule itself as noted by direct visualization. Images were saved to the local Sonosite machine harddrive. Once this was completed, the needle was extracted, sterile dressing was applied. The patient tolerated well. Postinjection instructions were given.

## 2022-12-14 NOTE — PROGRESS NOTES
Identified pt with two pt identifiers (name and ). Reviewed chart in preparation for visit and have obtained necessary documentation. Elian Raza is a 63 y.o. female  Chief Complaint   Patient presents with    Joint Or Tendon Injection     RT Hip     Visit Vitals  /66 (BP 1 Location: Right arm, BP Patient Position: Sitting, BP Cuff Size: Large adult)   Pulse 72   Temp 96.8 °F (36 °C) (Tympanic)   Ht 5' 1\" (1.549 m)   Wt 160 lb (72.6 kg)   LMP 2010   SpO2 100%   BMI 30.23 kg/m²     1. Have you been to the ER, urgent care clinic since your last visit? Hospitalized since your last visit? No    2. Have you seen or consulted any other health care providers outside of the [address] [place] [address] since your last visit? Include any pap smears or colon screening.  No

## 2022-12-27 ENCOUNTER — TELEPHONE (OUTPATIENT)
Dept: ORTHOPEDIC SURGERY | Age: 62
End: 2022-12-27

## 2022-12-27 ENCOUNTER — OFFICE VISIT (OUTPATIENT)
Dept: SLEEP MEDICINE | Age: 62
End: 2022-12-27

## 2022-12-27 DIAGNOSIS — G47.33 OSA (OBSTRUCTIVE SLEEP APNEA): Primary | ICD-10-CM

## 2022-12-27 NOTE — TELEPHONE ENCOUNTER
Patient would like know if she can take her prescribed diclofenac, tramadol and extra strength tylenol or aleve safely in one day? Patient would like to take the aleve and extra strength tylenol on occassions for head aches. EMS/Patient

## 2022-12-27 NOTE — PROGRESS NOTES
[address] [facility] [place], [address]  Tel.  [phone]  Fax. [address] [facility] [address]  [place], [address] [facility]  Tel.  [phone]  Fax. [phone] [address] [facility] [address]   Tel.  [phone]  Fax. [phone]       S>Shital Garrido is a 63 y.o. female seen today to receive a home sleep testing unit (HST). Patient was educated on proper hookup and operation of the HST. Instruction forms and documentation were reviewed and signed. The patient demonstrated good understanding of the HST. O>    Visit Vitals  LMP 11/29/2010         A>  No diagnosis found. P>  General information regarding operations and maintenance of the device was provided. She was provided information on sleep apnea including coresponding risk factors and the importance of proper treatment. Follow-up appointment was made to return the HST. She will be contacted once the results have been reviewed. She was asked to contact our office for any problems regarding her home sleep test study.      #[phone]

## 2022-12-28 ENCOUNTER — OFFICE VISIT (OUTPATIENT)
Dept: SLEEP MEDICINE | Age: 62
End: 2022-12-28

## 2022-12-28 DIAGNOSIS — G47.33 OSA (OBSTRUCTIVE SLEEP APNEA): Primary | ICD-10-CM

## 2022-12-28 NOTE — TELEPHONE ENCOUNTER
LVM to inform pt that she can take the diclofenac and tramadol if she feels it is helping. She may take an extra strength tylenol as needed for headaches.

## 2023-01-03 ENCOUNTER — TELEPHONE (OUTPATIENT)
Dept: SLEEP MEDICINE | Age: 63
End: 2023-01-03

## 2023-01-03 DIAGNOSIS — G47.33 OBSTRUCTIVE SLEEP APNEA (ADULT) (PEDIATRIC): Primary | ICD-10-CM

## 2023-01-04 NOTE — TELEPHONE ENCOUNTER
Failed HSAT. Repeat HSAT.   Inform patient and schedule     Short monitoring time (about 3 hours)    If she prefers an in-lab test we can see if her insurance will authorize, otherwise hsat order attached

## 2023-01-05 ENCOUNTER — DOCUMENTATION ONLY (OUTPATIENT)
Dept: SLEEP MEDICINE | Age: 63
End: 2023-01-05

## 2023-01-05 DIAGNOSIS — M16.11 UNILATERAL PRIMARY OSTEOARTHRITIS, RIGHT HIP: ICD-10-CM

## 2023-01-05 RX ORDER — TRAMADOL HYDROCHLORIDE 50 MG/1
50 TABLET ORAL
Qty: 28 TABLET | Refills: 0 | Status: SHIPPED | OUTPATIENT
Start: 2023-01-05 | End: 2023-01-12

## 2023-01-27 DIAGNOSIS — M16.11 UNILATERAL PRIMARY OSTEOARTHRITIS, RIGHT HIP: ICD-10-CM

## 2023-01-27 RX ORDER — TRAMADOL HYDROCHLORIDE 50 MG/1
50 TABLET ORAL
Qty: 28 TABLET | Refills: 0 | Status: SHIPPED | OUTPATIENT
Start: 2023-01-27 | End: 2023-02-03

## 2023-02-14 ENCOUNTER — TELEPHONE (OUTPATIENT)
Dept: ORTHOPEDIC SURGERY | Age: 63
End: 2023-02-14

## 2023-02-14 DIAGNOSIS — M16.11 UNILATERAL PRIMARY OSTEOARTHRITIS, RIGHT HIP: ICD-10-CM

## 2023-02-14 NOTE — TELEPHONE ENCOUNTER
Patient is requesting a return phone call to get scheduled for surgery. She is hoping to be scheduled for the end of March 2023.

## 2023-02-15 RX ORDER — TRAMADOL HYDROCHLORIDE 50 MG/1
50 TABLET ORAL
Qty: 28 TABLET | Refills: 0 | Status: SHIPPED | OUTPATIENT
Start: 2023-02-15 | End: 2023-02-22

## 2023-02-27 ENCOUNTER — TELEPHONE (OUTPATIENT)
Dept: ORTHOPEDIC SURGERY | Age: 63
End: 2023-02-27

## 2023-02-27 RX ORDER — LOSARTAN POTASSIUM 100 MG/1
100 TABLET ORAL DAILY
Qty: 90 TABLET | Refills: 2 | Status: SHIPPED | OUTPATIENT
Start: 2023-02-27

## 2023-02-27 NOTE — TELEPHONE ENCOUNTER
PCP: James Bueno MD    Last appt: 10/14/2022  No future appointments. Requested Prescriptions     Pending Prescriptions Disp Refills    losartan (COZAAR) 100 mg tablet 90 Tablet 2     Sig: Take 1 Tablet by mouth daily.

## 2023-02-27 NOTE — TELEPHONE ENCOUNTER
Patient returned a missed call from [facility] about scheduling surgery and would like a return phone call after 3:00 pm at [phone]. Patient is aware that  a return phone call can take up to a week.

## 2023-03-02 NOTE — TELEPHONE ENCOUNTER
LVM returning pt's call informing her that I was calling back to schedule surgery and for her to please call so we can get her scheduled.

## 2023-03-06 ENCOUNTER — TELEPHONE (OUTPATIENT)
Dept: ORTHOPEDIC SURGERY | Age: 63
End: 2023-03-06

## 2023-03-06 DIAGNOSIS — M16.11 UNILATERAL PRIMARY OSTEOARTHRITIS, RIGHT HIP: ICD-10-CM

## 2023-03-06 RX ORDER — TRAMADOL HYDROCHLORIDE 50 MG/1
50 TABLET ORAL
Qty: 28 TABLET | Refills: 0 | Status: SHIPPED | OUTPATIENT
Start: 2023-03-06 | End: 2023-03-13

## 2023-03-06 NOTE — TELEPHONE ENCOUNTER
Patient returned [address] [facility] phone call about getting scheduled for surgery and would like a return phone call today, Monday March 6, 2023 or Tuesday, March 7, 2023. Patient will be off both days.

## 2023-03-07 ENCOUNTER — TELEPHONE (OUTPATIENT)
Dept: INTERNAL MEDICINE CLINIC | Age: 63
End: 2023-03-07

## 2023-03-07 NOTE — TELEPHONE ENCOUNTER
----- Message from Mark Rodriguez sent at 3/7/2023  9:21 AM EST -----  Subject: Appointment Request    Reason for Call: Established Patient Appointment needed: Routine Pre-Op    QUESTIONS    Reason for appointment request? Available appointments did not meet   patient need     Additional Information for Provider?  Patient is having surgery on 4/10   right hip Dr. Jayna Bishop needs to be seem prior   ---------------------------------------------------------------------------  --------------  [address]  [phone]; OK to leave message on voicemail  ---------------------------------------------------------------------------  --------------  SCRIPT ANSWERS  COVID Screen: Иван Sanchez

## 2023-03-13 ENCOUNTER — TELEPHONE (OUTPATIENT)
Dept: ORTHOPEDIC SURGERY | Age: 63
End: 2023-03-13

## 2023-03-13 NOTE — TELEPHONE ENCOUNTER
Patient is wanting to get confirmation that the anesthesiologist who will be working with Dr. Sophia Wilson during her surgery on 4/10/23 will be in network with her insurance company. How would she check this?

## 2023-03-14 NOTE — TELEPHONE ENCOUNTER
Confirmed with OR staff that we use [facility] ([facility]). She can call her insurance to confirm that they are in network.

## 2023-03-14 NOTE — TELEPHONE ENCOUNTER
Patient was called and provided the name of anesthesia group; [facility] [employer] [facility] ([facility]).

## 2023-03-16 ENCOUNTER — TELEPHONE (OUTPATIENT)
Dept: ORTHOPEDIC SURGERY | Age: 63
End: 2023-03-16

## 2023-03-16 NOTE — TELEPHONE ENCOUNTER
The patient called in and is requesting that a phone call be made to her insurance company to discuss who is used for anesthesia during surgeries with Dr. Anne Jones. Boby Degroot is the rep for her insurance company [facility] [phone]. Patient had provided Boby Degroot the name of the anesthesia group we use, [employer] [facility] however according to the patient Boby Degroot was unable to confirm whether this group was in network or not. Patient is very worried about whether or not this group is in network because if they are not she will be responsible for the entire surgery bill.

## 2023-03-20 ENCOUNTER — TELEPHONE (OUTPATIENT)
Dept: INTERNAL MEDICINE CLINIC | Age: 63
End: 2023-03-20

## 2023-03-20 ENCOUNTER — TELEPHONE (OUTPATIENT)
Dept: ORTHOPEDIC SURGERY | Age: 63
End: 2023-03-20

## 2023-03-20 NOTE — TELEPHONE ENCOUNTER
Brenton Mujica from [place] called and confirmed that as long as the patient's surgery is billed under Dr. Kenyon Tena who is in network and is scheduled to do the patient's surgery on 4/10/23 everything should be in network including the anesthesia group, [employer] in Anesthesia. Ad Nam can be reached at [phone]    The patient was called and provided the above information.

## 2023-03-20 NOTE — TELEPHONE ENCOUNTER
LVM for Christal Asif to call back to discuss whether GORDY is in network with her insurance company. Anemia    BPH (benign prostatic hyperplasia)    CAD (coronary artery disease)    Diabetes    DM (diabetes mellitus)    Essential hypertension    GERD (gastroesophageal reflux disease)    HLD (hyperlipidemia)    Intraparenchymal hemorrhage of brain    TIA (transient ischemic attack)

## 2023-03-20 NOTE — TELEPHONE ENCOUNTER
----- Message from Jessie Pantoja sent at 3/20/2023  3:01 PM EDT -----  Subject: Message to Provider    QUESTIONS  Information for Provider?  returning Chloe's call said Dr. Almaz Douglas   office faxed over the paperwork on 3/14  ---------------------------------------------------------------------------  --------------  [employer] [employer]  [phone]; OK to leave message on voicemail  ---------------------------------------------------------------------------  --------------  SCRIPT ANSWERS  undefined

## 2023-03-21 ENCOUNTER — TELEPHONE (OUTPATIENT)
Dept: ORTHOPEDIC SURGERY | Age: 63
End: 2023-03-21

## 2023-03-21 NOTE — TELEPHONE ENCOUNTER
Patient was called and told letter was available for .  Patient agreed to be in today to  before 5:00 pm.

## 2023-03-21 NOTE — TELEPHONE ENCOUNTER
Patient is requesting a letter stating that she is having surgery on 4/10/23. She needs this so her son has proof of why he needs to take off on that date. Patient can be reached at [phone] and is requesting a phone call when the letter is ready to be picked up.

## 2023-03-22 ENCOUNTER — TELEPHONE (OUTPATIENT)
Dept: ORTHOPEDIC SURGERY | Age: 63
End: 2023-03-22

## 2023-03-25 NOTE — PROGRESS NOTES
HISTORY OF PRESENT ILLNESS  Prasanna Marrufo is a 63 y.o. female. HPI  hx dm-2 htn hld tension headaches  Preop for right DALIA scheduled 4/10/23 Dr Augustus Chery  PAT on 4/3  Righ hip pain started in October last year--prior to then very mobile and no cp or sob  Able to walk up stairs today without any chest pain  Fsbs-none recently  No f/c cp sob syncope    Last a1c 6.8  Fsbs -nonre recent  No cp sob   No difficulty in the past with surgery or anesthesia    Activity level    Patient Active Problem List    Diagnosis Date Noted    Peripheral neuropathy 01/30/2012    Bronchitis 06/19/2009    Diabetes mellitus ([facility] [address] 06/19/2009    Diabetic gastroparesis ([facility] [address] 06/19/2009    HTN (hypertension) 06/19/2009    Hypercholesterolemia 06/19/2009    Osteoarthritis 06/19/2009     Current Outpatient Medications   Medication Sig Dispense Refill    losartan (COZAAR) 100 mg tablet Take 1 Tablet by mouth daily. 90 Tablet 2    meclizine (ANTIVERT) 25 mg tablet TAKE 1 TABLET BY MOUTH THREE (3) TIMES DAILY AS NEEDED FOR DIZZINESS FOR UP TO 10 DAYS. simvastatin (ZOCOR) 40 mg tablet       esomeprazole (NEXIUM) 40 mg capsule       diclofenac EC (VOLTAREN) 75 mg EC tablet Take 1 Tablet by mouth two (2) times a day. 60 Tablet 4    amoxicillin (AMOXIL) 500 mg capsule Take 1 Capsule by mouth three (3) times daily. 21 Capsule 0    diclofenac (VOLTAREN) 1 % gel Apply 4 g to affected area four (4) times daily. 1 Each 3    butalbital-acetaminophen-caffeine (FIORICET, ESGIC) -40 mg per tablet TAKE 1 TABLET BY MOUTH EVERY 4 HOURS AS NEEDED 30 Tablet 0    hydroCHLOROthiazide (HYDRODIURIL) 25 mg tablet One qd 90 Tablet 3    rosuvastatin (CRESTOR) 20 mg tablet Take 1 Tablet by mouth daily. 90 Tablet 3    metFORMIN (GLUCOPHAGE) 1,000 mg tablet Take 1 Tablet by mouth daily.  90 Tablet 3    glucose blood VI test strips (ASCENSIA AUTODISC VI, ONE TOUCH ULTRA TEST VI) strip One Touch Ultra 2 meter Check blood sugars twice daily 200 Strip 3    aspirin delayed-release 81 mg tablet Take 1 Tab by mouth daily. 90 Tab 3    brimonidine (ALPHAGAN) 0.2 % ophthalmic solution INSTILL 1 DROP IN EACH EYE TWICE A DAY      Blood-Glucose Meter monitoring kit Check fsbs every day 250.02 1 Kit 0    lancets misc Check fsbs every day 250.02 1 Each 11    hydrocortisone (HYTONE) 2.5 % topical cream Apply  to affected area two (2) times a day. use thin layer 30 g 0    Lancets misc One Touch Ultra 2 meter. Check blood sugars daily. ICD-10 E11.620 100 Each 11    timolol (TIMOPTIC) 0.5 % ophthalmic solution Administer 1 Drop to both eyes daily. 10 mL 4     No Known Allergies   Lab Results   Component Value Date/Time    WBC 11.0 06/06/2022 04:30 PM    HGB 13.4 06/06/2022 04:30 PM    HCT 41.6 06/06/2022 04:30 PM    PLATELET 702 88/01/1459 04:30 PM    MCV 90.8 06/06/2022 04:30 PM     Lab Results   Component Value Date/Time    GFR est non-AA >60 06/06/2022 04:30 PM    GFR est AA >60 06/06/2022 04:30 PM    Creatinine 0.86 06/06/2022 04:30 PM    BUN 20 06/06/2022 04:30 PM    Sodium 140 06/06/2022 04:30 PM    Potassium 3.6 06/06/2022 04:30 PM    Chloride 105 06/06/2022 04:30 PM    CO2 28 06/06/2022 04:30 PM        ROS    Physical Exam  Vitals and nursing note reviewed. Constitutional:       Appearance: She is well-developed. HENT:      Head: Normocephalic and atraumatic. Right Ear: Tympanic membrane normal.      Left Ear: Tympanic membrane normal.   Eyes:      Pupils: Pupils are equal, round, and reactive to light. Neck:      Vascular: No carotid bruit. Cardiovascular:      Rate and Rhythm: Normal rate and regular rhythm. Pulses: Normal pulses. Heart sounds: Normal heart sounds. No murmur heard. No friction rub. No gallop. Pulmonary:      Effort: Pulmonary effort is normal. No respiratory distress. Breath sounds: Normal breath sounds. No wheezing or rales. Abdominal:      Palpations: Abdomen is soft.    Musculoskeletal:      Cervical back: Normal range of motion and neck supple. Right lower leg: No edema. Left lower leg: No edema. Lymphadenopathy:      Cervical: No cervical adenopathy. Skin:     General: Skin is warm. Neurological:      General: No focal deficit present. Mental Status: She is alert. Psychiatric:         Mood and Affect: Mood normal.         Behavior: Behavior normal.         Thought Content: Thought content normal.         Judgment: Judgment normal.       ASSESSMENT and PLAN    ICD-10-CM ICD-9-CM    1. Type 2 diabetes mellitus without complication, unspecified whether long term insulin use (HCC)  E11.9 250.00 AMB POC HEMOGLOBIN A1C 6.5 controlled      2. Preop examination  Z01.818 V72.84 Acceptable candidate and risk for hip surgery. PAT per ortho MD      3. Hypertension, unspecified type  I10 401.9 Good control      4.  Sleep disorder  G47.9 780.50 SLEEP MEDICINE REFERRAL   Rtc 6 months CPE

## 2023-03-27 ENCOUNTER — OFFICE VISIT (OUTPATIENT)
Dept: INTERNAL MEDICINE CLINIC | Age: 63
End: 2023-03-27
Payer: COMMERCIAL

## 2023-03-27 VITALS
BODY MASS INDEX: 26.58 KG/M2 | HEART RATE: 71 BPM | SYSTOLIC BLOOD PRESSURE: 106 MMHG | RESPIRATION RATE: 16 BRPM | WEIGHT: 140.8 LBS | TEMPERATURE: 97 F | OXYGEN SATURATION: 98 % | HEIGHT: 61 IN | DIASTOLIC BLOOD PRESSURE: 74 MMHG

## 2023-03-27 DIAGNOSIS — E11.9 TYPE 2 DIABETES MELLITUS WITHOUT COMPLICATION, UNSPECIFIED WHETHER LONG TERM INSULIN USE (HCC): Primary | ICD-10-CM

## 2023-03-27 DIAGNOSIS — G47.9 SLEEP DISORDER: ICD-10-CM

## 2023-03-27 DIAGNOSIS — I10 HYPERTENSION, UNSPECIFIED TYPE: ICD-10-CM

## 2023-03-27 DIAGNOSIS — Z01.818 PREOP EXAMINATION: ICD-10-CM

## 2023-03-27 LAB — HBA1C MFR BLD HPLC: 6.5 %

## 2023-03-27 PROCEDURE — 99203 OFFICE O/P NEW LOW 30 MIN: CPT | Performed by: INTERNAL MEDICINE

## 2023-03-27 PROCEDURE — 83036 HEMOGLOBIN GLYCOSYLATED A1C: CPT | Performed by: INTERNAL MEDICINE

## 2023-03-27 NOTE — PROGRESS NOTES
1. Have you been to the ER, urgent care clinic since your last visit? Hospitalized since your last visit? No    2. Have you seen or consulted any other health care providers outside of the [address] [place] [address] since your last visit? Include any pap smears or colon screening.  No

## 2023-03-28 DIAGNOSIS — M16.11 UNILATERAL PRIMARY OSTEOARTHRITIS, RIGHT HIP: ICD-10-CM

## 2023-03-28 RX ORDER — TRAMADOL HYDROCHLORIDE 50 MG/1
50 TABLET ORAL
Qty: 28 TABLET | Refills: 0 | Status: SHIPPED | OUTPATIENT
Start: 2023-03-28 | End: 2023-04-04

## 2023-04-03 ENCOUNTER — HOSPITAL ENCOUNTER (OUTPATIENT)
Dept: PREADMISSION TESTING | Age: 63
End: 2023-04-03
Attending: ORTHOPAEDIC SURGERY
Payer: COMMERCIAL

## 2023-04-03 LAB
ABO + RH BLD: NORMAL
ALBUMIN SERPL-MCNC: 3.7 G/DL (ref 3.5–5)
ALBUMIN/GLOB SERPL: 0.9 (ref 1.1–2.2)
ALP SERPL-CCNC: 75 U/L (ref 45–117)
ALT SERPL-CCNC: 23 U/L (ref 12–78)
ANION GAP SERPL CALC-SCNC: 4 MMOL/L (ref 5–15)
APPEARANCE UR: CLEAR
AST SERPL-CCNC: 15 U/L (ref 15–37)
ATRIAL RATE: 57 BPM
BACTERIA URNS QL MICRO: NEGATIVE /HPF
BILIRUB SERPL-MCNC: 0.5 MG/DL (ref 0.2–1)
BILIRUB UR QL: NEGATIVE
BLOOD GROUP ANTIBODIES SERPL: NORMAL
BUN SERPL-MCNC: 18 MG/DL (ref 6–20)
BUN/CREAT SERPL: 24 (ref 12–20)
CALCIUM SERPL-MCNC: 9.4 MG/DL (ref 8.5–10.1)
CALCULATED P AXIS, ECG09: -9 DEGREES
CALCULATED R AXIS, ECG10: 12 DEGREES
CALCULATED T AXIS, ECG11: 20 DEGREES
CHLORIDE SERPL-SCNC: 106 MMOL/L (ref 97–108)
CO2 SERPL-SCNC: 29 MMOL/L (ref 21–32)
COLOR UR: ABNORMAL
CREAT SERPL-MCNC: 0.75 MG/DL (ref 0.55–1.02)
DIAGNOSIS, 93000: NORMAL
EPITH CASTS URNS QL MICRO: ABNORMAL /LPF
ERYTHROCYTE [DISTWIDTH] IN BLOOD BY AUTOMATED COUNT: 13.5 % (ref 11.5–14.5)
EST. AVERAGE GLUCOSE BLD GHB EST-MCNC: 151 MG/DL
GLOBULIN SER CALC-MCNC: 4.1 G/DL (ref 2–4)
GLUCOSE SERPL-MCNC: 128 MG/DL (ref 65–100)
GLUCOSE UR STRIP.AUTO-MCNC: NEGATIVE MG/DL
HBA1C MFR BLD: 6.9 % (ref 4–5.6)
HCT VFR BLD AUTO: 39.6 % (ref 35–47)
HGB BLD-MCNC: 13.2 G/DL (ref 11.5–16)
HGB UR QL STRIP: NEGATIVE
HYALINE CASTS URNS QL MICRO: ABNORMAL /LPF (ref 0–2)
INR PPP: 1 (ref 0.9–1.1)
KETONES UR QL STRIP.AUTO: NEGATIVE MG/DL
LEUKOCYTE ESTERASE UR QL STRIP.AUTO: NEGATIVE
MCH RBC QN AUTO: 29.2 PG (ref 26–34)
MCHC RBC AUTO-ENTMCNC: 33.3 G/DL (ref 30–36.5)
MCV RBC AUTO: 87.6 FL (ref 80–99)
NITRITE UR QL STRIP.AUTO: NEGATIVE
NRBC # BLD: 0 K/UL (ref 0–0.01)
NRBC BLD-RTO: 0 PER 100 WBC
P-R INTERVAL, ECG05: 156 MS
PH UR STRIP: 6 (ref 5–8)
PLATELET # BLD AUTO: 278 K/UL (ref 150–400)
PMV BLD AUTO: 11.4 FL (ref 8.9–12.9)
POTASSIUM SERPL-SCNC: 3.2 MMOL/L (ref 3.5–5.1)
PROT SERPL-MCNC: 7.8 G/DL (ref 6.4–8.2)
PROT UR STRIP-MCNC: NEGATIVE MG/DL
PROTHROMBIN TIME: 10.3 SEC (ref 9–11.1)
Q-T INTERVAL, ECG07: 434 MS
QRS DURATION, ECG06: 78 MS
QTC CALCULATION (BEZET), ECG08: 422 MS
RBC # BLD AUTO: 4.52 M/UL (ref 3.8–5.2)
RBC #/AREA URNS HPF: ABNORMAL /HPF (ref 0–5)
SODIUM SERPL-SCNC: 139 MMOL/L (ref 136–145)
SP GR UR REFRACTOMETRY: 1.01
SPECIMEN EXP DATE BLD: NORMAL
UA: UC IF INDICATED,UAUC: ABNORMAL
UROBILINOGEN UR QL STRIP.AUTO: 1 EU/DL (ref 0.2–1)
VENTRICULAR RATE, ECG03: 57 BPM
WBC # BLD AUTO: 8.9 K/UL (ref 3.6–11)
WBC URNS QL MICRO: ABNORMAL /HPF (ref 0–4)

## 2023-04-03 PROCEDURE — 36415 COLL VENOUS BLD VENIPUNCTURE: CPT

## 2023-04-03 PROCEDURE — 85027 COMPLETE CBC AUTOMATED: CPT

## 2023-04-03 PROCEDURE — 80053 COMPREHEN METABOLIC PANEL: CPT

## 2023-04-03 PROCEDURE — 86900 BLOOD TYPING SEROLOGIC ABO: CPT

## 2023-04-03 PROCEDURE — 83036 HEMOGLOBIN GLYCOSYLATED A1C: CPT

## 2023-04-03 PROCEDURE — 81001 URINALYSIS AUTO W/SCOPE: CPT

## 2023-04-03 PROCEDURE — 93005 ELECTROCARDIOGRAM TRACING: CPT

## 2023-04-03 PROCEDURE — 97116 GAIT TRAINING THERAPY: CPT

## 2023-04-03 PROCEDURE — 97161 PT EVAL LOW COMPLEX 20 MIN: CPT

## 2023-04-03 PROCEDURE — 85610 PROTHROMBIN TIME: CPT

## 2023-04-03 RX ORDER — GLUCOSAMINE SULFATE 1500 MG
1000 POWDER IN PACKET (EA) ORAL DAILY
COMMUNITY

## 2023-04-03 RX ORDER — ACETAMINOPHEN 500 MG
TABLET ORAL
COMMUNITY

## 2023-04-03 RX ORDER — POTASSIUM CHLORIDE 750 MG/1
10 TABLET, EXTENDED RELEASE ORAL DAILY
Qty: 7 TABLET | Refills: 0 | Status: SHIPPED | OUTPATIENT
Start: 2023-04-03 | End: 2023-04-10

## 2023-04-03 NOTE — PERIOP NOTES
[facility]  Joint/Spine Preoperative Instructions        Surgery Date 4/10/23          Time of Arrival to be called at [phone]    1. On the day of your surgery, please report to the Surgical Services Registration Desk and sign in at your designated time. The Surgery Center is located to the right of the Emergency Room. 2. You must have someone with you to drive you home. You should not drive a car for 24 hours following surgery. Please make arrangements for a friend or family member to stay with you for the first 24 hours after your surgery. 3. No food after midnight. Medications morning of surgery should be taken with a sip of water. Please follow pre-surgery drink instructions that were given at your Pre Admission Testing appointment. 4. We recommend you do not drink any alcoholic beverages for 24 hours before and after your surgery. 5. Contact your surgeons office for instructions on the following medications: non-steroidal anti-inflammatory drugs (i.e. Advil, Aleve), vitamins, and supplements. (Some surgeons will want you to stop these medications prior to surgery and others may allow you to take them)  **If you are currently taking Plavix, Coumadin, Aspirin and/or other blood-thinning agents, contact your surgeon for instructions. ** Your surgeon will partner with the physician prescribing these medications to determine if it is safe to stop or if you need to continue taking. Please do not stop taking these medications without instructions from your surgeon    6. Wear comfortable clothes. Wear glasses instead of contacts. Do not bring any money or jewelry. Please bring picture ID, insurance card, and any prearranged co-payment or hospital payment. Do not wear make-up, particularly mascara the morning of your surgery. Do not wear nail polish, particularly if you are having foot /hand surgery.   Wear your hair loose or down, no ponytails, buns, lenora pins or clips. All body piercings must be removed. Please shower with antibacterial soap for three consecutive days before and on the morning of surgery, but do not apply any lotions, powders or deodorants after the shower on the day of surgery. Please use a fresh towels after each shower. Please sleep in clean clothes and change bed linens the night before surgery. Please do not shave for 48 hours prior to surgery. Shaving of the face is acceptable. 7. You should understand that if you do not follow these instructions your surgery may be cancelled. If your physical condition changes (I.e. fever, cold or flu) please contact your surgeon as soon as possible. 8. It is important that you be on time. If a situation occurs where you may be late, please call [phone] (OR Holding Area). 9. If you have any questions and or problems, please call [phone] (Pre-admission Testing). 10. Your surgery time may be subject to change. You will receive a phone call the evening prior with your time of arrival.    11.  If having outpatient surgery, you must have someone to drive you here, stay with you during the duration of your stay, and to drive you home at time of discharge. 12. The following link is for the educational video for patients and/or families. [email or web address]    Special Instructions: Per Dr. Rosen Poster office, pt to hold aspirin 7 days prior to surgery. TAKE ALL MEDICATIONS THE DAY OF SURGERY EXCEPT: aspirin, metformin, vitamin D, tramadol, tylenol. May take other medications with a sip of water. I understand a pre-operative phone call will be made to verify my surgery time. In the event that I am not available, I give permission for a message to be left on my answering service and/or with another person?   yes         ___________________      __________   _________ (Signature of Patient)             (Witness)                (Date and Time)

## 2023-04-03 NOTE — PROGRESS NOTES
Patient attended the Joint Replacement Education Class at [facility]. The content of the class was presented using a power point presentation specific for patients undergoing hip and knee replacement surgery. [facility] Joint Replacement Education Handbook was given to the patient. Preparing for surgery, day of surgery routine and expectations, discharge process and help at home expectations, nutrition,medications, infection control, pain management, DVT prevention, ice therapy and safety were reviewed in class. Opportunity for questions provided, patient verbalized understanding of instructions.

## 2023-04-03 NOTE — PERIOP NOTES
Orthopedic and Spine Patients: Instructions on When You Can   Eat or Drink Before Surgery      You have been provided a pre-surgery drink received at your pre-admission testing appointment. Night before surgery: You should drink 1/2 bottle of the  pre-surgery drink at bedtime. No food after midnight! Day of Surgery:  Complete 2nd half of the bottle of the pre-surgery drink 1 hour prior to arrival at hospital.  For questions call Pre-Admission Testing at [phone]. They are available from 8:00am-5:00pm, Monday through Friday.

## 2023-04-03 NOTE — PROGRESS NOTES
[facility]  Physical Therapy Pre-surgery evaluation  [address], [address]    PHYSICAL THERAPY PRE THR SURGERY EVALUATION  Patient: Mandeep Damico (89 y.o. female)  Date: 4/3/2023  Primary Diagnosis: pat  Procedure(s) (LRB):  RIGHT TOTAL HIP ARTHROPLASTY, DIRECT ANTERIOR (Right)     Precautions: Fall risk       ASSESSMENT :  Based on the objective data described below, the patient presents with impaired gait, balance, pain, and overall high level functional mobility due to end stage degenerative joint disease in the right hip. Discussed anticipated disposition to home with possible discharge within a 1 to 2 day time frame post-surgery. Patient and  in agreement. GOALS: (Goals have been discussed and agreed upon with patient.)  DISCHARGE GOALS: Time Frame: 1 DAY  Patient will demonstrate increased strength, range of motion, and pain control via a home exercise program in order to minimize functional deficits in preparation for their upcoming surgery. This will be achieved by using education, demonstration and through the use of an informational handout including a home exercise program.  REHABILITATION POTENTIAL FOR STATED GOALS: Good     RECOMMENDATIONS AND PLANNED INTERVENTIONS: (Benefits and precautions of physical therapy have been discussed with the patient.)  Home Exercise Program  TREATMENT PLAN EFFECTIVE DATES: 4/3/2023 TO 4/3/2023  FREQUENCY/DURATION: Patient to continue to perform home exercise program at least twice daily until her surgery. SUBJECTIVE:   Patient stated I know I need to get these knees done after this hip.     OBJECTIVE DATA SUMMARY:   HISTORY:    Past Medical History:   Diagnosis Date    Chronic pain     Diabetes mellitus ([address] 06/19/2009    type 2    Diverticulosis 01/01/2009    colonoscopy .  Dr. Fernando Thomas    GERD (gastroesophageal reflux disease)     Headache     HTN (hypertension) 06/19/2009    Hypercholesterolemia 06/19/2009 Osteoarthritis 06/19/2009    Osteoporosis      Past Surgical History:   Procedure Laterality Date    COLONOSCOPY N/A 11/10/2020    COLONOSCOPY performed by Katherine Uriarte MD at [facility] ENDOSCOPY    COLONOSCOPY,DIAGNOSTIC  11/10/2020         HX DILATION AND CURETTAGE      HX WISDOM TEETH EXTRACTION       Prior Level of Function/Home Situation: mod I with SPC  Personal factors and/or comorbidities impacting plan of care:     Patient []   does  []   does not state signs/symptoms of shortness of breath/dyspnea on exertion/respiratory distress. Home Situation  Home Environment: Apartment  # Steps to Enter: 0  Living Alone: Yes  Support Systems: Child(li) (cousin may come stay for 1 week)  Patient Expects to be Discharged to[de-identified] Home (son to help per pt)  Current DME Used/Available at Home: Cane, straight, Commode, bedside, Grab bars  Tub or Shower Type: Tub/Shower combination      EXAMINATION/PRESENTATION/DECISION MAKING:     ADLs (Current Functional Status):    Bathing/Showering:   [x] Independent  [] Requires Assistance from Someone  [] Sponge Bath Only   Ambulation:  [] Independent  [] Walk Indoors Only  [] Walk Outdoors  [x] Use Assistive Device  [] Use Wheelchair Only     Dressing:  [x] Independent    Requires Assistance from Someone for:  [] Sock/Shoes  [] Pants  [] Everything   Household Activities:  [] Routine house and yard work  [x] Light Housework Only - working \"with dietary\"  [] None         Strength:    Strength: Generally decreased, functional                    Tone & Sensation:   Tone: Normal              Sensation: Intact               Range Of Motion:  AROM: Within functional limits                       Coordination:  Coordination: Within functional limits    Functional Mobility:  Transfers:  Sit to Stand: Modified independent, Additional time  Stand to Sit: Modified independent, Additional time                       Balance:   Sitting: Intact  Standing: Impaired, With support  Standing - Static: Constant support, Fair  Standing - Dynamic : Constant support, Fair  Ambulation/Gait Training:  Distance (ft): 150 Feet (ft)  Assistive Device: Cane, straight  Ambulation - Level of Assistance: Modified independent        Gait Abnormalities: Antalgic, Decreased step clearance, Trendelenburg        Base of Support: Widened  Stance: Right decreased  Speed/Sonia: Slow  Step Length: Right shortened, Left shortened          Therapeutic Exercises:   The patient was educated in, has demonstrated, and has received written instructions to complete for their home exercise program per total hip replacement protocol. Functional Measure:  Barthel Index:  10 Meter walk test:  (Specify if any supplemental oxygen is used, the type, pre, during and post sats.)       Trial 1 (Time to Walk 10 Meters): 12.56 Seconds  Trial 2 (Time to Walk 10 Meters): 12.48 Seconds  Trial 3 (Time to Walk 10 Meters): 12.01 Seconds  Average : 12.4 Seconds  Score (Meters/Second): 0.8 Meters/Second           Minimal Detectable Change (MDC-90) = 0.1 m/s  Yash SORENSEN. \"Comfortable and maximum walking speed of adults aged 20-79 years: reference values and determinants. \" Age and Agin Volume 26(1):15-9. Alpa Laboy. \"Age- and gender-related test performance in community-dwelling elderly people: Six-Minute Walk Test, Wong Balance Scale, Timed Up & Go Test, and gait speeds. \" Physical Therapy: 2002 Volume 82(2):128-37. Curtis DOBBS, Michelle PW, Ann STONERD, [facility] WILTON. \"Assessing stability and change of four performance measures: a longitudinal study evaluating outcome following total hip and knee arthroplasty. \" [facility] Musculoskeletal Disorders: 2005 Volume 6(3). Michel Alford, PhD; Julia Louis, PhD. Signa Taz Paper: \"Walking Speed: the Sixth Vital Sign\" Journal of Geriatric Physical Therapy: 2009 - Volume 32 - Issue 2 - p 2-5 .          Pain:  Pain Scale 1: Numeric (0 - 10)  Pain Intensity 1: 7                Activity Tolerance: Fair/good with rest breaks   Patient []   does  [x]   does not demonstrate signs/symptoms of shortness of breath/dyspnea on exertion/respiratory distress. COMMUNICATION/EDUCATION:   The patient was educated on:  [x]         Early post operative mobility is imperative to achieve a patient's desired outcomes and to restore biological function. [x]         Post operative hip precautions may/may not be applicable. These precautions are based on the patient's physician and the procedure(s) performed. [x]         Anterior hip precautions including:        No hip extension        No external rotation        No crossing of the legs/midline    [x]         Posterior hip precautions including:        No hip flexion >90 degrees        No internal rotation        No crossing of the legs/midline    The patients plan of care was discussed as follows:   [x]         The patient verbalized understanding of her plan in preparation for their upcoming surgery  []         The patient's  was present for this session  []        The patient reports that he/she does not have a  identified at this time  []         The  verbalized understanding of the education regarding the patient's upcoming surgery  [x]         Patient/family agree to work toward stated goals and plan of care. []         Patient understands intent and goals of therapy, but is neutral about his/her participation. []         Patient is unable to participate in goal setting and plan of care.       Thank you for this referral.  Gurinder Soliman, PT, DPT, NCS   Time Calculation: 21 mins

## 2023-04-03 NOTE — PERIOP NOTES
Per Rubens Herrera at Dr. Garcia Forward office, patient to hold ASA 7 days prior to surgery and does not need to hold vitamin D prior to surgery. Pt made aware and verbalized understanding.

## 2023-04-03 NOTE — PERIOP NOTES
Hibiclens/Chlorhexidine    Preventing Infections Before and After - Your Surgery    IMPORTANT INSTRUCTIONS    Please read and follow these instructions carefully. If you are unable to comply with the below instructions your procedure will be cancelled. Every Night for Three (3) nights before your surgery:  Shower with an antibacterial soap, such as Dial, or the soap provided at your preassessment appointment. A shower is better than a bath for cleaning your skin. If needed, ask someone to help you reach all areas of your body. Dont forget to clean your belly button with every shower. The night before your surgery: If you lose your Hibiclens/chlorhexidine please contact surgery center or you can purchase it at a local pharmacy  On the night before your surgery, shower with an antibacterial soap, such as Dial, or the soap provided at your preassessment appointment. With one packet of Hibiclens/Chlorhexidine in hand, turn water off. Apply Hibiclens antiseptic skin cleanser with a clean, freshly washed washcloth. Gently apply to your body from chin to toes (except the genital area) and especially the area(s) where your incision(s) will be. Leave Hibiclens/Chlorhexidine on your skin for at least 20 seconds. CAUTION: If needed, Hibiclens/chlorhexidine may be used to clean the folds of skin of the legs (such as in the area of the groin) and on your buttocks and hips. However, do not use Hibiclens/Chlorhexidine above the neck or in the genital area (your bottom) or put inside any area of your body. Turn the water back on and rinse. Dry gently with a clean, freshly washed towel. After your shower, do not use any powder, deodorant, perfumes or lotion. Use clean, freshly washed towels and washcloths every time you shower. Wear clean, freshly washed pajamas to bed the night before surgery. Sleep on clean, freshly washed sheets. Do not allow pets to sleep in your bed with you.         The Morning of your surgery:  Shower again thoroughly with an antibacterial soap, such as Dial or the soap provided at your preassessment appointment. If needed, ask someone for help to reach all areas of your body. Dont forget to clean your belly button! Rinse. Dry gently with a clean, freshly washed towel. After your shower, do not use any powder, deodorant, perfumes or lotion prior to surgery. Put on clean, freshly washed clothing. Tips to help prevent infections after your surgery:  Protect your surgical wound from germs:  Hand washing is the most important thing you and your caregivers can do to prevent infections. Keep your bandage clean and dry! Do not touch your surgical wound. Use clean, freshly washed towels and washcloths every time you shower; do not share bath linens with others. Until your surgical wound is healed, wear clothing and sleep on bed linens each day that are clean and freshly washed. Do not allow pets to sleep in your bed with you or touch your surgical wound. Do not smoke - smoking delays wound healing. This may be a good time to stop smoking. If you have diabetes, it is important for you to manage your blood sugar levels properly before your surgery as well as after your surgery. Poorly managed blood sugar levels slow down wound healing and prevent you from healing completely. If you lose your Hibiclens/chlorhexidine, please call the [facility], or it is available for purchase at your pharmacy.                ___________________      ___________________      ________________  (Signature of Patient)          (Witness)                   (Date and Time)

## 2023-04-03 NOTE — PERIOP NOTES
LVM for pt in regards to K+ level. Prescription for potassium chloride sent to pt's pharmacy via GIANCARLO Chandler NP. Pt to take one 10 meq tablet daily for 7 days. Waiting for CB.       4/5- Contacted pt's son to ask if he could get in touch with pt and have her call us back. Pt's son stated he would contact his mother. Daria Chi

## 2023-04-03 NOTE — PERIOP NOTES

## 2023-04-03 NOTE — ADVANCED PRACTICE NURSE
PAT Nurse Practitioner   Pre-Operative Chart Review/Assessment:-ORTHOPEDIC/NEUROSURGICAL SPINE                Patient Name:  Chloe Winkler                                                           Age:   63 y.o.    :  1960     Today's Date:  2023     Date of PAT:   4/3/23      Date of Surgery:    4/10/2023      Procedure(s):  Right  Total Hip Arthroplasty     Surgeon:   Salena Coleman     Primary Care Provider:    Dr. Rut Hogan:      1)  Cardiac Clearance:  Not requested        2)  Sleep apnea screening:  Referred by PCP on 3/27/23 [facility] \"[facility] \"    DANIA score in PAT  is 2. Pt denies loud snoring or witnessed apnea while sleeping. 3)   Program for Diabetes Health Consult:  Not indicated-A1C 6.9      4) Treatment for MRSA/MSSA:  Negative       5) Additional Concerns:  T2DM, HTN, gastroparesis, peripheral neuropathy                 Vital Signs:         Visit Vitals  BP (!) 104/59 (BP 1 Location: Right upper arm, BP Patient Position: At rest;Sitting) Comment: per pt, her bp runs low   Pulse 66   Resp 16   Ht 5' 1.25\" (1.556 m)   Wt 66 kg (145 lb 8.1 oz)   LMP 2010   SpO2 100%   BMI 27.27 kg/m²                        ____________________________________________  PAST MEDICAL HISTORY  Past Medical History:   Diagnosis Date    Chronic pain     Diabetes mellitus ([address] 2009    type 2    Diverticulosis 2009    colonoscopy .  Dr. Gurinder Hernandez    GERD (gastroesophageal reflux disease)     Headache     HTN (hypertension) 2009    Hypercholesterolemia 2009    Osteoarthritis 2009    Osteoporosis       ____________________________________________  PAST SURGICAL HISTORY  Past Surgical History:   Procedure Laterality Date    COLONOSCOPY N/A 11/10/2020    COLONOSCOPY performed by Royal York MD at hospitals ENDOSCOPY    COLONOSCOPY,DIAGNOSTIC  11/10/2020         HX DILATION AND CURETTAGE      HX WISDOM TEETH EXTRACTION ____________________________________________  HOME MEDICATIONS    Current Outpatient Medications   Medication Sig    cholecalciferol (Vitamin D3) 25 mcg (1,000 unit) cap Take 1,000 Units by mouth daily. acetaminophen (Tylenol Extra Strength) 500 mg tablet Take  by mouth every six (6) hours as needed for Pain.    potassium chloride (Klor-Con M10) 10 mEq tablet Take 1 Tablet by mouth daily for 7 days. Indications: low amount of potassium in the blood    losartan (COZAAR) 100 mg tablet Take 1 Tablet by mouth daily. meclizine (ANTIVERT) 25 mg tablet TAKE 1 TABLET BY MOUTH THREE (3) TIMES DAILY AS NEEDED FOR DIZZINESS FOR UP TO 10 DAYS. butalbital-acetaminophen-caffeine (FIORICET, ESGIC) -40 mg per tablet TAKE 1 TABLET BY MOUTH EVERY 4 HOURS AS NEEDED    hydroCHLOROthiazide (HYDRODIURIL) 25 mg tablet One qd    rosuvastatin (CRESTOR) 20 mg tablet Take 1 Tablet by mouth daily. metFORMIN (GLUCOPHAGE) 1,000 mg tablet Take 1 Tablet by mouth daily. aspirin delayed-release 81 mg tablet Take 1 Tab by mouth daily. brimonidine (ALPHAGAN) 0.2 % ophthalmic solution INSTILL 1 DROP IN EACH EYE TWICE A DAY    hydrocortisone (HYTONE) 2.5 % topical cream Apply  to affected area two (2) times a day. use thin layer    timolol (TIMOPTIC) 0.5 % ophthalmic solution Administer 1 Drop to both eyes daily.     Blood-Glucose Meter monitoring kit Check fsbs every day 250.02 (Patient not taking: Reported on 3/27/2023)     No current facility-administered medications for this encounter.      ____________________________________________  ALLERGIES  No Known Allergies   ____________________________________________  SOCIAL HISTORY  Social History     Tobacco Use    Smoking status: Never    Smokeless tobacco: Never   Substance Use Topics    Alcohol use: No      ____________________________________________  COVID VACCINATION STATUS:      Internal Administration   First Dose      Second Dose         Last COVID Lab SARS-CoV-2 ( ) Date Value   11/06/2020 Not Detected                      Labs:     Hospital Outpatient Visit on 04/03/2023   Component Date Value Ref Range Status    INR 04/03/2023 1.0  0.9 - 1.1   Final    A single therapeutic range for Vit K antagonists may not be optimal for all indications - see June, 2008 issue of Chest, American College of Chest Physicians Evidence-Based Clinical Practice Guidelines, 8th Edition. Prothrombin time 04/03/2023 10.3  9.0 - 11.1 sec Final    Color 04/03/2023 YELLOW/STRAW    Final    Color Reference Range: Straw, Yellow or Dark Yellow    Appearance 04/03/2023 CLEAR  CLEAR   Final    Specific gravity 04/03/2023 1.014    Final    pH (UA) 04/03/2023 6.0  5.0 - 8.0   Final    Protein 04/03/2023 Negative  NEG mg/dL Final    Glucose 04/03/2023 Negative  NEG mg/dL Final    Ketone 04/03/2023 Negative  NEG mg/dL Final    Bilirubin 04/03/2023 Negative  NEG   Final    Blood 04/03/2023 Negative  NEG   Final    Urobilinogen 04/03/2023 1.0  0.2 - 1.0 EU/dL Final    Nitrites 04/03/2023 Negative  NEG   Final    Leukocyte Esterase 04/03/2023 Negative  NEG   Final    UA:UC IF INDICATED 04/03/2023 CULTURE NOT INDICATED BY UA RESULT  CNI   Final    WBC 04/03/2023 0-4  0 - 4 /hpf Final    RBC 04/03/2023 0-5  0 - 5 /hpf Final    Epithelial cells 04/03/2023 MODERATE (A)  FEW /lpf Final    Epithelial cell category consists of squamous cells and /or transitional urothelial cells. Renal tubular cells, if present, are separately identified as such.     Bacteria 04/03/2023 Negative  NEG /hpf Final    Hyaline cast 04/03/2023 0-2  0 - 2 /lpf Final    Ventricular Rate 04/03/2023 57  BPM Final    Atrial Rate 04/03/2023 57  BPM Final    P-R Interval 04/03/2023 156  ms Final    QRS Duration 04/03/2023 78  ms Final    Q-T Interval 04/03/2023 434  ms Final    QTC Calculation (Bezet) 04/03/2023 422  ms Final    Calculated P Axis 04/03/2023 -9  degrees Final    Calculated R Axis 04/03/2023 12  degrees Final    Calculated T Axis 04/03/2023 20  degrees Final    Diagnosis 04/03/2023    Final                    Value:Sinus bradycardia  Non-specific ST & T wave changes  Confirmed by Sudhakar Garvin MD, [facility] ([id number]) on 4/3/2023 10:30:22 PM      Hemoglobin A1c 04/03/2023 6.9 (H)  4.0 - 5.6 % Final    Comment: NEW METHOD  PLEASE NOTE NEW REFERENCE RANGE  (NOTE)  HbA1C Interpretive Ranges  <5.7              Normal  5.7 - 6.4         Consider Prediabetes  >6.5              Consider Diabetes      Est. average glucose 04/03/2023 151  mg/dL Final    Special Requests: 04/03/2023 NO SPECIAL REQUESTS    Final    Culture result: 04/03/2023 MRSA NOT PRESENT    Final    Culture result: 04/03/2023     Final                    Value:Screening of patient nares for MRSA is for surveillance purposes and, if positive, to facilitate isolation considerations in high risk settings. It is not intended for automatic decolonization interventions per se as regimens are not sufficiently effective to warrant routine use.       WBC 04/03/2023 8.9  3.6 - 11.0 K/uL Final    RBC 04/03/2023 4.52  3.80 - 5.20 M/uL Final    HGB 04/03/2023 13.2  11.5 - 16.0 g/dL Final    HCT 04/03/2023 39.6  35.0 - 47.0 % Final    MCV 04/03/2023 87.6  80.0 - 99.0 FL Final    MCH 04/03/2023 29.2  26.0 - 34.0 PG Final    MCHC 04/03/2023 33.3  30.0 - 36.5 g/dL Final    RDW 04/03/2023 13.5  11.5 - 14.5 % Final    PLATELET 03/07/7946 920  150 - 400 K/uL Final    MPV 04/03/2023 11.4  8.9 - 12.9 FL Final    NRBC 04/03/2023 0.0  0  WBC Final    ABSOLUTE NRBC 04/03/2023 0.00  0.00 - 0.01 K/uL Final    Sodium 04/03/2023 139  136 - 145 mmol/L Final    Potassium 04/03/2023 3.2 (L)  3.5 - 5.1 mmol/L Final    Chloride 04/03/2023 106  97 - 108 mmol/L Final    CO2 04/03/2023 29  21 - 32 mmol/L Final    Anion gap 04/03/2023 4 (L)  5 - 15 mmol/L Final    Glucose 04/03/2023 128 (H)  65 - 100 mg/dL Final    BUN 04/03/2023 18  6 - 20 MG/DL Final    Creatinine 04/03/2023 0.75  0.55 - 1.02 MG/DL Final    BUN/Creatinine ratio 04/03/2023 24 (H)  12 - 20   Final    eGFR 04/03/2023 >60  >60 ml/min/1.73m2 Final    Comment:      Pediatric calculator link: Ganga.at. org/professionals/kdoqi/gfr_calculatorped       These results are not intended for use in patients <25years of age. eGFR results are calculated without a race factor using  the 2021 CKD-EPI equation. Careful clinical correlation is recommended, particularly when comparing to results calculated using previous equations. The CKD-EPI equation is less accurate in patients with extremes of muscle mass, extra-renal metabolism of creatinine, excessive creatine ingestion, or following therapy that affects renal tubular secretion. Calcium 04/03/2023 9.4  8.5 - 10.1 MG/DL Final    Bilirubin, total 04/03/2023 0.5  0.2 - 1.0 MG/DL Final    ALT (SGPT) 04/03/2023 23  12 - 78 U/L Final    AST (SGOT) 04/03/2023 15  15 - 37 U/L Final    Alk. phosphatase 04/03/2023 75  45 - 117 U/L Final    Protein, total 04/03/2023 7.8  6.4 - 8.2 g/dL Final    Albumin 04/03/2023 3.7  3.5 - 5.0 g/dL Final    Globulin 04/03/2023 4.1 (H)  2.0 - 4.0 g/dL Final    A-G Ratio 04/03/2023 0.9 (L)  1.1 - 2.2   Final    Crossmatch Expiration 04/03/2023 04/13/2023,2359   Final    ABO/Rh(D) 04/03/2023 O POSITIVE   Final    Antibody screen 04/03/2023 NEG   Final   Office Visit on 03/27/2023   Component Date Value Ref Range Status    Hemoglobin A1c (POC) 03/27/2023 6.5  % Preliminary        XR Results (most recent):    Results from Hospital Encounter encounter on 08/29/22    XR KNEE RT MIN 4 V    Narrative  EXAM: XR KNEE RT MIN 4 V    INDICATION: Right knee pain. COMPARISON: None. FINDINGS: Four views of the right knee demonstrate no fracture or other acute  osseous or articular abnormality. There is no effusion. The joint spaces are  relatively well-preserved. Impression  No acute abnormality.            Skin:   Denies open wounds, cuts, sores, rashes or other areas of concern in PAT assessment. Julia Antoine, KAMAR         4/3/23 K+ 3.2 in PAT appointment. On HCTZ 25 mg daily. Rx for Klor Con 10 mEq once daily for 7 days escribed to pt's pharmacy on file for pt to start today. POC chem 8 to be repeated on DOS. Latest Reference Range & Units 4/3/23 10:19   Sodium 136 - 145 mmol/L 139   Potassium 3.5 - 5.1 mmol/L 3.2 (L)   Chloride 97 - 108 mmol/L 106   CO2 21 - 32 mmol/L 29   Anion gap 5 - 15 mmol/L 4 (L)   Glucose 65 - 100 mg/dL 128 (H)   BUN 6 - 20 MG/DL 18   Creatinine 0.55 - 1.02 MG/DL 0.75   BUN/Creatinine ratio 12 - 20   24 (H)   Calcium 8.5 - 10.1 MG/DL 9.4   eGFR >60 ml/min/1.73m2 >60   Bilirubin, total 0.2 - 1.0 MG/DL 0.5   Protein, total 6.4 - 8.2 g/dL 7.8   Albumin 3.5 - 5.0 g/dL 3.7   Globulin 2.0 - 4.0 g/dL 4.1 (H)   A-G Ratio 1.1 - 2.2   0.9 (L)   ALT 12 - 78 U/L 23   AST 15 - 37 U/L 15   Alk.  phosphatase 45 - 117 U/L 75

## 2023-04-04 LAB
BACTERIA SPEC CULT: NORMAL
BACTERIA SPEC CULT: NORMAL
SERVICE CMNT-IMP: NORMAL

## 2023-04-05 NOTE — PERIOP NOTES
Lm on  for patient to return our call to verify she picked up prescription that was called in on 4/3/23.    [phone]-patient left message on voicemail that she just got our message about Potassium prescription. She states she will  and start today.

## 2023-04-10 PROBLEM — M16.11 UNILATERAL PRIMARY OSTEOARTHRITIS, RIGHT HIP: Status: ACTIVE | Noted: 2023-04-10

## 2023-04-11 PROBLEM — M17.12 OSTEOARTHRITIS OF LEFT KNEE, UNSPECIFIED OSTEOARTHRITIS TYPE: Status: ACTIVE | Noted: 2023-04-11

## 2023-04-17 ENCOUNTER — TELEPHONE (OUTPATIENT)
Dept: ORTHOPEDIC SURGERY | Age: 63
End: 2023-04-17

## 2023-04-17 NOTE — TELEPHONE ENCOUNTER
Returned patient's phone call and LVM for patient to C/B to r/s her 4/24/25 apt as she requested on VM she had left earlier.

## 2023-04-18 ENCOUNTER — TELEPHONE (OUTPATIENT)
Dept: ORTHOPEDIC SURGERY | Age: 63
End: 2023-04-18

## 2023-04-18 NOTE — TELEPHONE ENCOUNTER
Patient has been prescribed multiple medications before and after surgery and she needs to know which ones she is suppose to take now that she completed her RT Total hip surgery on 4/10/23

## 2023-04-21 DIAGNOSIS — Z47.1 AFTERCARE FOLLOWING RIGHT HIP JOINT REPLACEMENT SURGERY: Primary | ICD-10-CM

## 2023-04-21 DIAGNOSIS — Z96.641 AFTERCARE FOLLOWING RIGHT HIP JOINT REPLACEMENT SURGERY: Primary | ICD-10-CM

## 2023-04-25 ENCOUNTER — HOSPITAL ENCOUNTER (OUTPATIENT)
Dept: GENERAL RADIOLOGY | Age: 63
Discharge: HOME | End: 2023-04-25
Payer: COMMERCIAL

## 2023-04-25 ENCOUNTER — OFFICE VISIT (OUTPATIENT)
Dept: ORTHOPEDIC SURGERY | Age: 63
End: 2023-04-25
Payer: COMMERCIAL

## 2023-04-25 VITALS
RESPIRATION RATE: 18 BRPM | DIASTOLIC BLOOD PRESSURE: 69 MMHG | SYSTOLIC BLOOD PRESSURE: 101 MMHG | TEMPERATURE: 98 F | WEIGHT: 145 LBS | OXYGEN SATURATION: 100 % | BODY MASS INDEX: 28.47 KG/M2 | HEART RATE: 77 BPM | HEIGHT: 60 IN

## 2023-04-25 DIAGNOSIS — Z96.641 AFTERCARE FOLLOWING RIGHT HIP JOINT REPLACEMENT SURGERY: Primary | ICD-10-CM

## 2023-04-25 DIAGNOSIS — Z47.1 AFTERCARE FOLLOWING RIGHT HIP JOINT REPLACEMENT SURGERY: ICD-10-CM

## 2023-04-25 DIAGNOSIS — Z47.1 AFTERCARE FOLLOWING RIGHT HIP JOINT REPLACEMENT SURGERY: Primary | ICD-10-CM

## 2023-04-25 DIAGNOSIS — Z96.641 AFTERCARE FOLLOWING RIGHT HIP JOINT REPLACEMENT SURGERY: ICD-10-CM

## 2023-04-25 PROCEDURE — 99024 POSTOP FOLLOW-UP VISIT: CPT | Performed by: ORTHOPAEDIC SURGERY

## 2023-04-25 PROCEDURE — 73502 X-RAY EXAM HIP UNI 2-3 VIEWS: CPT

## 2023-04-25 RX ORDER — TRAMADOL HYDROCHLORIDE 50 MG/1
50 TABLET ORAL
Qty: 28 TABLET | Refills: 0 | Status: SHIPPED | OUTPATIENT
Start: 2023-04-25 | End: 2023-05-02

## 2023-04-25 NOTE — PROGRESS NOTES
Identified pt with two pt identifiers (name and ). Reviewed chart in preparation for visit and have obtained necessary documentation. Manav Rob is a 63 y.o. female  Chief Complaint   Patient presents with    Surgical Follow-up     Rt Total Hip     Visit Vitals  /69 (BP 1 Location: Right upper arm, BP Patient Position: Sitting, BP Cuff Size: Large adult)   Pulse 77   Temp 98 °F (36.7 °C) (Tympanic)   Resp 18   Ht 5' (1.524 m)   Wt 145 lb (65.8 kg)   LMP 2010   SpO2 100%   BMI 28.32 kg/m²     1. Have you been to the ER, urgent care clinic since your last visit? Hospitalized since your last visit? No    2. Have you seen or consulted any other health care providers outside of the [address] [place] [address] since your last visit? Include any pap smears or colon screening.  No

## 2023-04-25 NOTE — PROGRESS NOTES
is here for a follow up visit from a total hip arthroplasty on the right side. The patient is doing well, with no medical complications since discharge. Pain has been appropriate since surgery,and the patient is progressing well with physical therapy. Current Outpatient Medications on File Prior to Visit   Medication Sig Dispense Refill    aspirin delayed-release 325 mg tablet Take 1 Tablet by mouth two (2) times a day for 30 days. 60 Tablet 0    famotidine (PEPCID) 20 mg tablet Take 1 Tablet by mouth two (2) times a day for 30 days. 60 Tablet 0    celecoxib (CELEBREX) 200 mg capsule Take 1 Capsule by mouth two (2) times a day for 30 days. 60 Capsule 0    cholecalciferol (VITAMIN D3) 25 mcg (1,000 unit) cap Take 1 Capsule by mouth daily. losartan (COZAAR) 100 mg tablet Take 1 Tablet by mouth daily. 90 Tablet 2    meclizine (ANTIVERT) 25 mg tablet TAKE 1 TABLET BY MOUTH THREE (3) TIMES DAILY AS NEEDED FOR DIZZINESS FOR UP TO 10 DAYS. butalbital-acetaminophen-caffeine (FIORICET, ESGIC) -40 mg per tablet TAKE 1 TABLET BY MOUTH EVERY 4 HOURS AS NEEDED 30 Tablet 0    hydroCHLOROthiazide (HYDRODIURIL) 25 mg tablet One qd 90 Tablet 3    rosuvastatin (CRESTOR) 20 mg tablet Take 1 Tablet by mouth daily. 90 Tablet 3    metFORMIN (GLUCOPHAGE) 1,000 mg tablet Take 1 Tablet by mouth daily. 90 Tablet 3    brimonidine (ALPHAGAN) 0.2 % ophthalmic solution INSTILL 1 DROP IN EACH EYE TWICE A DAY      hydrocortisone (HYTONE) 2.5 % topical cream Apply  to affected area two (2) times a day. use thin layer 30 g 0    timolol (TIMOPTIC) 0.5 % ophthalmic solution Administer 1 Drop to both eyes daily. 10 mL 4    Blood-Glucose Meter monitoring kit Check fsbs every day 250.02 (Patient not taking: Reported on 3/27/2023) 1 Kit 0     No current facility-administered medications on file prior to visit.        ROS:  General: denies agitation, major chest pain, unexpected weakness  Hip pain is managed with tramadol. Skin: healing wound is without issue. Strength: appropriate weakness of involved extremity is resolving since surgery      Physical Examination:    Blood pressure 101/69, pulse 77, temperature 98 °F (36.7 °C), temperature source Tympanic, resp. rate 18, height 5' (1.524 m), weight 145 lb (65.8 kg), last menstrual period 11/29/2010, SpO2 100 %. Skin: no significant drainage, no wound dehiscence. Sensation intact to light touch at level of wound and distally. Lateral femoral cutaneous nerve function is intact. Strength is 4/5 with hip flexion and abduction  Leg lengths are clinically equal  Distal swelling is noted, but appropriate for postoperative course  Distal capillary refill less than 2 seconds      Imaging:    Postoperative xrays are reviewed, they indicate a right total hip arthroplasty in excellent position without evidence of loosening or failure. Leg lengths are equal through the hip.       Assessment: Status post right total hip arthroplasty    Plan:  Patient will continue physical therapy, with the goal of outpatient therapy and then home exercise program as soon as is possible  Wound care and dental prophylaxis instructions were reviewed  Continue to weight bear as tolerated without restriction, except to keep to the positions of comfort  Deep Venous Thrombosis Prophylaxis: aspirin    Follow up will be 4 weeks,  right hip xrays on follow up

## 2023-04-30 RX ORDER — LANCETS 30 GAUGE
EACH MISCELLANEOUS
COMMUNITY
Start: 2020-02-05

## 2023-04-30 RX ORDER — FAMOTIDINE 20 MG/1
20 TABLET, FILM COATED ORAL 2 TIMES DAILY
Qty: 60 TABLET | Refills: 0 | COMMUNITY
Start: 2023-04-10 | End: 2023-05-10

## 2023-04-30 RX ORDER — CELECOXIB 200 MG/1
200 CAPSULE ORAL 2 TIMES DAILY
Qty: 60 CAPSULE | Refills: 0 | COMMUNITY
Start: 2023-04-10 | End: 2023-05-10

## 2023-05-09 ENCOUNTER — HOSPITAL ENCOUNTER (OUTPATIENT)
Facility: HOSPITAL | Age: 63
Setting detail: RECURRING SERIES
Discharge: HOME | End: 2023-05-12
Payer: COMMERCIAL

## 2023-05-09 PROCEDURE — 97110 THERAPEUTIC EXERCISES: CPT

## 2023-05-09 PROCEDURE — 97162 PT EVAL MOD COMPLEX 30 MIN: CPT

## 2023-05-09 NOTE — THERAPY EVALUATION
[facility]  [address] [place]  Phone: [phone]   Fax: [phone]          PHYSICAL THERAPY - MEDICARE EVALUATION/PLAN OF CARE NOTE (updated 3/23)      Date: 5/10/2023          Patient Name:  Barbara Yi :  1960   Medical   Diagnosis:  Aftercare following right hip joint replacement surgery (Z47.1, Z96.641) Treatment Diagnosis:  M25.551  RIGHT HIP PAIN    Referral Source:  Hannah Marie DO Provider #:  [id number]                Insurance: Payor: Caren Curling / Plan: [employer] / Product Type: *No Product type* /      Patient  verified yes     Visit #   Current  / Total 1 24   Time   In / Out 1:28 pm 2:17 PM   Total Treatment Time 49 min   Total Timed Codes 10 min   1:1 Treatment Time 10 min      [facility] Totals Reminder:  bill using total billable   min of TIMED therapeutic procedures and modalities. 8-22 min = 1 unit; 23-37 min = 2 units; 38-52 min = 3 units;  53-67 min = 4 units; 68-82 min = 5 units     SUBJECTIVE  Pain Level (0-10 scale): /10  []constant [x]intermittent [x]improving []worsening []no change since onset    Any medication changes, allergies to medications, adverse drug reactions, diagnosis change, or new procedure performed?: [x] No    [] Yes (see summary sheet for update)  Medications: Verified on Patient Summary List    Subjective functional status/changes:     Patient presents to skilled physical therapy with chief complaint of right hip pain s/p right THR on 4/10/2023. Patient reports pain has been tolerable since surgery, notes numbness around incision and anterior grown. Patient had home health 2x/week for the past several weeks, noting last visit was ~2 weeks ago. Reports she had first follow up with surgeon on 2023. Reports she is progressing as expected and Xrays of right hip showed all hardware in excellent position.  Patient notes she has been partially

## 2023-05-19 DIAGNOSIS — Z47.1 AFTERCARE FOLLOWING RIGHT HIP JOINT REPLACEMENT SURGERY: Primary | ICD-10-CM

## 2023-05-19 DIAGNOSIS — Z96.641 AFTERCARE FOLLOWING RIGHT HIP JOINT REPLACEMENT SURGERY: Primary | ICD-10-CM

## 2023-05-23 ENCOUNTER — OFFICE VISIT (OUTPATIENT)
Age: 63
End: 2023-05-23

## 2023-05-23 ENCOUNTER — HOSPITAL ENCOUNTER (OUTPATIENT)
Facility: HOSPITAL | Age: 63
Setting detail: RECURRING SERIES
Discharge: HOME | End: 2023-05-26
Payer: COMMERCIAL

## 2023-05-23 ENCOUNTER — HOSPITAL ENCOUNTER (OUTPATIENT)
Facility: HOSPITAL | Age: 63
Discharge: HOME | End: 2023-05-26
Payer: COMMERCIAL

## 2023-05-23 VITALS
TEMPERATURE: 98 F | SYSTOLIC BLOOD PRESSURE: 122 MMHG | HEART RATE: 69 BPM | WEIGHT: 158.8 LBS | RESPIRATION RATE: 17 BRPM | HEIGHT: 60 IN | BODY MASS INDEX: 31.18 KG/M2 | OXYGEN SATURATION: 100 % | DIASTOLIC BLOOD PRESSURE: 70 MMHG

## 2023-05-23 DIAGNOSIS — Z96.641 AFTERCARE FOLLOWING RIGHT HIP JOINT REPLACEMENT SURGERY: ICD-10-CM

## 2023-05-23 DIAGNOSIS — Z47.1 AFTERCARE FOLLOWING RIGHT HIP JOINT REPLACEMENT SURGERY: ICD-10-CM

## 2023-05-23 DIAGNOSIS — Z96.641 PRESENCE OF RIGHT ARTIFICIAL HIP JOINT: Primary | ICD-10-CM

## 2023-05-23 PROCEDURE — 97110 THERAPEUTIC EXERCISES: CPT

## 2023-05-23 PROCEDURE — 73502 X-RAY EXAM HIP UNI 2-3 VIEWS: CPT

## 2023-05-23 PROCEDURE — 99024 POSTOP FOLLOW-UP VISIT: CPT | Performed by: ORTHOPAEDIC SURGERY

## 2023-05-23 RX ORDER — TRAMADOL HYDROCHLORIDE 50 MG/1
TABLET ORAL
COMMUNITY
Start: 2023-04-25 | End: 2023-05-23 | Stop reason: SDUPTHER

## 2023-05-23 RX ORDER — TRAMADOL HYDROCHLORIDE 50 MG/1
50 TABLET ORAL EVERY 6 HOURS PRN
Qty: 28 TABLET | Refills: 0 | Status: SHIPPED | OUTPATIENT
Start: 2023-05-23 | End: 2023-05-30

## 2023-05-23 ASSESSMENT — PATIENT HEALTH QUESTIONNAIRE - PHQ9
2. FEELING DOWN, DEPRESSED OR HOPELESS: 0
SUM OF ALL RESPONSES TO PHQ QUESTIONS 1-9: 0
SUM OF ALL RESPONSES TO PHQ QUESTIONS 1-9: 0
1. LITTLE INTEREST OR PLEASURE IN DOING THINGS: 0
SUM OF ALL RESPONSES TO PHQ QUESTIONS 1-9: 0
SUM OF ALL RESPONSES TO PHQ9 QUESTIONS 1 & 2: 0
SUM OF ALL RESPONSES TO PHQ QUESTIONS 1-9: 0

## 2023-05-23 NOTE — PROGRESS NOTES
is here for a follow up visit from a total hip arthroplasty on the right side. The patient is doing well, with no medical complications since discharge. Pain has been appropriate since surgery,and the patient is progressing well with physical therapy. She is concerned now about how she will return to work in 6 weeks, though she is still in PT and progressing. She states her job involves walking. Current Outpatient Medications on File Prior to Visit   Medication Sig Dispense Refill    Lancets MISC Check fsbs every day 250.02      Lancets MISC One Touch Ultra 2 meter. Check blood sugars daily. ICD-10 E11.620      aspirin 81 MG EC tablet Take by mouth daily      brimonidine (ALPHAGAN) 0.2 % ophthalmic solution INSTILL 1 DROP IN EACH EYE TWICE A DAY      hydroCHLOROthiazide (HYDRODIURIL) 25 MG tablet One qd      losartan (COZAAR) 100 MG tablet Take by mouth daily      meclizine (ANTIVERT) 25 MG tablet TAKE 1 TABLET BY MOUTH THREE (3) TIMES DAILY AS NEEDED FOR DIZZINESS FOR UP TO 10 DAYS.       metFORMIN (GLUCOPHAGE) 1000 MG tablet Take by mouth daily      rosuvastatin (CRESTOR) 20 MG tablet Take by mouth daily      timolol (TIMOPTIC) 0.5 % ophthalmic solution Apply 1 drop to eye daily      celecoxib (CELEBREX) 200 MG capsule Take 1 capsule by mouth 2 times daily 60 capsule 0    butalbital-acetaminophen-caffeine (FIORICET, ESGIC) -40 MG per tablet TAKE 1 TABLET BY MOUTH EVERY 4 HOURS AS NEEDED (Patient not taking: Reported on 5/23/2023)      diclofenac sodium (VOLTAREN) 1 % GEL Apply topically 4 times daily (Patient not taking: Reported on 5/23/2023)      diclofenac (VOLTAREN) 75 MG EC tablet Take by mouth 2 times daily (Patient not taking: Reported on 5/23/2023)      esomeprazole ([employer]) 40 MG delayed release capsule ceived the following from [address] [address] [facility]: Outside name: esomeprazole (NEXIUM) 40 mg capsule (Patient not taking: Reported on 5/23/2023)      hydrocortisone 2.5 % cream

## 2023-05-23 NOTE — TELEPHONE ENCOUNTER
Patient is requesting refills on her diclofenac 1% gel and tramadol.  Her preferred pharmacy is [facility]/PHARMACY #[id number]- [address], [address] [place], [place] [phone]

## 2023-05-23 NOTE — PROGRESS NOTES
Jake Johnson is a 63 y.o. female  Chief Complaint   Patient presents with    Post-Op Check     Rt Total Hip     Ht 5' (1.524 m)   Wt 158 lb 12.8 oz (72 kg)   BMI 31.01 kg/m²   1. Have you been to the ER, urgent care clinic since your last visit? Hospitalized since your last visit? No    2. Have you seen or consulted any other health care providers outside of the [address] [place] [address] since your last visit? Include any pap smears or colon screening.  No

## 2023-05-23 NOTE — PROGRESS NOTES
PHYSICAL THERAPY - MEDICARE DAILY TREATMENT NOTE (updated 3/23)      Date: 2023          Patient Name:  Lluvia Gan :  1960   Medical   Diagnosis:  Aftercare following right hip joint replacement surgery (Z47.1, Z96.641) Treatment Diagnosis:  M25.551  RIGHT HIP PAIN    Referral Source:  Sri Staff, DO Insurance:   Payor: Janet Melvin [facility] / Plan:  / Product Type: *No Product type* /                     Patient  verified yes     Visit #   Current  / Total 2 24   Time   In / Out 947 1036   Total Treatment Time 49   Total Timed Codes 49   1:1 Treatment Time 31      [facility] Totals Reminder:  bill using total billable   min of TIMED therapeutic procedures and modalities. 8-22 min = 1 unit; 23-37 min = 2 units; 38-52 min = 3 units; 53-67 min = 4 units; 68-82 min = 5 units            SUBJECTIVE    Pain Level (0-10 scale): 6/10    Any medication changes, allergies to medications, adverse drug reactions, diagnosis change, or new procedure performed?: [x] No    [] Yes (see summary sheet for update)  Medications: Verified on Patient Summary List    Subjective functional status/changes:     Patient noted they have been doing alright since previous treatment session, noted they continue to work on their exercises everyday and continue to note pain and stiffness. Patient noted they had a bad day yesterday but weren't too sure why. OBJECTIVE      Therapeutic Procedures: Tx Min Billable or 1:1 Min (if diff from Tx Min) Procedure, Rationale, Specifics   [id number] Therapeutic Exercise (timed):  increase ROM, strength, coordination, balance, and proprioception to improve patient's ability to progress to PLOF and address remaining functional goals.  (see flow sheet as applicable)     Details if applicable:                         49 31    Total Total       [x]  Patient Education billed concurrently with other procedures   [x] Review HEP    [] Progressed/Changed HEP, detail:    [] Other detail:

## 2023-06-02 ENCOUNTER — APPOINTMENT (OUTPATIENT)
Facility: HOSPITAL | Age: 63
End: 2023-06-02
Payer: COMMERCIAL

## 2023-06-07 ENCOUNTER — HOSPITAL ENCOUNTER (OUTPATIENT)
Facility: HOSPITAL | Age: 63
Setting detail: RECURRING SERIES
Discharge: HOME | End: 2023-06-10
Payer: COMMERCIAL

## 2023-06-07 PROCEDURE — 97110 THERAPEUTIC EXERCISES: CPT

## 2023-06-08 NOTE — PROGRESS NOTES
financial reasons, plan to attempt to move ahead with 1x/week for next 4 weeks following today's treatment session. Patient has achieved 0/8 goals set for treatment with progress being made towards final goals for rehab. Patient will continue to benefit from skilled PT / OT services to modify and progress therapeutic interventions, analyze and address functional mobility deficits, analyze and address ROM deficits, analyze and address strength deficits, analyze and address soft tissue restrictions, analyze and cue for proper movement patterns, and analyze and modify for postural abnormalities to address functional deficits and attain remaining goals.     Objective/Functional Outcome Measure: Hip FOTO Score: 61/100  Single limb stance, EO: R >= 30 seconds (2 second), L = 17 second  (1 seconds)  5x STS: 15 seconds  30second STS: 8 repetitions   TU.4 s (17.32) seconds (no use of SPC)  2mwt: 4.5 laps (3.25) laps gait speed = 1.03 (.75 m/s) (SPC)     Hip flexion A/PROM:  R                     L  AROM:                        89 (65) deg             120 deg             PROM:                        111(105) deg           128  deg                   LOWER QUARTER                            MUSCLE STRENGTH  KEY                                                                                         R                      L  0 - No Contraction                   Hip flexion                               4                      4           1- Trace                                   Hip Abduction                          3+                3+  2 - Poor                                   Hip Adduction                          4+                    4  3 - Fair                                     Hip Extension                          nt                     nt                                   4 - Good                                  Knee flexion                            5                      5  5 - Normal
Patient has been seen for 3 visits and has made improvements with single limb stability, functional strength, community ambulation speed, lower quarter strength, hip AROM and functional outcome measures. Patient demonstrated improved single limb stability during today's reassessment (R>=30 seconds;L=17 seconds v R=2 seconds;L=1 seconds previously). Patient also demonstrated improved functional strength during their 5xSTS test (15 seconds v 17.62 seconds previously). Patient also noted improved lower quarter strength globally and community ambulation speed with their TUG (13.4 seconds v 17.32 seconds previously) and with their 2MWT (1.03 m/s v .75 m/s previously). Patient also noted improved hip AROM with flexion today (89 degrees v 65 degrees previously). Patient also demonstrated an improved FOTO score (61/100 v 58/100 previously) demonstrating improved subjective functional outcomes. Patient progress has been slow due to decreased attendance to therapy sessions primarily due to financial reasons, plan to attempt to move ahead with 1x/week for next 4 weeks following today's treatment session. Patient has achieved 0/8 goals set for treatment with progress being made towards final goals for rehab. Patient will continue to benefit from skilled PT / OT services to modify and progress therapeutic interventions, analyze and address functional mobility deficits, analyze and address ROM deficits, analyze and address strength deficits, analyze and address soft tissue restrictions, analyze and cue for proper movement patterns, and analyze and modify for postural abnormalities to address functional deficits and attain remaining goals. Progress toward goals / Updated goals:  []  See Progress Note/Recertification    Short Term Goals:  To be accomplished in 10-12 treatments  1) The patient will be independent with introductory HEP with no v.c. - progressing  2) The patient will demonstrate hip flexion AROM to a minimum of 90

## 2023-06-14 ENCOUNTER — HOSPITAL ENCOUNTER (OUTPATIENT)
Facility: HOSPITAL | Age: 63
Setting detail: RECURRING SERIES
End: 2023-06-14
Payer: COMMERCIAL

## 2023-07-05 ENCOUNTER — OFFICE VISIT (OUTPATIENT)
Age: 63
End: 2023-07-05

## 2023-07-05 VITALS
SYSTOLIC BLOOD PRESSURE: 122 MMHG | OXYGEN SATURATION: 98 % | DIASTOLIC BLOOD PRESSURE: 70 MMHG | HEART RATE: 75 BPM | RESPIRATION RATE: 17 BRPM | HEIGHT: 60 IN | WEIGHT: 158 LBS | TEMPERATURE: 98.1 F | BODY MASS INDEX: 31.02 KG/M2

## 2023-07-05 DIAGNOSIS — Z96.641 PRESENCE OF RIGHT ARTIFICIAL HIP JOINT: Primary | ICD-10-CM

## 2023-07-05 PROCEDURE — 99024 POSTOP FOLLOW-UP VISIT: CPT | Performed by: ORTHOPAEDIC SURGERY

## 2023-07-05 ASSESSMENT — PATIENT HEALTH QUESTIONNAIRE - PHQ9
SUM OF ALL RESPONSES TO PHQ QUESTIONS 1-9: 0
2. FEELING DOWN, DEPRESSED OR HOPELESS: 0
SUM OF ALL RESPONSES TO PHQ QUESTIONS 1-9: 0
SUM OF ALL RESPONSES TO PHQ9 QUESTIONS 1 & 2: 0
1. LITTLE INTEREST OR PLEASURE IN DOING THINGS: 0
SUM OF ALL RESPONSES TO PHQ QUESTIONS 1-9: 0
SUM OF ALL RESPONSES TO PHQ QUESTIONS 1-9: 0

## 2023-07-05 NOTE — PROGRESS NOTES
is here for a follow up visit from a total hip arthroplasty on the right side. The patient is doing well, with no medical complications since discharge. Pain has been appropriate since surgery,and the patient is progressing well with physical therapy. Current Outpatient Medications on File Prior to Visit   Medication Sig Dispense Refill    diclofenac sodium (VOLTAREN) 1 % GEL Apply 4 g topically 4 times daily 4 g 3    Lancets MISC Check fsbs every day 250.02      Lancets MISC One Touch Ultra 2 meter. Check blood sugars daily. ICD-10 E11.620      aspirin 81 MG EC tablet Take by mouth daily      brimonidine (ALPHAGAN) 0.2 % ophthalmic solution INSTILL 1 DROP IN EACH EYE TWICE A DAY      hydroCHLOROthiazide (HYDRODIURIL) 25 MG tablet One qd      losartan (COZAAR) 100 MG tablet Take by mouth daily      meclizine (ANTIVERT) 25 MG tablet TAKE 1 TABLET BY MOUTH THREE (3) TIMES DAILY AS NEEDED FOR DIZZINESS FOR UP TO 10 DAYS.       metFORMIN (GLUCOPHAGE) 1000 MG tablet Take by mouth daily      rosuvastatin (CRESTOR) 20 MG tablet Take by mouth daily      timolol (TIMOPTIC) 0.5 % ophthalmic solution Apply 1 drop to eye daily      celecoxib (CELEBREX) 200 MG capsule Take 1 capsule by mouth 2 times daily 60 capsule 0    butalbital-acetaminophen-caffeine (FIORICET, ESGIC) -40 MG per tablet TAKE 1 TABLET BY MOUTH EVERY 4 HOURS AS NEEDED (Patient not taking: Reported on 5/23/2023)      diclofenac (VOLTAREN) 75 MG EC tablet Take by mouth 2 times daily (Patient not taking: Reported on 5/23/2023)      esomeprazole ([place]) 40 MG delayed release capsule ceived the following from [address] - [facility]: Outside name: esomeprazole (NEXIUM) 40 mg capsule (Patient not taking: Reported on 5/23/2023)      hydrocortisone 2.5 % cream Apply topically 2 times daily (Patient not taking: Reported on 5/23/2023)      simvastatin (ZOCOR) 40 MG tablet ceived the following from [facility]: Outside name:

## 2023-07-07 NOTE — TELEPHONE ENCOUNTER
Patient states she is calling in reference to being out of Diabetic Medication & needs refill Asap. Please call if any questions.  Thank you        Patient reminded of [address] on refills

## 2023-07-24 RX ORDER — ROSUVASTATIN CALCIUM 20 MG/1
TABLET, COATED ORAL
Qty: 90 TABLET | Refills: 3 | Status: SHIPPED | OUTPATIENT
Start: 2023-07-24

## 2023-08-17 RX ORDER — HYDROCHLOROTHIAZIDE 25 MG/1
TABLET ORAL
Qty: 90 TABLET | Refills: 3 | Status: SHIPPED | OUTPATIENT
Start: 2023-08-17

## 2023-09-20 ENCOUNTER — TELEPHONE (OUTPATIENT)
Age: 63
End: 2023-09-20

## 2023-09-20 NOTE — TELEPHONE ENCOUNTER
----- Message from Josse Kathy sent at 9/20/2023  4:06 PM EDT -----  Subject: Appointment Request    Reason for Call: Established Patient Appointment needed: Routine Physical   Exam    QUESTIONS    Reason for appointment request? No appointments available during search     Additional Information for Provider? Patient is scheduled for her annual   physical with Dr. Andrey Burnette on 9/26/23 and she wanted to see if there would be   any way to change this to 9/25/23 due to work and she is off on Mondays.    Please call patient to advise.   ---------------------------------------------------------------------------  --------------  Kell LILLY  [phone]; OK to leave message on voicemail  ---------------------------------------------------------------------------  --------------  SCRIPT ANSWERS

## 2023-09-23 ASSESSMENT — ENCOUNTER SYMPTOMS
EYES NEGATIVE: 1
RESPIRATORY NEGATIVE: 1
ALLERGIC/IMMUNOLOGIC NEGATIVE: 1
GASTROINTESTINAL NEGATIVE: 1

## 2023-09-23 NOTE — PROGRESS NOTES
HISTORY OF PRESENT ILLNESS   Miguel Angel Basilio   is a 66 y.o.  female. hx dm-2 htn hld tension headaches and CPE    S/p right SEGUNDO this year-Dr Rudolph Reilly  Last a1c 6.9  LDL 79  Fsbs on metformin-none recent-needs glucometer    Back to work  Mild SOB with exercise but no chest pain  Gyn MD  Nonsmoker, no etoh     1 son ,lives alone    [place] at work but not exercising  Having some trouble falling asleep      Last OV  Preop for right SEGUNDO scheduled 4/10/23 Dr Rudolph Reilly  PAT on 4/3  Righ hip pain started in October last year--prior to then very mobile and no cp or sob  Able to walk up stairs today without any chest pain  Fsbs-none recently  No f/c cp sob syncope     Last a1c 6.8  Fsbs -nonre recent  No cp sob   No difficulty in the past with surgery or anesthesia     Activity level          Patient Active Problem List     Patient Active Problem List    Diagnosis Date Noted    Osteoarthritis of left knee, unspecified osteoarthritis type 04/11/2023    Unilateral primary osteoarthritis, right hip 04/10/2023    Peripheral neuropathy 01/30/2012    HTN (hypertension) 06/19/2009    Osteoarthritis 06/19/2009    Diabetic gastroparesis ([address]) 06/19/2009    Hypercholesterolemia 06/19/2009    Diabetes mellitus ([address]) 06/19/2009    Bronchitis 06/19/2009     Current Outpatient Medications   Medication Sig Dispense Refill    hydroCHLOROthiazide (HYDRODIURIL) 25 MG tablet TAKE 1 TABLET BY MOUTH EVERY DAY 90 tablet 3    rosuvastatin (CRESTOR) 20 MG tablet TAKE 1 TABLET BY MOUTH EVERY DAY 90 tablet 3    metFORMIN (GLUCOPHAGE) 1000 MG tablet TAKE 1 TABLET BY MOUTH EVERY DAY 90 tablet 3    diclofenac sodium (VOLTAREN) 1 % GEL Apply 4 g topically 4 times daily 4 g 3    Lancets MISC Check fsbs every day 250.02      celecoxib (CELEBREX) 200 MG capsule Take 1 capsule by mouth 2 times daily 60 capsule 0    Lancets MISC One Touch Ultra 2 meter. Check blood sugars daily.  ICD-10 E11.620      aspirin 81 MG EC tablet Take by mouth daily

## 2023-09-25 ENCOUNTER — OFFICE VISIT (OUTPATIENT)
Age: 63
End: 2023-09-25
Payer: COMMERCIAL

## 2023-09-25 VITALS
BODY MASS INDEX: 29.33 KG/M2 | RESPIRATION RATE: 16 BRPM | TEMPERATURE: 96.8 F | WEIGHT: 149.4 LBS | HEIGHT: 60 IN | HEART RATE: 64 BPM | OXYGEN SATURATION: 98 % | DIASTOLIC BLOOD PRESSURE: 70 MMHG | SYSTOLIC BLOOD PRESSURE: 100 MMHG

## 2023-09-25 DIAGNOSIS — Z00.00 ROUTINE PHYSICAL EXAMINATION: ICD-10-CM

## 2023-09-25 DIAGNOSIS — I10 HYPERTENSION, UNSPECIFIED TYPE: ICD-10-CM

## 2023-09-25 DIAGNOSIS — G47.9 SLEEP DISTURBANCE: ICD-10-CM

## 2023-09-25 DIAGNOSIS — E78.5 HYPERLIPIDEMIA, UNSPECIFIED HYPERLIPIDEMIA TYPE: ICD-10-CM

## 2023-09-25 DIAGNOSIS — E11.9 TYPE 2 DIABETES MELLITUS WITHOUT COMPLICATION, WITHOUT LONG-TERM CURRENT USE OF INSULIN (HCC): Primary | ICD-10-CM

## 2023-09-25 PROCEDURE — 99396 PREV VISIT EST AGE 40-64: CPT | Performed by: INTERNAL MEDICINE

## 2023-09-25 PROCEDURE — 3078F DIAST BP <80 MM HG: CPT | Performed by: INTERNAL MEDICINE

## 2023-09-25 PROCEDURE — 3074F SYST BP LT 130 MM HG: CPT | Performed by: INTERNAL MEDICINE

## 2023-09-25 RX ORDER — GLUCOSAMINE HCL/CHONDROITIN SU 500-400 MG
CAPSULE ORAL
Qty: 50 STRIP | Refills: 5 | Status: SHIPPED | OUTPATIENT
Start: 2023-09-25

## 2023-09-25 RX ORDER — BLOOD-GLUCOSE METER
1 KIT MISCELLANEOUS DAILY
Qty: 1 KIT | Refills: 0 | Status: SHIPPED | OUTPATIENT
Start: 2023-09-25

## 2023-09-25 SDOH — ECONOMIC STABILITY: INCOME INSECURITY: HOW HARD IS IT FOR YOU TO PAY FOR THE VERY BASICS LIKE FOOD, HOUSING, MEDICAL CARE, AND HEATING?: NOT VERY HARD

## 2023-09-25 SDOH — ECONOMIC STABILITY: FOOD INSECURITY: WITHIN THE PAST 12 MONTHS, YOU WORRIED THAT YOUR FOOD WOULD RUN OUT BEFORE YOU GOT MONEY TO BUY MORE.: NEVER TRUE

## 2023-09-25 SDOH — ECONOMIC STABILITY: FOOD INSECURITY: WITHIN THE PAST 12 MONTHS, THE FOOD YOU BOUGHT JUST DIDN'T LAST AND YOU DIDN'T HAVE MONEY TO GET MORE.: NEVER TRUE

## 2023-09-25 SDOH — ECONOMIC STABILITY: HOUSING INSECURITY
IN THE LAST 12 MONTHS, WAS THERE A TIME WHEN YOU DID NOT HAVE A STEADY PLACE TO SLEEP OR SLEPT IN A SHELTER (INCLUDING NOW)?: NO

## 2023-09-25 NOTE — PROGRESS NOTES
1. \"Have you been to the ER, urgent care clinic since your last visit? Hospitalized since your last visit? \" No    2. \"Have you seen or consulted any other health care providers outside of the [address] [place] [address] since your last visit? \" No     3. For patients aged 43-73: Has the patient had a colonoscopy / FIT/ Cologuard? 2020 kimberly      If the patient is female:    4. For patients aged 43-66: Has the patient had a mammogram within the past 2 years? No      5. For patients aged 21-65: Has the patient had a pap smear?   No

## 2023-09-26 LAB
ALBUMIN SERPL-MCNC: 3.5 G/DL (ref 3.5–5)
ALBUMIN/GLOB SERPL: 1 (ref 1.1–2.2)
ALP SERPL-CCNC: 100 U/L (ref 45–117)
ALT SERPL-CCNC: 23 U/L (ref 12–78)
ANION GAP SERPL CALC-SCNC: 2 MMOL/L (ref 5–15)
AST SERPL-CCNC: 13 U/L (ref 15–37)
BILIRUB SERPL-MCNC: 0.4 MG/DL (ref 0.2–1)
BUN SERPL-MCNC: 23 MG/DL (ref 6–20)
BUN/CREAT SERPL: 27 (ref 12–20)
CALCIUM SERPL-MCNC: 9.1 MG/DL (ref 8.5–10.1)
CHLORIDE SERPL-SCNC: 110 MMOL/L (ref 97–108)
CHOLEST SERPL-MCNC: 144 MG/DL
CO2 SERPL-SCNC: 31 MMOL/L (ref 21–32)
CREAT SERPL-MCNC: 0.86 MG/DL (ref 0.55–1.02)
CREAT UR-MCNC: 67.2 MG/DL
EST. AVERAGE GLUCOSE BLD GHB EST-MCNC: 143 MG/DL
GLOBULIN SER CALC-MCNC: 3.5 G/DL (ref 2–4)
GLUCOSE SERPL-MCNC: 109 MG/DL (ref 65–100)
HBA1C MFR BLD: 6.6 % (ref 4–5.6)
HDLC SERPL-MCNC: 58 MG/DL
HDLC SERPL: 2.5 (ref 0–5)
HIV 1+2 AB+HIV1 P24 AG SERPL QL IA: NONREACTIVE
HIV 1/2 RESULT COMMENT: NORMAL
LDLC SERPL CALC-MCNC: 77 MG/DL (ref 0–100)
MICROALBUMIN UR-MCNC: 0.53 MG/DL
MICROALBUMIN/CREAT UR-RTO: 8 MG/G (ref 0–30)
POTASSIUM SERPL-SCNC: 3.9 MMOL/L (ref 3.5–5.1)
PROT SERPL-MCNC: 7 G/DL (ref 6.4–8.2)
SODIUM SERPL-SCNC: 143 MMOL/L (ref 136–145)
TRIGL SERPL-MCNC: 45 MG/DL
TSH SERPL DL<=0.05 MIU/L-ACNC: 1.04 UIU/ML (ref 0.36–3.74)
VLDLC SERPL CALC-MCNC: 9 MG/DL

## 2023-09-28 ENCOUNTER — CLINICAL DOCUMENTATION (OUTPATIENT)
Age: 63
End: 2023-09-28

## 2023-09-28 NOTE — PROGRESS NOTES
[facility] - [phone]  FreeStyle Test strips  Status: PA Response - ApprovedCreated: September 25th, 2023 [phone]: September 28th, 2023    Jayleen Holley [place]

## 2023-10-05 ENCOUNTER — TELEPHONE (OUTPATIENT)
Age: 63
End: 2023-10-05

## 2023-10-05 NOTE — TELEPHONE ENCOUNTER
Patient states she needs a call back to discuss Lab results & also to discuss Email on test needed to be done.        Patient also states she needs to know if Dr. Sarahi Patton Can do refills for her Eye drops from her Eye Dr.       Please call to discuss & advise between 3:30-4pm. Thank you

## 2023-10-06 NOTE — TELEPHONE ENCOUNTER
Spoke with patient. Advised of recent lab results and recommendations. Advised to contact eye doctor office for refills. Patient verbalized understanding. Per Dr. Mal Wyatt note 9/26/2023    \"None. Keep up the good work! Continue with current  diet and medications. Normal kidneys,liver, electrolytes, cholesterol, thyroid and urine protein levels. Your a1c has improved--good control of diabetes with metformin, diet and exercise. \"

## 2023-10-18 ENCOUNTER — TELEPHONE (OUTPATIENT)
Age: 63
End: 2023-10-18

## 2023-10-18 DIAGNOSIS — J06.9 UPPER RESPIRATORY TRACT INFECTION, UNSPECIFIED TYPE: Primary | ICD-10-CM

## 2023-10-18 RX ORDER — DOXYCYCLINE HYCLATE 100 MG
100 TABLET ORAL 2 TIMES DAILY
Qty: 14 TABLET | Refills: 0 | Status: SHIPPED | OUTPATIENT
Start: 2023-10-18 | End: 2023-10-25

## 2023-10-18 NOTE — TELEPHONE ENCOUNTER
Pt states that she has a cold. She has slight cough, sneezing but no fever. Pt has not taken COVID test.    Pt not able to do VV she states. Pt is asking for an antibiotic to be called into [facility] #[phone]    Pt is asking for a call back to let her know what doctor will do.

## 2023-11-01 RX ORDER — LOSARTAN POTASSIUM 100 MG/1
100 TABLET ORAL DAILY
Qty: 90 TABLET | Refills: 2 | Status: SHIPPED | OUTPATIENT
Start: 2023-11-01

## 2023-12-04 ENCOUNTER — TELEPHONE (OUTPATIENT)
Age: 63
End: 2023-12-04

## 2023-12-04 NOTE — TELEPHONE ENCOUNTER
Patient is requesting an antibiotic for her upcoming dental procedure on 12/13/23.  She would like the prescription to be sent to her preferred pharmacy of [address] [place] [address] [[id number]]

## 2023-12-05 RX ORDER — AMOXICILLIN AND CLAVULANATE POTASSIUM 500; 125 MG/1; MG/1
4 TABLET, FILM COATED ORAL ONCE
Qty: 4 TABLET | Refills: 2 | Status: SHIPPED | OUTPATIENT
Start: 2023-12-05 | End: 2023-12-05

## 2024-01-19 ENCOUNTER — TELEPHONE (OUTPATIENT)
Age: 64
End: 2024-01-19

## 2024-01-19 RX ORDER — BUTALBITAL, ACETAMINOPHEN AND CAFFEINE 50; 325; 40 MG/1; MG/1; MG/1
TABLET ORAL
Qty: 30 TABLET | Refills: 0 | Status: SHIPPED | OUTPATIENT
Start: 2024-01-19

## 2024-01-19 NOTE — TELEPHONE ENCOUNTER
Patient states she needs refill done thru [facility] on file for her Butalbital-acetaminophen-caffeine (FIORICET, ESGIC) -40 MG per tablet patient states she needs for headaches.. Please call if any questions. Thank you      Patient reminded of [address] on refills    Patient last seen 09/25/23

## 2024-01-24 ENCOUNTER — CLINICAL DOCUMENTATION (OUTPATIENT)
Age: 64
End: 2024-01-24

## 2024-01-24 NOTE — PROGRESS NOTES
Glendy REYES Triage Call  Patient is having stomach pain, migraine and off balance. During transfer patient hung up.    Attempted to call patient back left her a message to call the office directly.    Dara Carlton, CMA

## 2024-02-05 ENCOUNTER — TELEPHONE (OUTPATIENT)
Age: 64
End: 2024-02-05

## 2024-02-05 NOTE — TELEPHONE ENCOUNTER
Pt scheduled acute appt (soonest in practice for in office) for stomach pain and cramping, and gas.    Appt scheduled for Monday with dr couch.    Pt asks for advise to hold over until she can be seen if possible.    Please call to advise what would be recommended  Best time to reach pt is around 3:30 pm

## 2024-02-05 NOTE — TELEPHONE ENCOUNTER
Pt needs antibiotics for a dental appt that she has for this Wednesday called to [facility] on file.

## 2024-02-06 RX ORDER — AMOXICILLIN AND CLAVULANATE POTASSIUM 500; 125 MG/1; MG/1
4 TABLET, FILM COATED ORAL ONCE
Qty: 4 TABLET | Refills: 2 | Status: SHIPPED | OUTPATIENT
Start: 2024-02-06 | End: 2024-02-06

## 2024-02-07 NOTE — PROGRESS NOTES
HISTORY OF PRESENT ILLNESS  Candie Miller is a 63 y.o. female.  HPI    Pt of Dr Correa. She presents for acute care.    C/o bloating, abdominal pain, gas, discomfort, burning/reflux x 2 weeks fluctuating in severity though she feels like she is doing better today  Reports 1 episode of diarrhea and 2 episode of vomiting, no recurrence   Denies constipation  States she is having regular BM   She took imodium 1x had 1 episode of diarrhea  She thinks this might be food-related   States she ate apple sauce and oat meal today and she felt fine feels like she is doing better now  On exam : epigastric tenderness  Ordered KUB history of fecal stasis in the past  Will start 20mg prilosec daily of note she states that she think she took Prilosec sometime in the past  Advised BRAT diet, clear fluids     If sx persist we will do CT     H/o gastroparesis  Colonoscopy 11/20- sigmoid diverticulosis    She is diabetic  No BS checks   She is on metformin once a day 1000 mg    History of diabetic gastroparesis in her records though she cannot give me any detail not    Reviewed last labs A1c 6.6    History of hypertension controlled on losartan hydrochlorothiazide        Patient Active Problem List   Diagnosis    HTN (hypertension)    Peripheral neuropathy    Osteoarthritis    Diabetic gastroparesis (HCC)    Hypercholesterolemia    Diabetes mellitus (HCC)    Bronchitis    Unilateral primary osteoarthritis, right hip    Osteoarthritis of left knee, unspecified osteoarthritis type     Current Outpatient Medications   Medication Sig Dispense Refill    butalbital-acetaminophen-caffeine (FIORICET, ESGIC) -40 MG per tablet One tab q6 hours prn headache 30 tablet 0    losartan (COZAAR) 100 MG tablet TAKE 1 TABLET BY MOUTH EVERY DAY 90 tablet 2    vitamin D-3 (CHOLECALCIFEROL) 125 MCG (5000 UT) TABS Take 1,000 Units by mouth daily      glucose monitoring kit 1 kit by Does not apply route daily 1 kit 0    blood glucose monitor strips

## 2024-02-09 ENCOUNTER — TELEPHONE (OUTPATIENT)
Age: 64
End: 2024-02-09

## 2024-02-09 NOTE — TELEPHONE ENCOUNTER
Left message for patient to schedule mammogram ordered by PCP  Requested patient to return call to panel manager at [phone] to schedule mammogram or notify that mammogram   has been completed at an outside facility.

## 2024-02-12 ENCOUNTER — TELEPHONE (OUTPATIENT)
Age: 64
End: 2024-02-12

## 2024-02-12 ENCOUNTER — OFFICE VISIT (OUTPATIENT)
Age: 64
End: 2024-02-12
Payer: COMMERCIAL

## 2024-02-12 ENCOUNTER — HOSPITAL ENCOUNTER (OUTPATIENT)
Facility: HOSPITAL | Age: 64
Discharge: HOME | End: 2024-02-15
Payer: COMMERCIAL

## 2024-02-12 VITALS
HEIGHT: 60 IN | SYSTOLIC BLOOD PRESSURE: 103 MMHG | OXYGEN SATURATION: 99 % | TEMPERATURE: 97.5 F | WEIGHT: 156 LBS | DIASTOLIC BLOOD PRESSURE: 69 MMHG | HEART RATE: 69 BPM | BODY MASS INDEX: 30.63 KG/M2 | RESPIRATION RATE: 24 BRPM

## 2024-02-12 DIAGNOSIS — E11.43 DIABETIC GASTROPARESIS (HCC): Primary | ICD-10-CM

## 2024-02-12 DIAGNOSIS — R10.13 DYSPEPSIA: ICD-10-CM

## 2024-02-12 DIAGNOSIS — I10 PRIMARY HYPERTENSION: ICD-10-CM

## 2024-02-12 DIAGNOSIS — E11.9 TYPE 2 DIABETES MELLITUS WITHOUT COMPLICATION, WITHOUT LONG-TERM CURRENT USE OF INSULIN (HCC): ICD-10-CM

## 2024-02-12 DIAGNOSIS — K31.84 DIABETIC GASTROPARESIS (HCC): Primary | ICD-10-CM

## 2024-02-12 PROCEDURE — 3074F SYST BP LT 130 MM HG: CPT | Performed by: INTERNAL MEDICINE

## 2024-02-12 PROCEDURE — 3078F DIAST BP <80 MM HG: CPT | Performed by: INTERNAL MEDICINE

## 2024-02-12 PROCEDURE — 99214 OFFICE O/P EST MOD 30 MIN: CPT | Performed by: INTERNAL MEDICINE

## 2024-02-12 PROCEDURE — 74018 RADEX ABDOMEN 1 VIEW: CPT

## 2024-02-12 RX ORDER — OMEPRAZOLE 20 MG/1
20 CAPSULE, DELAYED RELEASE ORAL
Qty: 30 CAPSULE | Refills: 1 | Status: SHIPPED | OUTPATIENT
Start: 2024-02-12

## 2024-02-12 NOTE — TELEPHONE ENCOUNTER
Patient Lvm asking for a C/B to learn whether or not it is safe for her to have an XR after having had a hip replacement. Patient was called back and a VM was left asking for a C/B.

## 2024-02-12 NOTE — PROGRESS NOTES
\"Have you been to the ER, urgent care clinic since your last visit?  Hospitalized since your last visit?\"    NO    “Have you seen or consulted any other health care providers outside of [facility] since your last visit?”    NO       Have you had a mammogram?”   NO     “Have you had a pap smear?”    NO

## 2024-02-13 ENCOUNTER — TELEPHONE (OUTPATIENT)
Age: 64
End: 2024-02-13

## 2024-02-13 LAB
ALBUMIN SERPL-MCNC: 3.8 G/DL (ref 3.5–5)
ALBUMIN/GLOB SERPL: 1 (ref 1.1–2.2)
ALP SERPL-CCNC: 84 U/L (ref 45–117)
ALT SERPL-CCNC: 21 U/L (ref 12–78)
ANION GAP SERPL CALC-SCNC: 5 MMOL/L (ref 5–15)
AST SERPL-CCNC: 15 U/L (ref 15–37)
BILIRUB SERPL-MCNC: 0.4 MG/DL (ref 0.2–1)
BUN SERPL-MCNC: 23 MG/DL (ref 6–20)
BUN/CREAT SERPL: 28 (ref 12–20)
CALCIUM SERPL-MCNC: 9.5 MG/DL (ref 8.5–10.1)
CHLORIDE SERPL-SCNC: 104 MMOL/L (ref 97–108)
CO2 SERPL-SCNC: 31 MMOL/L (ref 21–32)
CREAT SERPL-MCNC: 0.81 MG/DL (ref 0.55–1.02)
ERYTHROCYTE [DISTWIDTH] IN BLOOD BY AUTOMATED COUNT: 13.7 % (ref 11.5–14.5)
EST. AVERAGE GLUCOSE BLD GHB EST-MCNC: 140 MG/DL
GLOBULIN SER CALC-MCNC: 3.7 G/DL (ref 2–4)
GLUCOSE SERPL-MCNC: 82 MG/DL (ref 65–100)
HBA1C MFR BLD: 6.5 % (ref 4–5.6)
HCT VFR BLD AUTO: 40.4 % (ref 35–47)
HGB BLD-MCNC: 13.7 G/DL (ref 11.5–16)
MCH RBC QN AUTO: 29.7 PG (ref 26–34)
MCHC RBC AUTO-ENTMCNC: 33.9 G/DL (ref 30–36.5)
MCV RBC AUTO: 87.4 FL (ref 80–99)
NRBC # BLD: 0 K/UL (ref 0–0.01)
NRBC BLD-RTO: 0 PER 100 WBC
PLATELET # BLD AUTO: 294 K/UL (ref 150–400)
PMV BLD AUTO: 11.8 FL (ref 8.9–12.9)
POTASSIUM SERPL-SCNC: 3.6 MMOL/L (ref 3.5–5.1)
PROT SERPL-MCNC: 7.5 G/DL (ref 6.4–8.2)
RBC # BLD AUTO: 4.62 M/UL (ref 3.8–5.2)
SODIUM SERPL-SCNC: 140 MMOL/L (ref 136–145)
TSH SERPL DL<=0.05 MIU/L-ACNC: 1.71 UIU/ML (ref 0.36–3.74)
WBC # BLD AUTO: 9.8 K/UL (ref 3.6–11)

## 2024-02-13 NOTE — RESULT ENCOUNTER NOTE
Called pt, verified two pt identifiers.  Informed pt xray shows constipation- use mirlax, continue prilosec.   Let us know if no improvement.   No further questions.

## 2024-02-21 ENCOUNTER — TELEPHONE (OUTPATIENT)
Age: 64
End: 2024-02-21

## 2024-02-21 NOTE — TELEPHONE ENCOUNTER
----- Message from Gabriela Castillo sent at 2/21/2024 12:45 PM EST -----  Subject: Message to Provider    QUESTIONS  Information for Provider? Patient scheduled on 5/6/2024 for /u, however   requesting much sooner appt. last seen on 2/12/2024 by Waleska Membreno, but   prefers to see her Doctor Sunny  ---------------------------------------------------------------------------  --------------  CALL BACK INFO  [phone]; OK to leave message on voicemail  ---------------------------------------------------------------------------  --------------  SCRIPT ANSWERS  Relationship to Patient? Self

## 2024-02-21 NOTE — TELEPHONE ENCOUNTER
Could not find a sooner appt in March?     Pt is trying to get in sooner.  Please call pt back.    Please leave answer/new appt on her VM.

## 2024-02-21 NOTE — TELEPHONE ENCOUNTER
Left voicemail for patient to call back for scheduling:     Return in about 6 months (around 3/25/2024) for dm-2 htn hld.

## 2024-02-23 ENCOUNTER — TELEPHONE (OUTPATIENT)
Age: 64
End: 2024-02-23

## 2024-02-23 NOTE — TELEPHONE ENCOUNTER
Patient states she needs a call back to discuss getting appt on 3/15/24 for later that day after 2 pm when she gets off from work. Please call to discuss if this can be done. Thank you    Patient also has appt on 5/6/24 Not sure why she has 2 appts

## 2024-02-26 ENCOUNTER — HOSPITAL ENCOUNTER (OUTPATIENT)
Facility: HOSPITAL | Age: 64
Discharge: HOME | End: 2024-02-29
Attending: INTERNAL MEDICINE
Payer: COMMERCIAL

## 2024-02-26 VITALS — BODY MASS INDEX: 30.63 KG/M2 | HEIGHT: 60 IN | WEIGHT: 156 LBS

## 2024-02-26 PROCEDURE — 77067 SCR MAMMO BI INCL CAD: CPT

## 2024-02-27 ENCOUNTER — TELEPHONE (OUTPATIENT)
Age: 64
End: 2024-02-27

## 2024-02-27 NOTE — TELEPHONE ENCOUNTER
Pt returned call.      Pt states off next wed 3/6 , states that is a good day to use.        Please call pt back .  Ok to leave vm, pt gets off work at 2:30.

## 2024-02-28 NOTE — TELEPHONE ENCOUNTER
Left message with patient. No available appointments next Wed. Left message for patient to call back for potential rescheduling.

## 2024-03-04 NOTE — TELEPHONE ENCOUNTER
Patient already scheduled on 3/21; soonest available at this time. Patient was advised to go to UC per Dr. Correa if needed to be seen before this date. Patient also on wait list should sooner appointment become available.

## 2024-03-13 RX ORDER — MECLIZINE HYDROCHLORIDE 25 MG/1
TABLET ORAL
Qty: 30 TABLET | Refills: 1 | Status: SHIPPED | OUTPATIENT
Start: 2024-03-13

## 2024-03-14 ENCOUNTER — APPOINTMENT (OUTPATIENT)
Facility: HOSPITAL | Age: 64
End: 2024-03-14
Payer: COMMERCIAL

## 2024-03-14 ENCOUNTER — HOSPITAL ENCOUNTER (EMERGENCY)
Facility: HOSPITAL | Age: 64
Discharge: HOME | End: 2024-03-14
Payer: COMMERCIAL

## 2024-03-14 ENCOUNTER — OFFICE VISIT (OUTPATIENT)
Age: 64
End: 2024-03-14

## 2024-03-14 VITALS
RESPIRATION RATE: 18 BRPM | HEIGHT: 61 IN | SYSTOLIC BLOOD PRESSURE: 99 MMHG | HEART RATE: 60 BPM | TEMPERATURE: 98.1 F | WEIGHT: 153.6 LBS | DIASTOLIC BLOOD PRESSURE: 66 MMHG | BODY MASS INDEX: 29 KG/M2 | OXYGEN SATURATION: 100 %

## 2024-03-14 VITALS
DIASTOLIC BLOOD PRESSURE: 66 MMHG | BODY MASS INDEX: 29.51 KG/M2 | WEIGHT: 156.31 LBS | SYSTOLIC BLOOD PRESSURE: 134 MMHG | HEIGHT: 61 IN | OXYGEN SATURATION: 100 % | HEART RATE: 64 BPM | TEMPERATURE: 97.5 F | RESPIRATION RATE: 18 BRPM

## 2024-03-14 DIAGNOSIS — R26.89 BALANCE PROBLEM: ICD-10-CM

## 2024-03-14 DIAGNOSIS — H53.8 BLURRED VISION: ICD-10-CM

## 2024-03-14 DIAGNOSIS — R10.84 GENERALIZED ABDOMINAL PAIN: ICD-10-CM

## 2024-03-14 DIAGNOSIS — G56.01 CARPAL TUNNEL SYNDROME OF RIGHT WRIST: ICD-10-CM

## 2024-03-14 DIAGNOSIS — D25.9 UTERINE LEIOMYOMA, UNSPECIFIED LOCATION: ICD-10-CM

## 2024-03-14 DIAGNOSIS — R42 VERTIGO: Primary | ICD-10-CM

## 2024-03-14 DIAGNOSIS — R42 DIZZINESS: Primary | ICD-10-CM

## 2024-03-14 LAB
ALBUMIN SERPL-MCNC: 3.6 G/DL (ref 3.5–5)
ALBUMIN/GLOB SERPL: 0.9 (ref 1.1–2.2)
ALP SERPL-CCNC: 78 U/L (ref 45–117)
ALT SERPL-CCNC: 26 U/L (ref 12–78)
ANION GAP SERPL CALC-SCNC: 4 MMOL/L (ref 5–15)
APPEARANCE UR: CLEAR
AST SERPL-CCNC: 17 U/L (ref 15–37)
BACTERIA URNS QL MICRO: NEGATIVE /HPF
BASOPHILS # BLD: 0.1 K/UL (ref 0–0.1)
BASOPHILS NFR BLD: 1 % (ref 0–1)
BILIRUB SERPL-MCNC: 0.5 MG/DL (ref 0.2–1)
BILIRUB UR QL: NEGATIVE
BUN SERPL-MCNC: 17 MG/DL (ref 6–20)
BUN/CREAT SERPL: 21 (ref 12–20)
CALCIUM SERPL-MCNC: 9.3 MG/DL (ref 8.5–10.1)
CHLORIDE SERPL-SCNC: 105 MMOL/L (ref 97–108)
CO2 SERPL-SCNC: 29 MMOL/L (ref 21–32)
COLOR UR: NORMAL
CREAT SERPL-MCNC: 0.82 MG/DL (ref 0.55–1.02)
DIFFERENTIAL METHOD BLD: ABNORMAL
EOSINOPHIL # BLD: 0.2 K/UL (ref 0–0.4)
EOSINOPHIL NFR BLD: 2 % (ref 0–7)
EPITH CASTS URNS QL MICRO: NORMAL /LPF
ERYTHROCYTE [DISTWIDTH] IN BLOOD BY AUTOMATED COUNT: 13.4 % (ref 11.5–14.5)
GLOBULIN SER CALC-MCNC: 4 G/DL (ref 2–4)
GLUCOSE BLD STRIP.AUTO-MCNC: 104 MG/DL (ref 65–117)
GLUCOSE SERPL-MCNC: 108 MG/DL (ref 65–100)
GLUCOSE UR STRIP.AUTO-MCNC: NEGATIVE MG/DL
HCT VFR BLD AUTO: 39.4 % (ref 35–47)
HGB BLD-MCNC: 12.9 G/DL (ref 11.5–16)
HGB UR QL STRIP: NEGATIVE
HYALINE CASTS URNS QL MICRO: NORMAL /LPF (ref 0–2)
IMM GRANULOCYTES # BLD AUTO: 0 K/UL (ref 0–0.04)
IMM GRANULOCYTES NFR BLD AUTO: 0 % (ref 0–0.5)
KETONES UR QL STRIP.AUTO: NEGATIVE MG/DL
LEUKOCYTE ESTERASE UR QL STRIP.AUTO: NEGATIVE
LIPASE SERPL-CCNC: 52 U/L (ref 13–75)
LYMPHOCYTES # BLD: 3.7 K/UL (ref 0.8–3.5)
LYMPHOCYTES NFR BLD: 41 % (ref 12–49)
MCH RBC QN AUTO: 29.7 PG (ref 26–34)
MCHC RBC AUTO-ENTMCNC: 32.7 G/DL (ref 30–36.5)
MCV RBC AUTO: 90.6 FL (ref 80–99)
MONOCYTES # BLD: 0.8 K/UL (ref 0–1)
MONOCYTES NFR BLD: 9 % (ref 5–13)
NEUTS SEG # BLD: 4.3 K/UL (ref 1.8–8)
NEUTS SEG NFR BLD: 47 % (ref 32–75)
NITRITE UR QL STRIP.AUTO: NEGATIVE
NRBC # BLD: 0 K/UL (ref 0–0.01)
NRBC BLD-RTO: 0 PER 100 WBC
PH UR STRIP: 5.5 (ref 5–8)
PLATELET # BLD AUTO: 232 K/UL (ref 150–400)
PMV BLD AUTO: 11.1 FL (ref 8.9–12.9)
POTASSIUM SERPL-SCNC: 3.6 MMOL/L (ref 3.5–5.1)
PROT SERPL-MCNC: 7.6 G/DL (ref 6.4–8.2)
PROT UR STRIP-MCNC: NEGATIVE MG/DL
RBC # BLD AUTO: 4.35 M/UL (ref 3.8–5.2)
RBC #/AREA URNS HPF: NORMAL /HPF (ref 0–5)
SERVICE CMNT-IMP: NORMAL
SODIUM SERPL-SCNC: 138 MMOL/L (ref 136–145)
SP GR UR REFRACTOMETRY: 1.01
TROPONIN I SERPL HS-MCNC: <4 NG/L (ref 0–51)
URINE CULTURE IF INDICATED: NORMAL
UROBILINOGEN UR QL STRIP.AUTO: 0.2 EU/DL (ref 0.2–1)
WBC # BLD AUTO: 9.1 K/UL (ref 3.6–11)
WBC URNS QL MICRO: NORMAL /HPF (ref 0–4)

## 2024-03-14 PROCEDURE — 74177 CT ABD & PELVIS W/CONTRAST: CPT

## 2024-03-14 PROCEDURE — 82962 GLUCOSE BLOOD TEST: CPT

## 2024-03-14 PROCEDURE — 70450 CT HEAD/BRAIN W/O DYE: CPT

## 2024-03-14 PROCEDURE — 85025 COMPLETE CBC W/AUTO DIFF WBC: CPT

## 2024-03-14 PROCEDURE — 83690 ASSAY OF LIPASE: CPT

## 2024-03-14 PROCEDURE — 81001 URINALYSIS AUTO W/SCOPE: CPT

## 2024-03-14 PROCEDURE — 80053 COMPREHEN METABOLIC PANEL: CPT

## 2024-03-14 PROCEDURE — 99285 EMERGENCY DEPT VISIT HI MDM: CPT

## 2024-03-14 PROCEDURE — 36415 COLL VENOUS BLD VENIPUNCTURE: CPT

## 2024-03-14 PROCEDURE — 84484 ASSAY OF TROPONIN QUANT: CPT

## 2024-03-14 PROCEDURE — 6360000004 HC RX CONTRAST MEDICATION: Performed by: PHYSICIAN ASSISTANT

## 2024-03-14 RX ADMIN — IOPAMIDOL 100 ML: 755 INJECTION, SOLUTION INTRAVENOUS at 15:58

## 2024-03-14 ASSESSMENT — ENCOUNTER SYMPTOMS
CHEST TIGHTNESS: 0
SHORTNESS OF BREATH: 0
VOMITING: 0
NAUSEA: 1
ABDOMINAL PAIN: 1
ABDOMINAL DISTENTION: 0
CONSTIPATION: 0
BACK PAIN: 0
RECTAL PAIN: 0
EYE PAIN: 0
DIARRHEA: 0
COUGH: 0
RHINORRHEA: 0
SORE THROAT: 0
BLOOD IN STOOL: 0
WHEEZING: 0
ANAL BLEEDING: 0

## 2024-03-14 ASSESSMENT — PAIN - FUNCTIONAL ASSESSMENT: PAIN_FUNCTIONAL_ASSESSMENT: NONE - DENIES PAIN

## 2024-03-14 ASSESSMENT — VISUAL ACUITY: OU: 1

## 2024-03-14 NOTE — PROGRESS NOTES
ICD-10 E11.620 (Patient not taking: Reported on 9/25/2023)     No current facility-administered medications for this visit.       Objective:     Vitals:    03/14/24 1120   BP: 99/66   Site: Left Upper Arm   Position: Sitting   Cuff Size: Medium Adult   Pulse: 60   Resp: 18   Temp: 98.1 °F (36.7 °C)   TempSrc: Oral   SpO2: 100%   Weight: 69.7 kg (153 lb 9.6 oz)   Height: 1.549 m (5' 1\")       Physical Exam  Vitals reviewed.   Constitutional:       General: She is not in acute distress.     Appearance: Normal appearance. She is not ill-appearing or diaphoretic.   Eyes:      Extraocular Movements: Extraocular movements intact.   Cardiovascular:      Rate and Rhythm: Normal rate and regular rhythm.   Pulmonary:      Effort: Pulmonary effort is normal. No respiratory distress.      Breath sounds: Normal breath sounds. No stridor. No wheezing, rhonchi or rales.   Skin:     Capillary Refill: Capillary refill takes less than 2 seconds.      Coloration: Skin is not pale.      Findings: No rash.   Neurological:      General: No focal deficit present.      Mental Status: She is alert and oriented to person, place, and time.      Cranial Nerves: No cranial nerve deficit.      Gait: Gait abnormal.   Psychiatric:         Mood and Affect: Mood normal.         Behavior: Behavior normal.         Thought Content: Thought content normal.            Assessment/ Plan:     1. Dizziness  2. Balance problem  3. Blurred vision     Referred to ER/ED for immediate evaluation  Given balance issues, blurred vision, dizziness persistent not improving with meclizine, greatest concern today would be for TIA/stroke.      SANDIE Diallo - NP

## 2024-03-14 NOTE — ED PROVIDER NOTES
[facility] EMERGENCY DEPT  EMERGENCY DEPARTMENT ENCOUNTER         Pt Name: Candie Miller  MRN: [id number]  Birthdate 1960  Date of evaluation: 3/14/2024  Provider: Teresa Novak PA-C   PCP: Jasson Correa MD  Note Started: 1:57 PM EDT on 3/14/24     CHIEF COMPLAINT       Chief Complaint   Patient presents with    Abdominal Pain    Hand Pain    Nausea    Dizziness     Pt is ambulatory into ED with c/o vertigo, right hand tightness in the mornings, and intermittent abdominal pain.  Pt states she has an appointment to see her PCP for the abdominal pain but wanted to be seen sooner.          HISTORY OF PRESENT ILLNESS: 1 or more elements      History From: Patient  None     Candie Miller is a 63 y.o. female with medical history significant for vertigo, hypercholesterolemia, diabetes, diverticulosis, GERD, chronic pain, hypertension who presents via self with complaints of acute episode of room spinning dizziness and unsteady gait that started yesterday morning.  Similar symptoms of vertigo in the past.  She did have associated nausea which was different for her.  Mild headache, but states that this is also common with her vertigo.  Denies any thunderclap headache or worst headache of her life.  Denies any new numbness, tingling, focal weakness, redness, rash, wound, syncope, seizure, lightheadedness, chest pain, shortness of breath, wheezing, palpitations.  Patient endorses that she called her PCP and was prescribed meclizine yesterday which she took yesterday as well as this morning with moderate relief of her symptoms.  States that her symptoms are starting to resolve.  She feels most unsteady when she is ambulating.  She also endorses a history of intermittent abdominal pain for months which she has been evaluated by her PCP before and has an upcoming appointment the end of this month.  States that in the past, her lab work and ultrasound were negative, but she is still concerned because she was feeling nauseous

## 2024-03-14 NOTE — ED NOTES
DC papers reviewed and in hand, pt verbalized understanding. Patient ambulatory out of ED with steady gait, no acute distress noted.

## 2024-03-14 NOTE — PATIENT INSTRUCTIONS
Go to one of the following below immediately without delay:       (EMERGENCY DEPARTMENT)  [address]   [phone]   Open 24 hours    [facility]   (EMERGENCY DEPARTMENT)  [address]   [phone]   Open 24 hours    [facility]  (EMERGENCY DEPARTMENT)  [address] [place] [address] [phone]

## 2024-03-15 LAB
EKG ATRIAL RATE: 61 BPM
EKG DIAGNOSIS: NORMAL
EKG P AXIS: 59 DEGREES
EKG P-R INTERVAL: 138 MS
EKG Q-T INTERVAL: 416 MS
EKG QRS DURATION: 76 MS
EKG QTC CALCULATION (BAZETT): 418 MS
EKG R AXIS: 15 DEGREES
EKG T AXIS: 29 DEGREES
EKG VENTRICULAR RATE: 61 BPM

## 2024-03-21 ENCOUNTER — OFFICE VISIT (OUTPATIENT)
Age: 64
End: 2024-03-21
Payer: COMMERCIAL

## 2024-03-21 VITALS
WEIGHT: 160.6 LBS | DIASTOLIC BLOOD PRESSURE: 64 MMHG | SYSTOLIC BLOOD PRESSURE: 100 MMHG | BODY MASS INDEX: 30.32 KG/M2 | OXYGEN SATURATION: 99 % | HEART RATE: 90 BPM | TEMPERATURE: 96.9 F | RESPIRATION RATE: 16 BRPM | HEIGHT: 61 IN

## 2024-03-21 DIAGNOSIS — K58.1 IRRITABLE BOWEL SYNDROME WITH CONSTIPATION: ICD-10-CM

## 2024-03-21 DIAGNOSIS — E11.9 TYPE 2 DIABETES MELLITUS WITHOUT COMPLICATION, WITHOUT LONG-TERM CURRENT USE OF INSULIN (HCC): ICD-10-CM

## 2024-03-21 DIAGNOSIS — D25.9 UTERINE LEIOMYOMA, UNSPECIFIED LOCATION: Primary | ICD-10-CM

## 2024-03-21 DIAGNOSIS — R10.9 ABDOMINAL PAIN, UNSPECIFIED ABDOMINAL LOCATION: ICD-10-CM

## 2024-03-21 PROCEDURE — 3074F SYST BP LT 130 MM HG: CPT | Performed by: INTERNAL MEDICINE

## 2024-03-21 PROCEDURE — 3078F DIAST BP <80 MM HG: CPT | Performed by: INTERNAL MEDICINE

## 2024-03-21 PROCEDURE — 99214 OFFICE O/P EST MOD 30 MIN: CPT | Performed by: INTERNAL MEDICINE

## 2024-03-21 PROCEDURE — 3044F HG A1C LEVEL LT 7.0%: CPT | Performed by: INTERNAL MEDICINE

## 2024-03-21 RX ORDER — DICYCLOMINE HYDROCHLORIDE 10 MG/1
10 CAPSULE ORAL
Qty: 40 CAPSULE | Refills: 0 | Status: SHIPPED | OUTPATIENT
Start: 2024-03-21

## 2024-03-21 RX ORDER — UBIDECARENONE 75 MG
50 CAPSULE ORAL DAILY
COMMUNITY

## 2024-03-21 NOTE — PROGRESS NOTES
1. \"Have you been to the ER, urgent care clinic since your last visit?  Hospitalized since your last visit?\"       UC and ER visit for vertigo// abdominal pain     2. \"Have you seen or consulted any other health care providers outside of the [facility] since your last visit?\" No     3. For patients aged 45-75: Has the patient had a colonoscopy / FIT/ Cologuard? 2020 kimberly      If the patient is female:    4. For patients aged 40-74: Has the patient had a mammogram within the past 2 years?  2/2024      5. For patients aged 21-65: Has the patient had a pap smear?  No    
09/25/2023 01:11 PM    CHOLHDLRATIO 2.5 09/25/2023 01:11 PM     Hemoglobin A1C:   Hemoglobin A1C   Date Value Ref Range Status   02/12/2024 6.5 (H) 4.0 - 5.6 % Final     Comment:     (NOTE)  HbA1C Interpretive Ranges  <5.7              Normal  5.7 - 6.4         Consider Prediabetes  >6.5              Consider Diabetes          Review of Systems     Physical Exam  Constitutional:       Appearance: Normal appearance.   HENT:      Head: Normocephalic.      Right Ear: Tympanic membrane normal.      Left Ear: Tympanic membrane normal.      Nose: Nose normal.   Cardiovascular:      Rate and Rhythm: Normal rate and regular rhythm.      Pulses: Normal pulses.   Pulmonary:      Effort: Pulmonary effort is normal.   Abdominal:      General: Abdomen is flat. Bowel sounds are normal.   Musculoskeletal:         General: Normal range of motion.      Cervical back: Normal range of motion.      Right lower leg: No edema.   Neurological:      General: No focal deficit present.      Mental Status: She is alert.   Psychiatric:         Mood and Affect: Mood normal.         Behavior: Behavior normal.         Thought Content: Thought content normal.         Judgment: Judgment normal.          ASSESSMENT and PLAN  There are no diagnoses linked to this encounter.   Candie was seen today for follow-up.    Diagnoses and all orders for this visit:    Uterine leiomyoma, unspecified location  -     AFL - Rosalina Tsang MD, Ob-Gyn, [place]   Due for PAP  Abdominal pain, unspecified abdominal location  -     HEMA - Jeff Quiles MD, Gastroenterology, [place]   Probable IBS--try bentyl prn first    See GI if not improving   Increase fibre in diet or take metamucil daily  Irritable bowel syndrome with constipation  -     dicyclomine (BENTYL) 10 MG capsule; Take 1 capsule by mouth 4 times daily (before meals and nightly)    Type 2 diabetes mellitus without complication, without long-term current use of insulin (HCC)   Controlled on

## 2024-04-08 ENCOUNTER — TELEPHONE (OUTPATIENT)
Age: 64
End: 2024-04-08

## 2024-04-08 RX ORDER — AMOXICILLIN AND CLAVULANATE POTASSIUM 500; 125 MG/1; MG/1
4 TABLET, FILM COATED ORAL ONCE
Qty: 4 TABLET | Refills: 2 | Status: SHIPPED | OUTPATIENT
Start: 2024-04-08 | End: 2024-04-08

## 2024-04-08 NOTE — TELEPHONE ENCOUNTER
Patient is requesting an antibiotic for her dental appt today at 2:45 pm. She would like the prescription to be sent to her preferred pharmacy of [id number] [place] [facility] - P [phone] - F [phone] [[id number]]

## 2024-05-29 DIAGNOSIS — M17.12 OSTEOARTHRITIS OF LEFT KNEE, UNSPECIFIED OSTEOARTHRITIS TYPE: ICD-10-CM

## 2024-05-29 DIAGNOSIS — E11.9 TYPE 2 DIABETES MELLITUS WITHOUT COMPLICATION, WITHOUT LONG-TERM CURRENT USE OF INSULIN (HCC): Primary | ICD-10-CM

## 2024-05-29 NOTE — TELEPHONE ENCOUNTER
----- Message from Jonna Connell sent at 5/29/2024  1:27 PM EDT -----  Subject: Refill Request    QUESTIONS  Name of Medication? Other - Ibuprofen   Patient-reported dosage and instructions? 600 MG tablet once per day as   needed  How many days do you have left? 0  Preferred Pharmacy? [facility]AC[facility] #[id number]  Pharmacy phone number (if available)? [phone]  ---------------------------------------------------------------------------  --------------,  Name of Medication? Lancets MISC  Patient-reported dosage and instructions? 2 times par day   How many days do you have left? 0  Preferred Pharmacy? [facility]/PHARMACY #[id number]  Pharmacy phone number (if available)? [phone]  Additional Information for Provider? Patient stated she has the ultra 2   machine and needs the strips for that.   ---------------------------------------------------------------------------  --------------  CALL BACK INFO  What is the best way for the office to contact you? OK to leave message on   voicemail  Preferred Call Back Phone Number? [phone]  ---------------------------------------------------------------------------  --------------  SCRIPT ANSWERS  Relationship to Patient? Self

## 2024-05-29 NOTE — TELEPHONE ENCOUNTER
PCP: Jasson Correa MD    Last appt:   3/21/2024    No future appointments.    Requested Prescriptions     Pending Prescriptions Disp Refills    OneTouch UltraSoft 2 Lancets MISC 100 each 11    ibuprofen (ADVIL;MOTRIN) 600 MG tablet 30 tablet 0     Sig: Take 1 tablet by mouth 3 times daily as needed for Pain

## 2024-05-30 RX ORDER — IBUPROFEN 600 MG/1
600 TABLET ORAL 3 TIMES DAILY PRN
Qty: 30 TABLET | Refills: 0 | Status: SHIPPED | OUTPATIENT
Start: 2024-05-30

## 2024-05-30 RX ORDER — LANCETS 30 GAUGE
EACH MISCELLANEOUS
Qty: 100 EACH | Refills: 11 | Status: SHIPPED | OUTPATIENT
Start: 2024-05-30

## 2024-06-03 ENCOUNTER — TELEPHONE (OUTPATIENT)
Age: 64
End: 2024-06-03

## 2024-06-03 RX ORDER — LANCETS 30 GAUGE
1 EACH MISCELLANEOUS DAILY
Qty: 100 EACH | Refills: 5 | Status: SHIPPED | OUTPATIENT
Start: 2024-06-03

## 2024-06-03 RX ORDER — GLUCOSAMINE HCL/CHONDROITIN SU 500-400 MG
CAPSULE ORAL
Qty: 100 STRIP | Refills: 5 | Status: SHIPPED | OUTPATIENT
Start: 2024-06-03

## 2024-06-03 NOTE — TELEPHONE ENCOUNTER
(STATES LANCETS RECENTLY SENT WERE THE WRONG KIND, SHOULD BE ONE TOUCH ULTRA TOUCH 2)      Caller requests Refill of:  One touch ultra Touch 2 lancets  One touch ultra TOUCH 2 STRIPS      Please send to:    [id number] - [place] - [address] - P [phone] - F [phone]  [address] [facility] [address]  Phone: [phone] Fax: [phone]         Visit / Appointment History:  Future Appointment at [facility]:  7/8/2024   Last Appointment With PCP:  3/21/2024

## 2024-06-03 NOTE — TELEPHONE ENCOUNTER
PCP: Jasson Correa MD    Last appt: 3/21/2024   Future Appointments   Date Time Provider Department Center   2024  8:45 AM Jasson Correa MD [facility] BS [facility]       Requested Prescriptions     Pending Prescriptions Disp Refills    Lancets MISC 100 each 5     Si each by Does not apply route daily ONE TOUCH ULTRA TOUCH 2    blood glucose monitor strips 100 strip 5     Sig: ONE TOUCH ULTRA TOUCH 2  Test 1 times a day & as needed for symptoms of irregular blood glucose. Dispense sufficient amount for indicated testing frequency plus additional to accommodate PRN testing needs.

## 2024-06-03 NOTE — TELEPHONE ENCOUNTER
Caller states:    Swelling in legs,   Pharmacy recommends compression socks but pt needs to know what dr recommends    Is this a new problem:    yes -   No available appts with pcp for this psr to schedule in requested time frame  Pt does have 6 mo fu in aug.    Can she be moved up for a sooner appt to have legs checked?  OR advise if ok to buy compression socks    Date of last appointment:    3/21/2024     Please call patient back to advise.   DETAILED VM OK PER PT                Caller confirms readback of documented phone/fax number(s) as correct.    Caller advised that there can be a 24-48 business hour turn around time on callbacks.    Caller advised that if pt deems they cannot wait any longer or symptoms worsen, ED or UC would be another option to consider for immediate treatment.

## 2024-06-04 NOTE — TELEPHONE ENCOUNTER
Spoke with patient. Advised. Verbalized understanding.     Per Dr. Correa  Telpt to elevatd and just get otc moderate compression hose-knee high

## 2024-06-04 NOTE — TELEPHONE ENCOUNTER
Attempted to reach patient. Left detailed message on vm. Advised to call office back if have questions.   Closing encounter    Per Dr. Correa  Telpt to elevatd and just get otc moderate compression hose-knee high

## 2024-06-28 ENCOUNTER — TELEPHONE (OUTPATIENT)
Age: 64
End: 2024-06-28

## 2024-06-28 NOTE — TELEPHONE ENCOUNTER
Left message for patient to call back to discuss reason for appt on 7/8/24.   Last instructions were to return in about 6 months (around 9/21/2024) for CPE.

## 2024-07-01 DIAGNOSIS — M17.12 OSTEOARTHRITIS OF LEFT KNEE, UNSPECIFIED OSTEOARTHRITIS TYPE: ICD-10-CM

## 2024-07-01 RX ORDER — IBUPROFEN 600 MG/1
600 TABLET ORAL 3 TIMES DAILY PRN
Qty: 30 TABLET | Refills: 0 | Status: SHIPPED | OUTPATIENT
Start: 2024-07-01

## 2024-07-01 NOTE — TELEPHONE ENCOUNTER
PCP: Jasson Correa MD    Last appt: 3/21/2024   Future Appointments   Date Time Provider Department Center   7/15/2024  3:15 PM Jasson Correa MD [facility]   9/23/2024  3:15 PM Jasson Correa MD [facility] BS AMB       Requested Prescriptions     Pending Prescriptions Disp Refills    ibuprofen (ADVIL;MOTRIN) 600 MG tablet 30 tablet 0     Sig: Take 1 tablet by mouth 3 times daily as needed for Pain

## 2024-07-01 NOTE — TELEPHONE ENCOUNTER
ibuprofen (ADVIL;MOTRIN) 600 MG tablet    Refill to [facility] #[id number]  [address]

## 2024-07-08 RX ORDER — LOSARTAN POTASSIUM 100 MG/1
100 TABLET ORAL DAILY
Qty: 90 TABLET | Refills: 2 | Status: SHIPPED | OUTPATIENT
Start: 2024-07-08

## 2024-07-15 ENCOUNTER — OFFICE VISIT (OUTPATIENT)
Age: 64
End: 2024-07-15
Payer: COMMERCIAL

## 2024-07-15 VITALS
TEMPERATURE: 97.1 F | OXYGEN SATURATION: 98 % | WEIGHT: 164.6 LBS | HEART RATE: 67 BPM | RESPIRATION RATE: 12 BRPM | SYSTOLIC BLOOD PRESSURE: 120 MMHG | BODY MASS INDEX: 31.08 KG/M2 | HEIGHT: 61 IN | DIASTOLIC BLOOD PRESSURE: 70 MMHG

## 2024-07-15 DIAGNOSIS — R60.0 BILATERAL LEG EDEMA: Primary | ICD-10-CM

## 2024-07-15 PROCEDURE — 3074F SYST BP LT 130 MM HG: CPT | Performed by: INTERNAL MEDICINE

## 2024-07-15 PROCEDURE — 99214 OFFICE O/P EST MOD 30 MIN: CPT | Performed by: INTERNAL MEDICINE

## 2024-07-15 PROCEDURE — 3078F DIAST BP <80 MM HG: CPT | Performed by: INTERNAL MEDICINE

## 2024-07-15 RX ORDER — FUROSEMIDE 20 MG/1
20 TABLET ORAL DAILY PRN
Qty: 30 TABLET | Refills: 1 | Status: SHIPPED | OUTPATIENT
Start: 2024-07-15

## 2024-07-15 RX ORDER — BUTALBITAL, ACETAMINOPHEN AND CAFFEINE 50; 325; 40 MG/1; MG/1; MG/1
TABLET ORAL
Qty: 30 TABLET | Refills: 0 | Status: SHIPPED | OUTPATIENT
Start: 2024-07-15

## 2024-07-15 RX ORDER — MECLIZINE HYDROCHLORIDE 25 MG/1
TABLET ORAL
Qty: 30 TABLET | Refills: 1 | Status: SHIPPED | OUTPATIENT
Start: 2024-07-15

## 2024-07-15 NOTE — PROGRESS NOTES
Chief Complaint   Patient presents with    Bilateral ankle swelling        \"Have you been to the ER, urgent care clinic since your last visit?  Hospitalized since your last visit?\"    NO    “Have you seen or consulted any other health care providers outside of [facility] since your last visit?”    NO     “Have you had a pap smear?”    NO    No cervical cancer screening on file             Click Here for Release of Records Request     
tablet TAKE 1 TABLET BY MOUTH THREE (3) TIMES DAILY AS NEEDED FOR DIZZINESS FOR UP TO 10 DAYS. 30 tablet 1    omeprazole (PRILOSEC) 20 MG delayed release capsule Take 1 capsule by mouth every morning (before breakfast) 30 capsule 1    butalbital-acetaminophen-caffeine (FIORICET, ESGIC) -40 MG per tablet One tab q6 hours prn headache 30 tablet 0    vitamin D-3 (CHOLECALCIFEROL) 125 MCG (5000 UT) TABS Take 1,000 Units by mouth daily      hydroCHLOROthiazide (HYDRODIURIL) 25 MG tablet TAKE 1 TABLET BY MOUTH EVERY DAY 90 tablet 3    rosuvastatin (CRESTOR) 20 MG tablet TAKE 1 TABLET BY MOUTH EVERY DAY 90 tablet 3    metFORMIN (GLUCOPHAGE) 1000 MG tablet TAKE 1 TABLET BY MOUTH EVERY DAY 90 tablet 3    Lancets MISC Check fsbs every day 250.02 (Patient not taking: Reported on 9/25/2023)      Lancets MISC One Touch Ultra 2 meter. Check blood sugars daily. ICD-10 E11.620 (Patient not taking: Reported on 9/25/2023)      aspirin 81 MG EC tablet Take by mouth daily      brimonidine (ALPHAGAN) 0.2 % ophthalmic solution INSTILL 1 DROP IN EACH EYE TWICE A DAY      hydrocortisone 2.5 % cream Apply topically 2 times daily      timolol (TIMOPTIC) 0.5 % ophthalmic solution Apply 1 drop to eye daily       No current facility-administered medications for this visit.     No Known Allergies     BMP:   Lab Results   Component Value Date/Time     03/14/2024 01:50 PM    K 3.6 03/14/2024 01:50 PM     03/14/2024 01:50 PM    CO2 29 03/14/2024 01:50 PM    BUN 17 03/14/2024 01:50 PM    CREATININE 0.82 03/14/2024 01:50 PM    GLUCOSE 108 03/14/2024 01:50 PM    CALCIUM 9.3 03/14/2024 01:50 PM      CBC:   Lab Results   Component Value Date/Time    WBC 9.1 03/14/2024 01:50 PM    RBC 4.35 03/14/2024 01:50 PM    HGB 12.9 03/14/2024 01:50 PM    HCT 39.4 03/14/2024 01:50 PM    MCV 90.6 03/14/2024 01:50 PM    MCH 29.7 03/14/2024 01:50 PM    MCHC 32.7 03/14/2024 01:50 PM    RDW 13.4 03/14/2024 01:50 PM     03/14/2024 01:50 PM    MPV

## 2024-07-16 LAB
ALBUMIN SERPL-MCNC: 3.9 G/DL (ref 3.5–5)
ALBUMIN/GLOB SERPL: 1.2 (ref 1.1–2.2)
ALP SERPL-CCNC: 98 U/L (ref 45–117)
ALT SERPL-CCNC: 35 U/L (ref 12–78)
ANION GAP SERPL CALC-SCNC: 9 MMOL/L (ref 5–15)
AST SERPL-CCNC: 15 U/L (ref 15–37)
BILIRUB SERPL-MCNC: 0.4 MG/DL (ref 0.2–1)
BUN SERPL-MCNC: 28 MG/DL (ref 6–20)
BUN/CREAT SERPL: 32 (ref 12–20)
CALCIUM SERPL-MCNC: 9.6 MG/DL (ref 8.5–10.1)
CHLORIDE SERPL-SCNC: 108 MMOL/L (ref 97–108)
CO2 SERPL-SCNC: 26 MMOL/L (ref 21–32)
CREAT SERPL-MCNC: 0.87 MG/DL (ref 0.55–1.02)
GLOBULIN SER CALC-MCNC: 3.3 G/DL (ref 2–4)
GLUCOSE SERPL-MCNC: 96 MG/DL (ref 65–100)
POTASSIUM SERPL-SCNC: 4.2 MMOL/L (ref 3.5–5.1)
PROT SERPL-MCNC: 7.2 G/DL (ref 6.4–8.2)
SODIUM SERPL-SCNC: 143 MMOL/L (ref 136–145)

## 2024-08-14 RX ORDER — ROSUVASTATIN CALCIUM 20 MG/1
TABLET, COATED ORAL
Qty: 90 TABLET | Refills: 3 | Status: SHIPPED | OUTPATIENT
Start: 2024-08-14

## 2024-08-20 RX ORDER — FUROSEMIDE 20 MG/1
20 TABLET ORAL DAILY PRN
Qty: 30 TABLET | Refills: 1 | Status: SHIPPED | OUTPATIENT
Start: 2024-08-20

## 2024-09-03 RX ORDER — HYDROCHLOROTHIAZIDE 25 MG/1
TABLET ORAL
Qty: 90 TABLET | Refills: 3 | Status: SHIPPED | OUTPATIENT
Start: 2024-09-03

## 2024-09-23 ENCOUNTER — OFFICE VISIT (OUTPATIENT)
Age: 64
End: 2024-09-23
Payer: COMMERCIAL

## 2024-09-23 VITALS
WEIGHT: 161.8 LBS | DIASTOLIC BLOOD PRESSURE: 76 MMHG | BODY MASS INDEX: 30.55 KG/M2 | SYSTOLIC BLOOD PRESSURE: 110 MMHG | HEART RATE: 64 BPM | OXYGEN SATURATION: 99 % | RESPIRATION RATE: 16 BRPM | TEMPERATURE: 97.2 F | HEIGHT: 61 IN

## 2024-09-23 DIAGNOSIS — R60.0 BILATERAL LEG EDEMA: ICD-10-CM

## 2024-09-23 DIAGNOSIS — E11.9 TYPE 2 DIABETES MELLITUS WITHOUT COMPLICATION, WITHOUT LONG-TERM CURRENT USE OF INSULIN (HCC): ICD-10-CM

## 2024-09-23 DIAGNOSIS — E78.5 HYPERLIPIDEMIA, UNSPECIFIED HYPERLIPIDEMIA TYPE: ICD-10-CM

## 2024-09-23 DIAGNOSIS — I10 HYPERTENSION, UNSPECIFIED TYPE: Primary | ICD-10-CM

## 2024-09-23 PROCEDURE — 3078F DIAST BP <80 MM HG: CPT | Performed by: INTERNAL MEDICINE

## 2024-09-23 PROCEDURE — 99214 OFFICE O/P EST MOD 30 MIN: CPT | Performed by: INTERNAL MEDICINE

## 2024-09-23 PROCEDURE — 3044F HG A1C LEVEL LT 7.0%: CPT | Performed by: INTERNAL MEDICINE

## 2024-09-23 PROCEDURE — 3074F SYST BP LT 130 MM HG: CPT | Performed by: INTERNAL MEDICINE

## 2024-09-23 RX ORDER — GLUCOSAMINE HCL/CHONDROITIN SU 500-400 MG
CAPSULE ORAL
Qty: 50 STRIP | Refills: 5 | Status: SHIPPED | OUTPATIENT
Start: 2024-09-23

## 2024-09-24 ENCOUNTER — HOSPITAL ENCOUNTER (OUTPATIENT)
Facility: HOSPITAL | Age: 64
Discharge: HOME | End: 2024-09-26
Attending: INTERNAL MEDICINE
Payer: COMMERCIAL

## 2024-09-24 DIAGNOSIS — R60.0 BILATERAL LEG EDEMA: ICD-10-CM

## 2024-09-24 LAB
CHOLEST SERPL-MCNC: 157 MG/DL
CREAT UR-MCNC: 39.9 MG/DL
EST. AVERAGE GLUCOSE BLD GHB EST-MCNC: 148 MG/DL
HBA1C MFR BLD: 6.8 % (ref 4–5.6)
HDLC SERPL-MCNC: 60 MG/DL
HDLC SERPL: 2.6 (ref 0–5)
LDLC SERPL CALC-MCNC: 84 MG/DL (ref 0–100)
MICROALBUMIN UR-MCNC: <0.5 MG/DL
MICROALBUMIN/CREAT UR-RTO: <13 MG/G (ref 0–30)
SPECIMEN HOLD: NORMAL
TRIGL SERPL-MCNC: 65 MG/DL
VLDLC SERPL CALC-MCNC: 13 MG/DL

## 2024-09-24 PROCEDURE — 93306 TTE W/DOPPLER COMPLETE: CPT

## 2024-09-25 LAB
ECHO AO ROOT DIAM: 2.5 CM
ECHO AV AREA PLAN: 2 CM2
ECHO EST RA PRESSURE: 5 MMHG
ECHO LA DIAMETER: 4.5 CM
ECHO LA TO AORTIC ROOT RATIO: 1.8
ECHO LA VOL A-L A4C: 63 ML (ref 22–52)
ECHO LA VOL MOD A4C: 59 ML (ref 22–52)
ECHO LV EDV A4C: 102 ML
ECHO LV EF PHYSICIAN: 55 %
ECHO LV EJECTION FRACTION A4C: 68 %
ECHO LV ESV A4C: 33 ML
ECHO LV FRACTIONAL SHORTENING: 17 % (ref 28–44)
ECHO LV INTERNAL DIMENSION DIASTOLIC: 4.8 CM (ref 3.9–5.3)
ECHO LV INTERNAL DIMENSION SYSTOLIC: 4 CM
ECHO LV IVSD: 0.8 CM (ref 0.6–0.9)
ECHO LV MASS 2D: 126.7 G (ref 67–162)
ECHO LV POSTERIOR WALL DIASTOLIC: 0.8 CM (ref 0.6–0.9)
ECHO LV RELATIVE WALL THICKNESS RATIO: 0.33
ECHO LVOT AREA: 2.3 CM2
ECHO LVOT DIAM: 1.7 CM
ECHO LVOT PEAK GRADIENT: 4 MMHG
ECHO LVOT PEAK VELOCITY: 1 M/S
ECHO MV A VELOCITY: 0.66 M/S
ECHO MV AREA PHT: 4.1 CM2
ECHO MV E DECELERATION TIME (DT): 106.1 MS
ECHO MV E VELOCITY: 0.69 M/S
ECHO MV E/A RATIO: 1.05
ECHO MV MAX VELOCITY: 0.9 M/S
ECHO MV MEAN GRADIENT: 1 MMHG
ECHO MV MEAN VELOCITY: 0.6 M/S
ECHO MV PEAK GRADIENT: 3 MMHG
ECHO MV PRESSURE HALF TIME (PHT): 54 MS
ECHO MV VTI: 24 CM
ECHO PULMONARY ARTERY END DIASTOLIC PRESSURE: 26 MMHG
ECHO PV MAX VELOCITY: 0.8 M/S
ECHO PV PEAK GRADIENT: 2 MMHG
ECHO RIGHT VENTRICULAR SYSTOLIC PRESSURE (RVSP): 33 MMHG
ECHO TV REGURGITANT MAX VELOCITY: 2.64 M/S
ECHO TV REGURGITANT PEAK GRADIENT: 29 MMHG

## 2024-11-04 RX ORDER — AMOXICILLIN AND CLAVULANATE POTASSIUM 500; 125 MG/1; MG/1
4 TABLET, FILM COATED ORAL ONCE
Qty: 4 TABLET | Refills: 2 | Status: SHIPPED | OUTPATIENT
Start: 2024-11-04 | End: 2024-11-04

## 2024-11-11 ENCOUNTER — TELEMEDICINE (OUTPATIENT)
Age: 64
End: 2024-11-11
Payer: COMMERCIAL

## 2024-11-11 ENCOUNTER — TELEPHONE (OUTPATIENT)
Age: 64
End: 2024-11-11

## 2024-11-11 DIAGNOSIS — M17.12 OSTEOARTHRITIS OF LEFT KNEE, UNSPECIFIED OSTEOARTHRITIS TYPE: ICD-10-CM

## 2024-11-11 DIAGNOSIS — R42 DIZZINESS: Primary | ICD-10-CM

## 2024-11-11 PROCEDURE — 99213 OFFICE O/P EST LOW 20 MIN: CPT | Performed by: INTERNAL MEDICINE

## 2024-11-11 RX ORDER — IBUPROFEN 600 MG/1
600 TABLET, FILM COATED ORAL 3 TIMES DAILY PRN
Qty: 30 TABLET | Refills: 0 | Status: SHIPPED | OUTPATIENT
Start: 2024-11-11

## 2024-11-11 RX ORDER — MECLIZINE HYDROCHLORIDE 25 MG/1
25 TABLET ORAL 3 TIMES DAILY PRN
Qty: 30 TABLET | Refills: 0 | Status: SHIPPED | OUTPATIENT
Start: 2024-11-11 | End: 2024-11-21

## 2024-11-11 NOTE — TELEPHONE ENCOUNTER
Pt called in stating would like Dr Correa to know her readings from the BP check she did at drug store    1st - BP- 108 Diagnostic- 58  Pulse - 83  2nd -  BP- 126 Diagnostic- 76 Pulse -88   3rd - BP - 96 Diagnostic - 60 Pulse-86    Please call to advise if needed     When she went back and did 4th reading pt stated BP was low     Pt would like to know what to eat and drink to bring up the BP

## 2024-11-11 NOTE — PROGRESS NOTES
HISTORY OF PRESENT ILLNESS   Candie Miller   is a 64 y.o.  female.    Candie Miller, was evaluated through a synchronous (real-time) audio-video encounter. The patient (or guardian if applicable) is aware that this is a billable service, which includes applicable co-pays. This Virtual Visit was conducted with patient's (and/or legal guardian's) consent. Patient identification was verified, and a caregiver was present when appropriate.   The patient was located at Home: [address] [place] [address] [address]  [facility] [id number]  Provider was located at Facility (Appt Dept): [address] [facility]  [address]  [place] [address]  Confirm you are appropriately licensed, registered, or certified to deliver care in the state where the patient is located as indicated above. If you are not or unsure, please re-schedule the visit: Yes, I confirm.        Total time spent for this encounter: Not billed by time    --Jasson Correa MD on 11/11/2024 at 9:30 AM    An electronic signature was used to authenticate this note.  hx dm-2 htn hld tension headaches     Dealing with sone death in the family  Some leg tightness and neck and pain in arms prior to getting dizzy for several days interniutte bt    Dizzy yesterday but slightly better today  Feels slightly off balance  Has not taken meclizine yet  No home BP  More dizzy when she stands up and moves  No cp or sob    Last OV  Last A1c 6.5 LDL 77  Fsbs on metformin-none recent  Takes lasix prn edema--takes med infrequently . Wears support hose at work  Will get echo tomorrow but prefers to cancel  No cp sob weight gain etc  Patient Active Problem List    Diagnosis Date Noted    Osteoarthritis of left knee, unspecified osteoarthritis type 04/11/2023    Unilateral primary osteoarthritis, right hip 04/10/2023    Peripheral neuropathy 01/30/2012    HTN (hypertension) 06/19/2009    Osteoarthritis 06/19/2009    Diabetic gastroparesis (HCC) 06/19/2009    Hypercholesterolemia 06/19/2009

## 2024-11-11 NOTE — PROGRESS NOTES
\"Have you been to the ER, urgent care clinic since your last visit?  Hospitalized since your last visit?\"    NO    “Have you seen or consulted any other health care providers outside our system since your last visit?”    NO

## 2024-11-12 NOTE — TELEPHONE ENCOUNTER
Attempted to reach patient. Left message on vm to return call    Per Dr. Correa  Tell pt to hydrate with any fluid. If she has continue dizziness and low bp them can stop the HCTZ

## 2024-11-13 NOTE — TELEPHONE ENCOUNTER
Attempted to reach patient. Left message on vm to return call x2  Left detailed message on vm. Vm identifies patient first/last name.     Per Dr. Correa  Tell pt to hydrate with any fluid. If she has continue dizziness and low bp them can stop the HCTZ

## 2025-01-02 ENCOUNTER — TELEPHONE (OUTPATIENT)
Age: 65
End: 2025-01-02

## 2025-01-02 RX ORDER — BENZONATATE 100 MG/1
100 CAPSULE ORAL 3 TIMES DAILY PRN
Qty: 30 CAPSULE | Refills: 0 | Status: SHIPPED | OUTPATIENT
Start: 2025-01-02 | End: 2025-01-12

## 2025-01-02 RX ORDER — BUTALBITAL, ACETAMINOPHEN AND CAFFEINE 50; 325; 40 MG/1; MG/1; MG/1
TABLET ORAL
Qty: 30 TABLET | Refills: 0 | Status: SHIPPED | OUTPATIENT
Start: 2025-01-02

## 2025-01-02 NOTE — TELEPHONE ENCOUNTER
Caller requests Refill of:  butalbital-acetaminophen-caffeine (FIORICET, ESGIC) -40 MG per tablet       Please send to:    [facility]/pharmacy #[id number] - [place] - [address] - [address] [phone] - F [phone]  [address] [facility] [address]  Phone: [phone] Fax: [phone]         Visit / Appointment History:  Future Appointment at [facility]:  4/9/2025   Last Appointment With PCP:  11/11/2024       Caller confirmed instructions and dosages as correct.    Caller was advised that Meds will be refilled as soon as possible, however there can be a 48-72 business hour turn around on refill requests.

## 2025-01-02 NOTE — TELEPHONE ENCOUNTER
Spoke with patient. C/o cold symptoms and vomiting.   Pt went out to eat yesterday and last night started coughing, sore throat, and vomiting the food she ate at restaurant.   States vomiting has stopped and headache subsided. States she is feeling a little better.     Dry cough and Sore throat. Has not tried any otc meds due to being diabetic.   No flu/covid test.    Please advise.

## 2025-01-02 NOTE — TELEPHONE ENCOUNTER
PCP: Jasson Correa MD    Last appt: 11/11/2024   Future Appointments   Date Time Provider Department Center   4/9/2025 10:30 AM Jasson Correa MD [facility] DEP       Requested Prescriptions     Pending Prescriptions Disp Refills    butalbital-acetaminophen-caffeine (FIORICET, ESGIC) -40 MG per tablet 30 tablet 0     Sig: One tab q6 hours prn headache

## 2025-01-02 NOTE — TELEPHONE ENCOUNTER
Pt states not feeling well. Pt states experiencing vomiting yesterday 01/01 and still experiencing sore throat, cough, headache. Pt states have not taken covid or flu test. Please call to schedule.

## 2025-01-10 RX ORDER — BUTALBITAL, ACETAMINOPHEN AND CAFFEINE 50; 325; 40 MG/1; MG/1; MG/1
TABLET ORAL
Qty: 30 TABLET | Refills: 0 | Status: SHIPPED | OUTPATIENT
Start: 2025-01-10

## 2025-01-10 NOTE — TELEPHONE ENCOUNTER
Received faxed refill request from pharmacy      PCP: Jasson Correa MD    Last appt: 11/11/2024  Future Appointments   Date Time Provider Department Center   4/9/2025 10:30 AM Jasson Correa MD [facility] DEP       Requested Prescriptions     Pending Prescriptions Disp Refills    butalbital-acetaminophen-caffeine (FIORICET, ESGIC) -40 MG per tablet 30 tablet 0     Sig: One tab q6 hours prn headache

## 2025-01-28 ENCOUNTER — TELEPHONE (OUTPATIENT)
Age: 65
End: 2025-01-28

## 2025-01-28 NOTE — TELEPHONE ENCOUNTER
No available acute appointments with any provider tomorrow. Per Dr. Correa, patient was advised to go to Urgent Care. Patient understood and also scheduled for CPE for March.

## 2025-01-28 NOTE — TELEPHONE ENCOUNTER
Triage: dizziness, weak, stomach virus    Went to patient 1st 2 weeks ago for COVID    Symptom management: soups, fluids,     Vomiting has subsided    Main concern is weakness and dizziness     She has not checked BG and BP   States she will go to [facility] to check BP     Prefers in office appt - hopefully tomorrow as she off of work

## 2025-03-22 NOTE — PROGRESS NOTES
HISTORY OF PRESENT ILLNESS   Candie Miller   is a 64 y.o.  female.  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    with children  Nonsmoker no etoh  Last OV     Dealing with sone death in the family  Some leg tightness and neck and pain in arms prior to getting dizzy for several days intermittern     Dizzy yesterday but slightly better today  Feels slightly off balance  Has not taken meclizine yet  No home BP  More dizzy when she stands up and moves  No cp or sob     Patient Active Problem List    Diagnosis Date Noted    Osteoarthritis of left knee, unspecified osteoarthritis type 04/11/2023    Unilateral primary osteoarthritis, right hip 04/10/2023    Peripheral neuropathy 01/30/2012    HTN (hypertension) 06/19/2009    Osteoarthritis 06/19/2009    Diabetic gastroparesis (HCC) 06/19/2009    Hypercholesterolemia 06/19/2009    Diabetes mellitus (HCC) 06/19/2009    Bronchitis 06/19/2009     Current Outpatient Medications   Medication Sig Dispense Refill    butalbital-acetaminophen-caffeine (FIORICET, ESGIC) -40 MG per tablet One tab q6 hours prn headache 30 tablet 0    ibuprofen (ADVIL;MOTRIN) 600 MG tablet Take 1 tablet by mouth 3 times daily as needed for Pain 30 tablet 0    blood glucose monitor strips ONE TOUCH ULTRA TOUCH 2 Test 1 times a day  250.02 E11.9 50 strip 5    hydroCHLOROthiazide (HYDRODIURIL) 25 MG tablet TAKE 1 TABLET BY MOUTH EVERY DAY 90 tablet 3    furosemide (LASIX) 20 MG tablet TAKE 1 TABLET BY MOUTH DAILY AS NEEDED (EDEMA). 30 tablet 1    rosuvastatin (CRESTOR) 20 MG tablet TAKE 1 TABLET BY MOUTH EVERY DAY 90 tablet 3    metFORMIN (GLUCOPHAGE) 1000 MG tablet TAKE 1 TABLET BY MOUTH EVERY DAY 90 tablet 3    meclizine (ANTIVERT) 25 MG tablet TAKE

## 2025-03-24 ENCOUNTER — OFFICE VISIT (OUTPATIENT)
Age: 65
End: 2025-03-24
Payer: COMMERCIAL

## 2025-03-24 VITALS
HEIGHT: 61 IN | DIASTOLIC BLOOD PRESSURE: 74 MMHG | BODY MASS INDEX: 30.58 KG/M2 | TEMPERATURE: 97.1 F | RESPIRATION RATE: 16 BRPM | SYSTOLIC BLOOD PRESSURE: 100 MMHG | HEART RATE: 67 BPM | WEIGHT: 162 LBS | OXYGEN SATURATION: 98 %

## 2025-03-24 DIAGNOSIS — I10 HYPERTENSION, UNSPECIFIED TYPE: ICD-10-CM

## 2025-03-24 DIAGNOSIS — E78.5 HYPERLIPIDEMIA, UNSPECIFIED HYPERLIPIDEMIA TYPE: ICD-10-CM

## 2025-03-24 DIAGNOSIS — R10.13 EPIGASTRIC PAIN: ICD-10-CM

## 2025-03-24 DIAGNOSIS — E11.43 DIABETIC GASTROPARESIS (HCC): ICD-10-CM

## 2025-03-24 DIAGNOSIS — E11.9 TYPE 2 DIABETES MELLITUS WITHOUT COMPLICATION, WITHOUT LONG-TERM CURRENT USE OF INSULIN: Primary | ICD-10-CM

## 2025-03-24 DIAGNOSIS — E11.9 TYPE 2 DIABETES MELLITUS WITHOUT COMPLICATION, WITHOUT LONG-TERM CURRENT USE OF INSULIN: ICD-10-CM

## 2025-03-24 DIAGNOSIS — M17.12 OSTEOARTHRITIS OF LEFT KNEE, UNSPECIFIED OSTEOARTHRITIS TYPE: ICD-10-CM

## 2025-03-24 DIAGNOSIS — K31.84 DIABETIC GASTROPARESIS (HCC): ICD-10-CM

## 2025-03-24 DIAGNOSIS — Z00.00 ROUTINE PHYSICAL EXAMINATION: Primary | ICD-10-CM

## 2025-03-24 DIAGNOSIS — R35.1 NOCTURIA: ICD-10-CM

## 2025-03-24 PROCEDURE — 3074F SYST BP LT 130 MM HG: CPT | Performed by: INTERNAL MEDICINE

## 2025-03-24 PROCEDURE — 3078F DIAST BP <80 MM HG: CPT | Performed by: INTERNAL MEDICINE

## 2025-03-24 PROCEDURE — 99396 PREV VISIT EST AGE 40-64: CPT | Performed by: INTERNAL MEDICINE

## 2025-03-24 RX ORDER — IBUPROFEN 600 MG/1
600 TABLET, FILM COATED ORAL 3 TIMES DAILY PRN
Qty: 30 TABLET | Refills: 0 | Status: SHIPPED | OUTPATIENT
Start: 2025-03-24

## 2025-03-24 RX ORDER — BLOOD-GLUCOSE METER
1 KIT MISCELLANEOUS DAILY
Qty: 1 KIT | Refills: 0 | Status: SHIPPED | OUTPATIENT
Start: 2025-03-24

## 2025-03-24 RX ORDER — AVOBENZONE, HOMOSALATE, OCTISALATE, OCTOCRYLENE 30; 40; 45; 26 MG/ML; MG/ML; MG/ML; MG/ML
1 CREAM TOPICAL DAILY
Qty: 100 EACH | Refills: 5 | Status: SHIPPED | OUTPATIENT
Start: 2025-03-24

## 2025-03-24 RX ORDER — AVOBENZONE, HOMOSALATE, OCTISALATE, OCTOCRYLENE 30; 40; 45; 26 MG/ML; MG/ML; MG/ML; MG/ML
1 CREAM TOPICAL DAILY
COMMUNITY
End: 2025-03-24 | Stop reason: SDUPTHER

## 2025-03-24 RX ORDER — GLUCOSAMINE HCL/CHONDROITIN SU 500-400 MG
CAPSULE ORAL
Qty: 50 STRIP | Refills: 5 | Status: SHIPPED | OUTPATIENT
Start: 2025-03-24

## 2025-03-24 SDOH — ECONOMIC STABILITY: FOOD INSECURITY: WITHIN THE PAST 12 MONTHS, THE FOOD YOU BOUGHT JUST DIDN'T LAST AND YOU DIDN'T HAVE MONEY TO GET MORE.: NEVER TRUE

## 2025-03-24 SDOH — ECONOMIC STABILITY: FOOD INSECURITY: WITHIN THE PAST 12 MONTHS, YOU WORRIED THAT YOUR FOOD WOULD RUN OUT BEFORE YOU GOT MONEY TO BUY MORE.: NEVER TRUE

## 2025-03-24 ASSESSMENT — PATIENT HEALTH QUESTIONNAIRE - PHQ9
SUM OF ALL RESPONSES TO PHQ QUESTIONS 1-9: 0
1. LITTLE INTEREST OR PLEASURE IN DOING THINGS: NOT AT ALL
2. FEELING DOWN, DEPRESSED OR HOPELESS: NOT AT ALL

## 2025-03-24 NOTE — PROGRESS NOTES
\"Have you been to the ER, urgent care clinic since your last visit?  Hospitalized since your last visit?\"    1/2025 covid    “Have you seen or consulted any other health care providers outside our system since your last visit?”    no     “Have you had a pap smear?”    no    No cervical cancer screening on file       “Have you had a diabetic eye exam?”    no    No diabetic eye exam on file

## 2025-03-25 ENCOUNTER — RESULTS FOLLOW-UP (OUTPATIENT)
Age: 65
End: 2025-03-25

## 2025-03-25 LAB
ALBUMIN SERPL-MCNC: 4 G/DL (ref 3.5–5)
ALBUMIN/GLOB SERPL: 1.3 (ref 1.1–2.2)
ALP SERPL-CCNC: 90 U/L (ref 45–117)
ALT SERPL-CCNC: 25 U/L (ref 12–78)
ANION GAP SERPL CALC-SCNC: 7 MMOL/L (ref 2–12)
APPEARANCE UR: CLEAR
AST SERPL-CCNC: 18 U/L (ref 15–37)
BACTERIA URNS QL MICRO: NEGATIVE /HPF
BILIRUB SERPL-MCNC: 0.4 MG/DL (ref 0.2–1)
BILIRUB UR QL: NEGATIVE
BUN SERPL-MCNC: 17 MG/DL (ref 6–20)
BUN/CREAT SERPL: 23 (ref 12–20)
CALCIUM SERPL-MCNC: 9.3 MG/DL (ref 8.5–10.1)
CHLORIDE SERPL-SCNC: 107 MMOL/L (ref 97–108)
CHOLEST SERPL-MCNC: 153 MG/DL
CO2 SERPL-SCNC: 28 MMOL/L (ref 21–32)
COLOR UR: NORMAL
CREAT SERPL-MCNC: 0.74 MG/DL (ref 0.55–1.02)
EPITH CASTS URNS QL MICRO: NORMAL /LPF
ERYTHROCYTE [DISTWIDTH] IN BLOOD BY AUTOMATED COUNT: 14.3 % (ref 11.5–14.5)
EST. AVERAGE GLUCOSE BLD GHB EST-MCNC: 148 MG/DL
GLOBULIN SER CALC-MCNC: 3.2 G/DL (ref 2–4)
GLUCOSE SERPL-MCNC: 110 MG/DL (ref 65–100)
GLUCOSE UR STRIP.AUTO-MCNC: NEGATIVE MG/DL
HBA1C MFR BLD: 6.8 % (ref 4–5.6)
HCT VFR BLD AUTO: 39.9 % (ref 35–47)
HDLC SERPL-MCNC: 57 MG/DL
HDLC SERPL: 2.7 (ref 0–5)
HGB BLD-MCNC: 12.9 G/DL (ref 11.5–16)
HGB UR QL STRIP: NEGATIVE
HYALINE CASTS URNS QL MICRO: NORMAL /LPF (ref 0–5)
KETONES UR QL STRIP.AUTO: NEGATIVE MG/DL
LDLC SERPL CALC-MCNC: 84.6 MG/DL (ref 0–100)
LEUKOCYTE ESTERASE UR QL STRIP.AUTO: NEGATIVE
LIPASE SERPL-CCNC: 33 U/L (ref 13–75)
MCH RBC QN AUTO: 29.1 PG (ref 26–34)
MCHC RBC AUTO-ENTMCNC: 32.3 G/DL (ref 30–36.5)
MCV RBC AUTO: 89.9 FL (ref 80–99)
NITRITE UR QL STRIP.AUTO: NEGATIVE
NRBC # BLD: 0 K/UL (ref 0–0.01)
NRBC BLD-RTO: 0 PER 100 WBC
PH UR STRIP: 7 (ref 5–8)
PLATELET # BLD AUTO: 248 K/UL (ref 150–400)
PMV BLD AUTO: 11.9 FL (ref 8.9–12.9)
POTASSIUM SERPL-SCNC: 4 MMOL/L (ref 3.5–5.1)
PROT SERPL-MCNC: 7.2 G/DL (ref 6.4–8.2)
PROT UR STRIP-MCNC: NEGATIVE MG/DL
RBC # BLD AUTO: 4.44 M/UL (ref 3.8–5.2)
RBC #/AREA URNS HPF: NORMAL /HPF (ref 0–5)
SODIUM SERPL-SCNC: 142 MMOL/L (ref 136–145)
SP GR UR REFRACTOMETRY: 1.01 (ref 1–1.03)
TRIGL SERPL-MCNC: 57 MG/DL
TSH SERPL DL<=0.05 MIU/L-ACNC: 1.71 UIU/ML (ref 0.36–3.74)
URINE CULTURE IF INDICATED: NORMAL
UROBILINOGEN UR QL STRIP.AUTO: 0.2 EU/DL (ref 0.2–1)
VLDLC SERPL CALC-MCNC: 11.4 MG/DL
WBC # BLD AUTO: 10.5 K/UL (ref 3.6–11)
WBC URNS QL MICRO: NORMAL /HPF (ref 0–4)

## 2025-04-21 RX ORDER — LOSARTAN POTASSIUM 100 MG/1
100 TABLET ORAL DAILY
Qty: 90 TABLET | Refills: 2 | Status: SHIPPED | OUTPATIENT
Start: 2025-04-21

## 2025-04-24 ENCOUNTER — OFFICE VISIT (OUTPATIENT)
Age: 65
End: 2025-04-24
Payer: COMMERCIAL

## 2025-04-24 VITALS
HEART RATE: 70 BPM | HEIGHT: 64 IN | OXYGEN SATURATION: 98 % | SYSTOLIC BLOOD PRESSURE: 109 MMHG | DIASTOLIC BLOOD PRESSURE: 73 MMHG | BODY MASS INDEX: 28 KG/M2 | RESPIRATION RATE: 14 BRPM | TEMPERATURE: 98 F | WEIGHT: 164 LBS

## 2025-04-24 DIAGNOSIS — Z12.31 ENCOUNTER FOR SCREENING MAMMOGRAM FOR MALIGNANT NEOPLASM OF BREAST: ICD-10-CM

## 2025-04-24 DIAGNOSIS — R35.0 URINARY FREQUENCY: ICD-10-CM

## 2025-04-24 DIAGNOSIS — Z78.0 ENCOUNTER FOR OSTEOPOROSIS SCREENING IN ASYMPTOMATIC POSTMENOPAUSAL PATIENT: ICD-10-CM

## 2025-04-24 DIAGNOSIS — R10.2 PELVIC PAIN: ICD-10-CM

## 2025-04-24 DIAGNOSIS — Z13.820 ENCOUNTER FOR OSTEOPOROSIS SCREENING IN ASYMPTOMATIC POSTMENOPAUSAL PATIENT: ICD-10-CM

## 2025-04-24 DIAGNOSIS — Z01.419 ENCOUNTER FOR ANNUAL ROUTINE GYNECOLOGICAL EXAMINATION: Primary | ICD-10-CM

## 2025-04-24 PROCEDURE — 3078F DIAST BP <80 MM HG: CPT | Performed by: OBSTETRICS & GYNECOLOGY

## 2025-04-24 PROCEDURE — 3074F SYST BP LT 130 MM HG: CPT | Performed by: OBSTETRICS & GYNECOLOGY

## 2025-04-24 PROCEDURE — 99386 PREV VISIT NEW AGE 40-64: CPT | Performed by: OBSTETRICS & GYNECOLOGY

## 2025-04-24 NOTE — PROGRESS NOTES
Postmenopausal Annual Exam   Chief Complaint:   Chief Complaint   Patient presents with    Annual Exam    New Patient        HPI:  Candie Miller is a 64 y.o. , postmenopausal female here for her annual check up.     She is having  urinary issues and pelvic pain .     Urinary issues: Pt reports urinary frequency, nocturia and leaking of urine. She notes the symptoms for several months. She is not sure if she is taking her HCTZ at night or in the morning. She states her blood sugars are controlled.   Pelvic pain: Pt notes intermittent pain that starts in the suprapubic region and spreads to her back. She does admit to issues with gas. Normal bowel habits.     Since her last annual GYN exam, she has reports the following changes to her medical history: none    Menopause:    Patient states she is not having vasomotor symptoms. Her symptoms include no issues.      Sexual History:     She  reports that she is not currently sexually active and has had partner(s) who are male. She reports using the following methods of birth control/protection: Abstinence and Post-menopausal..    Patient requests STD screening: no.     Cervical, Breast, and Colon Cancer Screening:    Her most recent Pap smear was obtained year(s) ago and was normal. She denies having a history of abnormal pap smears in the past.     Patient is up to date on her mammogram for breast cancer screening.     Patient is up to date on colon cancer screening.     Osteoporosis Screening:     Osteoporosis screening with DEXA is required.     Past Medical History:    Past Medical History:   Diagnosis Date    Chronic pain     Diverticulosis 2009    colonoscopy . Dr. Cee    GERD (gastroesophageal reflux disease)     HTN (hypertension) 2009    Hypercholesterolemia 2009    Osteoarthritis 2009    Osteoporosis     Type 2 diabetes mellitus (HCC) 2009    uterine fibroids      Past Surgical History:   Procedure Laterality Date

## 2025-04-24 NOTE — PROGRESS NOTES
Candie Miller is a 64 y.o. female returns for an annual exam & pap    Patient relays that she has hx of fibroids. Concerns with pain on her pelvic area that comes and goes. Feels like the fibroids are on her bladder as she has had frequent urination for the past 6 months    Chief Complaint   Patient presents with    Annual Exam    New Patient      - son    No LMP recorded. (Menstrual status: Menopause). Went through this in her 50's  Last Pap: last done at [facility] more than 3 years ago   HRT: none  PMB: none  Last Mammogram: 2024 bi rads 1  Last Bone Density: has not had   Last colonoscopy: 11/10/2020 will go back in 2030      1. Have you been to the ER, urgent care clinic, or hospitalized since your last visit?  new    2. Have you seen or consulted any other health care providers outside of the [facility] since your last visit? new    Examination chaperoned by Gracie Farias LPN.

## 2025-04-26 LAB — BACTERIA UR CULT: NO GROWTH

## 2025-04-28 ENCOUNTER — RESULTS FOLLOW-UP (OUTPATIENT)
Age: 65
End: 2025-04-28

## 2025-05-02 LAB
CYTOLOGIST CVX/VAG CYTO: ABNORMAL
CYTOLOGY CVX/VAG DOC CYTO: ABNORMAL
CYTOLOGY CVX/VAG DOC THIN PREP: ABNORMAL
DX ICD CODE: ABNORMAL
DX ICD CODE: ABNORMAL
HPV GENOTYPE REFLEX: ABNORMAL
HPV I/H RISK 4 DNA CVX QL PROBE+SIG AMP: NEGATIVE
OTHER STN SPEC: ABNORMAL
PATHOLOGIST CVX/VAG CYTO: ABNORMAL
SERVICE CMNT-IMP: ABNORMAL
STAT OF ADQ CVX/VAG CYTO-IMP: ABNORMAL

## 2025-06-10 ENCOUNTER — TELEPHONE (OUTPATIENT)
Age: 65
End: 2025-06-10

## 2025-06-11 ENCOUNTER — OFFICE VISIT (OUTPATIENT)
Age: 65
End: 2025-06-11
Payer: COMMERCIAL

## 2025-06-11 VITALS
WEIGHT: 167 LBS | HEART RATE: 68 BPM | DIASTOLIC BLOOD PRESSURE: 64 MMHG | HEIGHT: 61 IN | TEMPERATURE: 97.5 F | SYSTOLIC BLOOD PRESSURE: 96 MMHG | OXYGEN SATURATION: 97 % | RESPIRATION RATE: 16 BRPM | BODY MASS INDEX: 31.53 KG/M2

## 2025-06-11 DIAGNOSIS — D25.1 INTRAMURAL AND SUBSEROUS LEIOMYOMA OF UTERUS: ICD-10-CM

## 2025-06-11 DIAGNOSIS — D25.2 INTRAMURAL AND SUBSEROUS LEIOMYOMA OF UTERUS: ICD-10-CM

## 2025-06-11 DIAGNOSIS — R10.2 PELVIC PAIN: Primary | ICD-10-CM

## 2025-06-11 PROBLEM — E11.40 DIABETIC NEUROPATHY (HCC): Status: ACTIVE | Noted: 2025-06-11

## 2025-06-11 PROBLEM — E53.8 VITAMIN B12 DEFICIENCY (NON ANEMIC): Status: ACTIVE | Noted: 2025-06-11

## 2025-06-11 PROBLEM — M75.42 IMPINGEMENT SYNDROME OF LEFT SHOULDER: Status: ACTIVE | Noted: 2022-04-25

## 2025-06-11 PROCEDURE — 3078F DIAST BP <80 MM HG: CPT | Performed by: OBSTETRICS & GYNECOLOGY

## 2025-06-11 PROCEDURE — 3074F SYST BP LT 130 MM HG: CPT | Performed by: OBSTETRICS & GYNECOLOGY

## 2025-06-11 PROCEDURE — 99213 OFFICE O/P EST LOW 20 MIN: CPT | Performed by: OBSTETRICS & GYNECOLOGY

## 2025-06-11 NOTE — PROGRESS NOTES
Chief Complaint   Patient presents with    Follow-up       Candie Miller is a 65 y.o. female is being seen for a problem visit.  Following up for ultrasound due to pelvic pain       Ob/Gyn Hx:     vaginal   No LMP recorded. (Menstrual status: Menopause).    Health Maintenance:    Last Pap - 25 LGSIL with HR HPV negative.       1. Have you been to the ER, urgent care clinic, or hospitalized since your last visit? Yes     2. Have you seen or consulted any other health care providers outside of the [facility] since your last visit? No     Exam chaperoned by:      Mimi Anaya CMA

## 2025-06-11 NOTE — PROGRESS NOTES
Ultrasound/ Lab Follow up Visit    Chief Complaint:   Chief Complaint   Patient presents with    Follow-up       HPI:    Candie Miller is a 65 y.o.  female with LMP of No LMP recorded (lmp unknown). (Menstrual status: Menopause).  who presents for ultrasound follow up. Ultrasound was done for pelvic pain.     Pap was LGSIL with negative HR HPV    Most recent ultrasound showed:     Gynecology Report [facility]   / 2025 2:07:43 PM  Gynecology Report [facility] Page 2 [facility]  Patient / Exam Information Date of Exam: 2025  Patient ID [id number]  Name Candie Miller  ,Age 1960,65  Sex Female  Expected Ovul.  Day of Cycle  Day of stim.    Para  AB  Ectopic  LMP  Perf. Phys. Allen Capone Ref. Phys. Allen Capone Sonographer SATURNINO ANDRADE RDMS  Comment Indication PELVIC PAIN  2D Measurements Value m1 m2 m3 m4 m5 m6 Meth.  Uterus  Length 4.85 cm 4.85 avg.  Width 5.90 cm 5.90 avg.  Height 3.38 cm 3.38 avg.  Volume 50.642 cm³ 50.642  Endo.Thickness 4.70 mm 4.70 avg.  Left Ovary  Length 1.99 cm 1.99 avg.  Width 1.24 cm 1.24 avg.  Height 1.00 cm 1.00 avg.  Volume 1.292 cm³ 1.292  2D Measurements - Fibroids  Fibroids D1 D2 D3 Avg. D Vol  Fibroid 1 19.7 mm 16.6 mm 18.1 mm 18.1 mm 3.105 cm³  Fibroid 2 15.1 mm 12.5 mm 18.0 mm 15.2 mm 1.776 cm³  Fibroid 3 26.2 mm 26.3 mm 24.2 mm 25.6 mm 8.739 cm³  Fibroid 4 16.1 mm 16.8 mm 15.8 mm 16.2 mm 2.235 cm³  2025 2:07:43 PM  Name: Candie Miller Patient ID: [id number]  Comment  TV ULTRASOUND  THE UTERUS IS ANTEVERTED, NORMAL IN SIZE, AND HETEROGENEOUS IN ECHOGENICITY.  THERE APPEARS TO BE FIBROIDS.  FIBROID 1- POSTERIOR SUBMUCOSAL MEASURING 20 X 17 X 18MM  FIBROID 2- ANTERIOR LEFT INTRAMURAL MEASURING 15 X 13 X 18MM  FIBROID 3- CALCIFIED ANTERIOR RIGHT SUBSEROSAL MEASURING 26 X 26 X 24MM  FIBROID 4- POSTERIOR LEFT SUBSEROSAL MEASURING 16 X 117 X 16MM  THE ENDOMETRIUM MEASURES 4MM IN THICKNESS. NO MASSES OR

## 2025-06-18 DIAGNOSIS — M17.12 OSTEOARTHRITIS OF LEFT KNEE, UNSPECIFIED OSTEOARTHRITIS TYPE: ICD-10-CM

## 2025-06-19 RX ORDER — IBUPROFEN 600 MG/1
TABLET, FILM COATED ORAL
Qty: 30 TABLET | Refills: 0 | Status: SHIPPED | OUTPATIENT
Start: 2025-06-19

## 2025-07-16 ENCOUNTER — TELEMEDICINE (OUTPATIENT)
Age: 65
End: 2025-07-16
Payer: COMMERCIAL

## 2025-07-16 DIAGNOSIS — J06.9 UPPER RESPIRATORY TRACT INFECTION, UNSPECIFIED TYPE: Primary | ICD-10-CM

## 2025-07-16 PROCEDURE — 99214 OFFICE O/P EST MOD 30 MIN: CPT | Performed by: FAMILY MEDICINE

## 2025-07-16 RX ORDER — BENZONATATE 200 MG/1
200 CAPSULE ORAL 3 TIMES DAILY PRN
Qty: 30 CAPSULE | Refills: 0 | Status: SHIPPED | OUTPATIENT
Start: 2025-07-16 | End: 2025-07-26

## 2025-07-16 NOTE — PROGRESS NOTES
Have you been to the ER, urgent care clinic since your last visit?  Hospitalized since your last visit?   [facility]/ Cov[facility]    Have you seen or consulted any other health care providers outside our system since your last visit?   NO      “Have you had a diabetic eye exam?”    NO     No diabetic eye exam on file

## 2025-07-16 NOTE — PROGRESS NOTES
2025    TELEHEALTH EVALUATION -- Audio/Visual    HPI:    Candie Miller (:  1960) has requested an audio/video evaluation for the following concern(s):    Patient presents today for acute cold symptoms.    General PCP Dr.Jason Correa    DM2: Pt is compliant in taking Metformin 1000mg daily. Their last HbA1c was 6.8 on 2025.     HTN: Patient is compliant in taking Losartan 100mg daily and HCTZ 25mg daily.     Pt developed cold symptoms a few days ago. Admits to coughing and sneezing. Cough is unproductive. Denies body aches and appetite changes. She has not taken temperature at home. Pt has not been around anyone with symptoms that she knows of. 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 She explains yesterday on 07/15/2025 was the worst she has felt and almost went to urgent care. Yesterday she was sweating and nauseous. Today she is feeling a lot better. Her voice is hoarse. Pt has not taken a Covid test.    Review of Systems   HENT:  Positive for sneezing.    Respiratory:  Positive for cough.        Prior to Visit Medications    Medication Sig Taking? Authorizing Provider   benzonatate (TESSALON) 200 MG capsule Take 1 capsule by mouth 3 times daily as needed for Cough Yes Joanna Munoz MD   ibuprofen (ADVIL;MOTRIN) 600 MG tablet TAKE 1 TABLET BY MOUTH THREE TIMES A DAY AS NEEDED FOR PAIN Yes Jasson Correa MD   losartan (COZAAR) 100 MG tablet TAKE 1 TABLET BY MOUTH EVERY DAY Yes Jasson Correa MD   Lancets MISC 1 each by Does not apply route daily For use with Ultra One Touch 2 glucometer to monitor symptoms of irregular blood glucose at least once daily. Dx: E11.9 Yes Jasson Correa MD   butalbital-acetaminophen-caffeine (FIORICET, ESGIC) -40 MG per tablet One tab q6 hours prn headache Yes Jasson Correa MD   hydroCHLOROthiazide (HYDRODIURIL) 25 MG tablet TAKE 1 TABLET BY MOUTH EVERY DAY Yes Jasson Correa MD   furosemide (LASIX) 20 MG tablet TAKE 1

## 2025-07-28 RX ORDER — HYDROCHLOROTHIAZIDE 25 MG/1
25 TABLET ORAL DAILY
Qty: 90 TABLET | Refills: 3 | Status: SHIPPED | OUTPATIENT
Start: 2025-07-28

## 2025-08-25 ENCOUNTER — TRANSCRIBE ORDERS (OUTPATIENT)
Facility: HOSPITAL | Age: 65
End: 2025-08-25

## 2025-08-25 DIAGNOSIS — Z12.31 VISIT FOR SCREENING MAMMOGRAM: Primary | ICD-10-CM

## 2025-08-25 RX ORDER — ROSUVASTATIN CALCIUM 20 MG/1
20 TABLET, COATED ORAL DAILY
Qty: 90 TABLET | Refills: 3 | Status: SHIPPED | OUTPATIENT
Start: 2025-08-25

## (undated) DEVICE — BASIN EMSIS 16OZ GRAPHITE PLAS KID SHP MOLD GRAD FOR ORAL

## (undated) DEVICE — Device

## (undated) DEVICE — CATH IV AUTOGRD BC PNK 20GA 25 -- INSYTE

## (undated) DEVICE — Z DISCONTINUED PER MEDLINE LINE GAS SAMPLING O2/CO2 LNG AD 13 FT NSL W/ TBNG FILTERLINE

## (undated) DEVICE — SOLIDIFIER FLD 2OZ 1500CC N DISINF IN BTL DISP SAFESORB

## (undated) DEVICE — NEEDLE HYPO 18GA L1.5IN PNK S STL HUB POLYPR SHLD REG BVL

## (undated) DEVICE — ELECTRODE,RADIOTRANSLUCENT,FOAM,5PK: Brand: MEDLINE

## (undated) DEVICE — SET ADMIN 16ML TBNG L100IN 2 Y INJ SITE IV PIGGY BK DISP

## (undated) DEVICE — SYR 3ML LL TIP 1/10ML GRAD --

## (undated) DEVICE — TOWEL 4 PLY TISS 19X30 SUE WHT

## (undated) DEVICE — NEONATAL-ADULT SPO2 SENSOR: Brand: NELLCOR

## (undated) DEVICE — SYR 10ML LUER LOK 1/5ML GRAD --

## (undated) DEVICE — 1200 GUARD II KIT W/5MM TUBE W/O VAC TUBE: Brand: GUARDIAN